# Patient Record
Sex: FEMALE | Race: WHITE | NOT HISPANIC OR LATINO | Employment: PART TIME
[De-identification: names, ages, dates, MRNs, and addresses within clinical notes are randomized per-mention and may not be internally consistent; named-entity substitution may affect disease eponyms.]

---

## 2017-03-21 ENCOUNTER — HOSPITAL (OUTPATIENT)
Dept: OTHER | Age: 42
End: 2017-03-21
Attending: EMERGENCY MEDICINE

## 2017-03-21 LAB
ANALYZER ANC (IANC): ABNORMAL
ANION GAP SERPL CALC-SCNC: 17 MMOL/L (ref 10–20)
BASOPHILS # BLD: 0 THOUSAND/MCL (ref 0–0.3)
BASOPHILS NFR BLD: 0 %
BUN SERPL-MCNC: 25 MG/DL (ref 6–20)
BUN/CREAT SERPL: 25 (ref 7–25)
CALCIUM SERPL-MCNC: 8.3 MG/DL (ref 8.4–10.2)
CHLORIDE: 107 MMOL/L (ref 98–107)
CO2 SERPL-SCNC: 21 MMOL/L (ref 21–32)
CREAT SERPL-MCNC: 0.99 MG/DL (ref 0.51–0.95)
DIFFERENTIAL METHOD BLD: ABNORMAL
EOSINOPHIL # BLD: 0.2 THOUSAND/MCL (ref 0.1–0.5)
EOSINOPHIL NFR BLD: 2 %
ERYTHROCYTE [DISTWIDTH] IN BLOOD: 13.7 % (ref 11–15)
GLUCOSE SERPL-MCNC: 130 MG/DL (ref 65–99)
HEMATOCRIT: 42.2 % (ref 36–46.5)
HGB BLD-MCNC: 13.4 GM/DL (ref 12–15.5)
LYMPHOCYTES # BLD: 1.5 THOUSAND/MCL (ref 1–4.8)
LYMPHOCYTES NFR BLD: 24 %
MCH RBC QN AUTO: 28.7 PG (ref 26–34)
MCHC RBC AUTO-ENTMCNC: 31.8 GM/DL (ref 32–36.5)
MCV RBC AUTO: 90.4 FL (ref 78–100)
MONOCYTES # BLD: 0.4 THOUSAND/MCL (ref 0.3–0.9)
MONOCYTES NFR BLD: 6 %
NEUTROPHILS # BLD: 4.1 THOUSAND/MCL (ref 1.8–7.7)
NEUTROPHILS NFR BLD: 68 %
NEUTS SEG NFR BLD: ABNORMAL %
PERCENT NRBC: ABNORMAL
PLATELET # BLD: 223 THOUSAND/MCL (ref 140–450)
POTASSIUM SERPL-SCNC: 3.7 MMOL/L (ref 3.4–5.1)
RBC # BLD: 4.67 MILLION/MCL (ref 4–5.2)
SODIUM SERPL-SCNC: 141 MMOL/L (ref 135–145)
WBC # BLD: 6.2 THOUSAND/MCL (ref 4.2–11)

## 2017-04-17 ENCOUNTER — APPOINTMENT (OUTPATIENT)
Dept: CT IMAGING | Facility: HOSPITAL | Age: 42
DRG: 101 | End: 2017-04-17
Attending: EMERGENCY MEDICINE
Payer: COMMERCIAL

## 2017-04-17 ENCOUNTER — HOSPITAL ENCOUNTER (INPATIENT)
Facility: HOSPITAL | Age: 42
LOS: 2 days | Discharge: HOME | DRG: 101 | End: 2017-04-19
Attending: EMERGENCY MEDICINE | Admitting: HOSPITALIST
Payer: COMMERCIAL

## 2017-04-17 DIAGNOSIS — R51.9 INTRACTABLE HEADACHE, UNSPECIFIED CHRONICITY PATTERN, UNSPECIFIED HEADACHE TYPE: ICD-10-CM

## 2017-04-17 DIAGNOSIS — G40.909 SEIZURE DISORDER (HCC): Primary | ICD-10-CM

## 2017-04-17 PROCEDURE — 70450 CT HEAD/BRAIN W/O DYE: CPT

## 2017-04-17 RX ORDER — HEPARIN SODIUM 5000 [USP'U]/ML
5000 INJECTION, SOLUTION INTRAVENOUS; SUBCUTANEOUS EVERY 12 HOURS
Status: DISCONTINUED | OUTPATIENT
Start: 2017-04-17 | End: 2017-04-19

## 2017-04-17 RX ORDER — LORAZEPAM 2 MG/ML
INJECTION INTRAMUSCULAR
Status: DISPENSED
Start: 2017-04-17 | End: 2017-04-18

## 2017-04-17 RX ORDER — KETOROLAC TROMETHAMINE 30 MG/ML
30 INJECTION, SOLUTION INTRAMUSCULAR; INTRAVENOUS ONCE
Status: COMPLETED | OUTPATIENT
Start: 2017-04-17 | End: 2017-04-17

## 2017-04-17 RX ORDER — LORAZEPAM 2 MG/ML
2 INJECTION INTRAMUSCULAR AS NEEDED
COMMUNITY
End: 2018-09-26 | Stop reason: ALTCHOICE

## 2017-04-17 RX ORDER — DIPHENHYDRAMINE HYDROCHLORIDE 50 MG/ML
50 INJECTION INTRAMUSCULAR; INTRAVENOUS AS NEEDED
COMMUNITY
End: 2021-09-30 | Stop reason: ALTCHOICE

## 2017-04-17 RX ORDER — MORPHINE SULFATE 15 MG/1
15 TABLET ORAL 2 TIMES DAILY PRN
COMMUNITY
End: 2018-09-26 | Stop reason: ALTCHOICE

## 2017-04-17 RX ORDER — LACTULOSE 10 G/15ML
10 SOLUTION ORAL 2 TIMES DAILY
COMMUNITY
End: 2019-11-20 | Stop reason: ALTCHOICE

## 2017-04-17 RX ORDER — SODIUM CHLORIDE 9 MG/ML
INJECTION, SOLUTION INTRAVENOUS CONTINUOUS
Status: DISCONTINUED | OUTPATIENT
Start: 2017-04-17 | End: 2017-04-19

## 2017-04-17 RX ORDER — ONDANSETRON 2 MG/ML
4 INJECTION INTRAMUSCULAR; INTRAVENOUS EVERY 4 HOURS PRN
Status: DISCONTINUED | OUTPATIENT
Start: 2017-04-17 | End: 2017-04-19

## 2017-04-17 RX ORDER — DIPHENHYDRAMINE HYDROCHLORIDE 50 MG/ML
12.5 INJECTION INTRAMUSCULAR; INTRAVENOUS ONCE
Status: COMPLETED | OUTPATIENT
Start: 2017-04-17 | End: 2017-04-17

## 2017-04-17 RX ORDER — ZOLPIDEM TARTRATE 10 MG/1
10 TABLET ORAL NIGHTLY
COMMUNITY
End: 2019-04-23

## 2017-04-17 RX ORDER — LORAZEPAM 2 MG/ML
1 INJECTION INTRAMUSCULAR ONCE
Status: DISCONTINUED | OUTPATIENT
Start: 2017-04-17 | End: 2017-04-19

## 2017-04-17 RX ORDER — METOCLOPRAMIDE HYDROCHLORIDE 5 MG/ML
10 INJECTION INTRAMUSCULAR; INTRAVENOUS ONCE
Status: COMPLETED | OUTPATIENT
Start: 2017-04-17 | End: 2017-04-17

## 2017-04-17 RX ORDER — METOCLOPRAMIDE HYDROCHLORIDE 5 MG/ML
10 INJECTION INTRAMUSCULAR; INTRAVENOUS AS NEEDED
COMMUNITY

## 2017-04-17 NOTE — ED INITIAL ASSESSMENT (HPI)
PT from PCP office for \"regular check-up\", had 2 witnessed seizures. Pt states has not slept in 2 days which bring on seizures, 1 episode seizure last night. Pt states \"I took a concoction of medication last night to help me sleep. \"

## 2017-04-18 PROCEDURE — 99222 1ST HOSP IP/OBS MODERATE 55: CPT | Performed by: HOSPITALIST

## 2017-04-18 PROCEDURE — 99254 IP/OBS CNSLTJ NEW/EST MOD 60: CPT | Performed by: OTHER

## 2017-04-18 RX ORDER — MAGNESIUM SULFATE HEPTAHYDRATE 40 MG/ML
2 INJECTION, SOLUTION INTRAVENOUS ONCE
Status: COMPLETED | OUTPATIENT
Start: 2017-04-18 | End: 2017-04-18

## 2017-04-18 RX ORDER — LAMOTRIGINE 100 MG/1
200 TABLET ORAL EVERY MORNING
Status: DISCONTINUED | OUTPATIENT
Start: 2017-04-18 | End: 2017-04-19

## 2017-04-18 RX ORDER — METHYLPREDNISOLONE SODIUM SUCCINATE 40 MG/ML
1000 INJECTION, POWDER, LYOPHILIZED, FOR SOLUTION INTRAMUSCULAR; INTRAVENOUS ONCE
Status: DISCONTINUED | OUTPATIENT
Start: 2017-04-18 | End: 2017-04-18

## 2017-04-18 RX ORDER — FLUOXETINE HYDROCHLORIDE 20 MG/1
20 CAPSULE ORAL 2 TIMES DAILY
Status: DISCONTINUED | OUTPATIENT
Start: 2017-04-18 | End: 2017-04-19

## 2017-04-18 RX ORDER — DIPHENHYDRAMINE HYDROCHLORIDE 50 MG/ML
25 INJECTION INTRAMUSCULAR; INTRAVENOUS ONCE
Status: COMPLETED | OUTPATIENT
Start: 2017-04-18 | End: 2017-04-18

## 2017-04-18 RX ORDER — ZOLPIDEM TARTRATE 10 MG/1
10 TABLET ORAL NIGHTLY PRN
Status: DISCONTINUED | OUTPATIENT
Start: 2017-04-18 | End: 2017-04-19

## 2017-04-18 RX ORDER — POTASSIUM CHLORIDE 20 MEQ/1
40 TABLET, EXTENDED RELEASE ORAL ONCE
Status: COMPLETED | OUTPATIENT
Start: 2017-04-18 | End: 2017-04-18

## 2017-04-18 RX ORDER — MORPHINE SULFATE 15 MG/1
15 TABLET ORAL 2 TIMES DAILY PRN
Status: DISCONTINUED | OUTPATIENT
Start: 2017-04-18 | End: 2017-04-19

## 2017-04-18 RX ORDER — LACTULOSE 10 G/15ML
10 SOLUTION ORAL 2 TIMES DAILY
Status: DISCONTINUED | OUTPATIENT
Start: 2017-04-18 | End: 2017-04-19

## 2017-04-18 RX ORDER — MORPHINE SULFATE 2 MG/ML
2 INJECTION, SOLUTION INTRAMUSCULAR; INTRAVENOUS ONCE
Status: COMPLETED | OUTPATIENT
Start: 2017-04-18 | End: 2017-04-18

## 2017-04-18 RX ORDER — LAMOTRIGINE 100 MG/1
300 TABLET ORAL NIGHTLY
Status: DISCONTINUED | OUTPATIENT
Start: 2017-04-18 | End: 2017-04-19

## 2017-04-18 RX ORDER — TOPIRAMATE 100 MG/1
100 TABLET, FILM COATED ORAL EVERY MORNING
Status: DISCONTINUED | OUTPATIENT
Start: 2017-04-18 | End: 2017-04-19

## 2017-04-18 NOTE — PROGRESS NOTES
ADMISSION NOTE    37year old female with h/o seizure disorder s/p partial frontal lobectomy for seizure control has been unable to sleep for the past 2-3 days presents with 3 seizures today . Available medical records partially reviewed.  Dictation to foll

## 2017-04-18 NOTE — ED NOTES
Pt safe to transport to floor per MD. Report given to Aniya Mclain [facility]

## 2017-04-18 NOTE — H&P
[facility]    PATIENT'S NAME: Henry Terrazasrow   ATTENDING PHYSICIAN: Paty Bautista MD   PATIENT ACCOUNT#:   [de-identified]    LOCATION:  [address] [place] [address] RECORD #:   [id number]       YOB: 1975  ADMISSION MILY and Topamax. She did not hit her head. She denies any tongue biting during the seizure and states she has never had any tongue biting. Review of the medical record indicates that she has also had pseudoseizures in the past as well.   She tells me that th admission, fluoxetine 20 mg twice a day, lactulose 10 g twice a day, Lamictal 200 mg every morning and 300 mg nightly, lorazepam 2 mg by mouth nightly, Topamax 100 mg daily and 200 mg nightly, Ambien 10 mg nightly, metoclopramide 5 mg IM as needed for naus Clear with easy respiratory excursions. No wheezes, rhonchi, or rales. HEART:  Regular rate and rhythm. Normal S1, S2.  ABDOMEN:  Obese, soft, nontender with normoactive bowel sounds. No guarding. No rebound.   EXTREMITIES:  No clubbing, cyanosis, merlin will place on Bactroban intranasally twice a day for 5 days. 8.   DVT prophylaxis. Subcutaneous heparin. 9.   Depression. Continue fluoxetine. The patient's current clinical status and proposed treatment plan were discussed with her.   All of her q

## 2017-04-18 NOTE — CONSULTS
Neurology Inpatient Consult Note    Ernesto Valdez : 1975   Referring Physician: Dr. Beka Nuñez  HPI:     Ernesto Valdez is a 37year old female who is being seen in neurologic evaluation.     Patient is being seen in ev daily, lamotrigine 100 mg twice daily, and gabapentin 300 mg daily.   She does not appear to have followed up with neurology since then as had been instructed, but has had a number of emergency room evaluations at various facilities for seizures and headach bilaterally  Neuro:  Mental Status: alert, speech fluent and appropriate       Cranial Nerves: pupils equal, round, and reactive to light; extraocular movements intact; facial sensation intact V1-3, face symmetric, hearing intact, no dysarthria or dysphoni

## 2017-04-18 NOTE — PLAN OF CARE
Problem: Patient/Family Goals  Goal: Patient/Family Long Term Goal  Patient’s Long Term Goal: to return home without seizure activity  Interventions:  - take all prescribed medications  -follow MD recommendations  - See additional Care Plan goals for speci Monitor swallowing and airway patency with patient fatigue and changes in neurological status  - Encourage and assist patient to increase activity and self care with guidance from PT/OT  - Encourage visually impaired, hearing impaired and aphasic patients patient/family to participate in care and decision-making at the level they choose  - Honor patient and family perspectives and choices  Outcome: Progressing  Patient updated on POC and oriented to unit.   Discussed time night medications and medications pa

## 2017-04-18 NOTE — PROGRESS NOTES
[facility]    Progress Note    Rodriguez Villa Patient Status:  Inpatient    1975 MRN [id number]   Location [facility] 5SW/SE Attending James Amor MD   Hosp Day # 1 PCP VALENTIN WOLF III       SUBJECTIVE:  Still lamoTRIgine (LAMICTAL) tab 200 mg 200 mg Oral QAM   lamoTRIgine (LAMICTAL) tab 300 mg 300 mg Oral Nightly   morphINE Sulfate IR (MSIR) tab 15 mg 15 mg Oral BID PRN   topiramate (Topamax) tab 100 mg 100 mg Oral QAM   topiramate (TOPAMAX) tab 200 mg Oral Q

## 2017-04-18 NOTE — PROGRESS NOTES
[facility] Pharmacy Note: Antimicrobial Weight Dose Adjustment for: Rocephin (ceftriaxone)    Liberty Gosselin is a 37year old female who has been prescribed Rocephin (ceftriaxone)1 gm ivpb q24h.   CrCl is estimated creatinine clearance is 75 mL/min (bas

## 2017-04-18 NOTE — PROGRESS NOTES
[facility]    Progress Note    Yong Dawn Patient Status:  Inpatient    1975 MRN [id number]   Location [facility] 5SW/SE Attending Pradip Snyder MD   Hosp Day # 1 PCP VALENTIN WOLF III     Subjective:     Const pending, await neuro consult    Results:     Lab Results  Component Value Date   WBC 5.4 04/18/2017   HGB 12.5 04/18/2017   HCT 37.7 04/18/2017    04/18/2017   CREATSERUM 1.09 04/18/2017   BUN 25* 04/18/2017    04/18/2017   K 3.8 04/18/2017

## 2017-04-18 NOTE — ED PROVIDER NOTES
Patient Seen in: [facility] 5sw/se    History   Patient presents with:  Seizure Disorder (neurologic)    Stated Complaint: seizure    HPI    42-year-old female with history of pseudoseizure and epilepsy presents for evaluation of seizure.   Patient Sulfate IR 15 MG Oral Tab,  Take 15 mg by mouth 2 (two) times daily as needed for Pain. LORazepam (ATIVAN) 2 MG Oral Tab,  Take 2 mg by mouth nightly. lamoTRIgine (LAMICTAL) 100 MG Oral Tab,  Take 200 mg by mouth every morning.    lamoTRIgine (LAMICTA Abdominal: Soft. Bowel sounds are normal. She exhibits no distension. There is no tenderness. There is no rebound and no guarding. Musculoskeletal: Normal range of motion. Neurological: She is alert and oriented to person, place, and time.  No cranial positive result does not necessarily indicate the presence of viable organisms. For confirmation, epidemiological typing and susceptibility testing, appropriate culture is needed. PLEASE REFER TO MRSA SCREENING PROTOCOL FOR TREATMENT.    PROLACTIN - Norm was no further recommendations for the emergency department, does advise holding off on loading with any antiepileptics given history of possible pseudoseizures. Patient updated regarding plan for admission.       Disposition and Plan     Clinical Impressi

## 2017-04-19 VITALS
OXYGEN SATURATION: 97 % | RESPIRATION RATE: 18 BRPM | HEART RATE: 90 BPM | BODY MASS INDEX: 34.69 KG/M2 | TEMPERATURE: 98 F | WEIGHT: 221 LBS | DIASTOLIC BLOOD PRESSURE: 66 MMHG | SYSTOLIC BLOOD PRESSURE: 115 MMHG | HEIGHT: 67 IN

## 2017-04-19 PROCEDURE — 99239 HOSP IP/OBS DSCHRG MGMT >30: CPT | Performed by: HOSPITALIST

## 2017-04-19 PROCEDURE — 95816 EEG AWAKE AND DROWSY: CPT | Performed by: OTHER

## 2017-04-19 PROCEDURE — 99232 SBSQ HOSP IP/OBS MODERATE 35: CPT | Performed by: OTHER

## 2017-04-19 RX ORDER — MORPHINE SULFATE 2 MG/ML
INJECTION, SOLUTION INTRAMUSCULAR; INTRAVENOUS
Status: COMPLETED
Start: 2017-04-19 | End: 2017-04-19

## 2017-04-19 RX ORDER — KETOROLAC TROMETHAMINE 30 MG/ML
INJECTION, SOLUTION INTRAMUSCULAR; INTRAVENOUS
Status: COMPLETED
Start: 2017-04-19 | End: 2017-04-19

## 2017-04-19 RX ORDER — KETOROLAC TROMETHAMINE 30 MG/ML
30 INJECTION, SOLUTION INTRAMUSCULAR; INTRAVENOUS 2 TIMES DAILY PRN
Status: DISCONTINUED | OUTPATIENT
Start: 2017-04-19 | End: 2017-04-19

## 2017-04-19 RX ORDER — MORPHINE SULFATE 2 MG/ML
2 INJECTION, SOLUTION INTRAMUSCULAR; INTRAVENOUS ONCE
Status: COMPLETED | OUTPATIENT
Start: 2017-04-19 | End: 2017-04-19

## 2017-04-19 NOTE — PLAN OF CARE
Problem: Patient/Family Goals  Goal: Patient/Family Long Term Goal  Patient’s Long Term Goal: to return home without seizure activity  Interventions:  - take all prescribed medications  -follow MD recommendations  - See additional Care Plan goals for speci RN of any seizure activity  - Instruct patient/family to call for assistance with activity based on assessment   Outcome: Adequate for Discharge  Rails padded. Free from seizure activity.   Goal: Achieves maximal functionality and self care  INTERVENTIONS \"migraine cocktail\" including toradol IM, benadryl IM, Reglan taken together, then morphine PO as well as prn ativan.  (Dr Lillian Porras verified with pt's neurologist.)  - Provide timely, complete, and accurate information to patient/family  - Incorporate amanda

## 2017-04-19 NOTE — DISCHARGE PLANNING
SW met w/ pt to discuss discharge planning. Pt reported that she lives at home and is independent w/ ADL's and drives. Pt reported that she is able to afford Rx and is compliant w/ Rx. Pt stated she follows up w/ her PCP when necessary.  Pt does not anticip

## 2017-04-19 NOTE — PLAN OF CARE
Problem: Patient/Family Goals  Goal: Patient/Family Long Term Goal  Patient’s Long Term Goal: to return home without seizure activity  Interventions:  - take all prescribed medications  -follow MD recommendations  - See additional Care Plan goals for speci maximal functionality and self care  INTERVENTIONS  - Monitor swallowing and airway patency with patient fatigue and changes in neurological status  - Encourage and assist patient to increase activity and self care with guidance from PT/OT  - Encourage vis decision-making at the level they choose  - Honor patient and family perspectives and choices   Outcome: Progressing  Patient updated on POC and night time medication.   Benadryl and morphine ordered one time for severe head pain

## 2017-04-19 NOTE — PROCEDURES
[facility], [address] [address]      PATIENT'S NAME: Khadar Zarate   ATTENDING PHYSICIAN: Samson Cloud MD   PATIENT ACCOUNT #: [de-identified] LOCATION: [address] [place] [address] RECORD #: [id number]

## 2017-04-19 NOTE — PROGRESS NOTES
Patient was seen and examined. C/o migraine still but better. Wants to go home today. Prescriptions were verified with Aline. Pt has recently filled all medications. Discussed discharge plans with patient at bedside.    Discussed discharge meds, fol

## 2017-04-19 NOTE — PROGRESS NOTES
Neurology Inpatient Follow-up Note      HPI:     Patient continues to complain of headache. I have spoken with her outpatient neurologist; see summary below.       Past Medical Hisotory:  Reviewed    Medications:  Reviewed    Allergies:    Avocado Benadryl, Toradol, and lorazepam when she gets acute migraine; she is currently undergoing evaluation for occipital nerve stimulator for her headaches, and is also in the process of establishing care with an epileptologist    –Patient should have close fol

## 2017-04-19 NOTE — DISCHARGE SUMMARY
[facility] - [facility]    Discharge Summary    Ernesto Valdez Patient Status:  Inpatient    1975 MRN [id number]   Location [facility] 5SW/SE Attending Larisa Condon MD   Hosp Day # 2 DIMA Mancera III     Date of Admission Valium, which she gives herself at home with IM injections.  She is also followed in pain clinic as well.  She additionally reports that insomnia is a major problem for her, but over the past 2 or 3 days, she has not slept at all.  She denies any recent UR the left temporal lobe.  No acute intracranial process, prior left temporal craniectomy, and prior left frontal and frontoparietal craniotomies with prior right frontal craniotomy as well.  The patient was transferred to remote telemetry following the [address] Take 200 mg by mouth every morning. Refills:  0       lamoTRIgine 100 MG Tabs   Last time this was given:  200 mg on 4/19/2017  8:57 AM   Commonly known as:  LAMICTAL        Take 300 mg by mouth nightly.     Refills:  0       LORazepam 2 MG/ML Soln

## 2017-04-20 NOTE — PAYOR COMM NOTE
Attending Physician: No att. providers found    Review Type: ADMISSION   Reviewer:  Lo Guzman       Date: April 20, 2017 - 3:09 PM  Payor: Kevin MORRISON POS/EMILIANO  Authorization Number: [id number]  Admit date: 4/17/2017  5:42 PM   Admitted from Emergency D complete jaw reconstruction     TONSILLECTOMY         Medications :   LORazepam 2 MG/ML Injection Solution,  Inject 2 mg into the muscle as needed. DiphenhydrAMINE HCl 50 MG/ML Injection Solution,  Inject 50 mg into the muscle as needed.    lactulose 10 G kg/m2  SpO2 97%  LMP 03/27/2017        Physical Exam   Constitutional: She is oriented to person, place, and time. She appears well-developed and well-nourished. No distress. HENT:   Head: Normocephalic and atraumatic.    Mouth/Throat: Oropharynx is clear Blood Urine Moderate (*)     Leukocyte Esterase Urine Moderate (*)     WBC Urine 22 (*)     RBC URINE 63 (*)     Bacteria Urine Few (*)     All other components within normal limits   DRUG ABUSE PANEL 10 SCREEN - Abnormal; Notable for the following:     Ur PCR-CC[[id number]]     Abnormal            Final result                 Please view results for these tests on the individual orders.    URINE CULTURE, ROUTINE   CBC W/ DIFFERENTIAL       MDM   Differential diagnosis includes seizure disorder, subtherapeutic Seizures. HISTORY OF PRESENT ILLNESS:  This is a very pleasant, 43-year-old woman with a past medical history of seizure disorder. She said that the seizures began following head trauma as a child.   She has had, she reports, a partial frontal lobectomy performed, which was positive for benzodiazepines, which the patient takes at home; opiates, she is on oral morphine at home. It was also positive for marijuana. Her urinalysis revealed 22 white cells, 63 red cells, few bacteria.   Her white count was 5.5 migraine headaches, morphine IR 15 mg twice a day p.r.n., diphenhydramine 50 mg intramuscularly as needed. ALLERGIES:  Avocado causes \"my face to blow up. \"    FAMILY HISTORY:  Patient reports that her mother  at 79 of sepsis following a total knee any significant rashes. NEUROLOGIC:  The patient was awake, alert, oriented x3. There was no focal neurologic deficit. PSYCHIATRIC:  Her mood and affect were pleasant and cooperative. BACK:  There was no costovertebral angle tenderness noted.     Ran Mckee above.      Dictated By Krystal De Leon MD  d: 04/18/2017 05:28:42  t: 04/18/2017 06:41:31  Job [id number]  ELKE/         REVIEWER COMMENTS:     OTHER:

## 2018-09-26 ENCOUNTER — HOSPITAL ENCOUNTER (OUTPATIENT)
Age: 43
Discharge: HOME | End: 2018-09-26
Attending: EMERGENCY MEDICINE
Payer: COMMERCIAL

## 2018-09-26 VITALS
TEMPERATURE: 98 F | OXYGEN SATURATION: 98 % | HEIGHT: 67 IN | DIASTOLIC BLOOD PRESSURE: 88 MMHG | HEART RATE: 89 BPM | WEIGHT: 202 LBS | SYSTOLIC BLOOD PRESSURE: 118 MMHG | BODY MASS INDEX: 31.71 KG/M2 | RESPIRATION RATE: 18 BRPM

## 2018-09-26 DIAGNOSIS — H60.501 ACUTE OTITIS EXTERNA OF RIGHT EAR, UNSPECIFIED TYPE: Primary | ICD-10-CM

## 2018-09-26 DIAGNOSIS — J06.9 VIRAL URI: ICD-10-CM

## 2018-09-26 PROCEDURE — 99214 OFFICE O/P EST MOD 30 MIN: CPT

## 2018-09-26 PROCEDURE — 87081 CULTURE SCREEN ONLY: CPT | Performed by: EMERGENCY MEDICINE

## 2018-09-26 PROCEDURE — 87430 STREP A AG IA: CPT | Performed by: EMERGENCY MEDICINE

## 2018-09-26 PROCEDURE — 99204 OFFICE O/P NEW MOD 45 MIN: CPT

## 2018-09-26 RX ORDER — CLONAZEPAM 0.5 MG/1
0.5 TABLET ORAL 2 TIMES DAILY PRN
COMMUNITY
End: 2019-04-23

## 2018-09-26 RX ORDER — CIPROFLOXACIN AND DEXAMETHASONE 3; 1 MG/ML; MG/ML
4 SUSPENSION/ DROPS AURICULAR (OTIC) 2 TIMES DAILY
Qty: 7.5 ML | Refills: 0 | Status: SHIPPED | OUTPATIENT
Start: 2018-09-26 | End: 2018-10-03

## 2018-09-26 RX ORDER — BACLOFEN 10 MG/1
10 TABLET ORAL 3 TIMES DAILY
COMMUNITY
End: 2019-04-23

## 2018-09-26 RX ORDER — OXYCODONE HYDROCHLORIDE 15 MG/1
15 TABLET ORAL EVERY 4 HOURS PRN
COMMUNITY
End: 2019-04-23

## 2018-09-26 RX ORDER — MAGNESIUM OXIDE 400 MG (241.3 MG MAGNESIUM) TABLET
400 TABLET DAILY
COMMUNITY
End: 2019-04-23

## 2018-09-26 NOTE — ED PROVIDER NOTES
Patient presents with:  Cough/URI    HPI:     Liberty Gosselin is a 44year old female who presents with chief complaint of congestion, cough, sore throat, R ear pain. Started 5 days ago with sore throat, congestion and cough.   Symptoms have per normal, atraumatic, no cyanosis or edema  Pulses: 2+ and symmetric  Skin:  No rashes, lesions or abrasions. Diagnostics:     Labs Reviewed   POCT RAPID STREP - Normal   GRP A STREP CULT, THROAT       MDM:     Otitis externa on exam.  Will rx ciprodex.

## 2018-09-26 NOTE — ED INITIAL ASSESSMENT (HPI)
Starting Saturday, c/o sore throat, productive cough, headache, right ear pain and sinus congestion. Monday had diarrhea and vomiting. Went to  yesterday and was told to use Zyrtec-D and Flonase. Denies fevers.

## 2018-10-29 ENCOUNTER — APPOINTMENT (OUTPATIENT)
Dept: OBGYN CLINIC | Facility: CLINIC | Age: 43
End: 2018-10-29
Payer: COMMERCIAL

## 2018-10-29 ENCOUNTER — OFFICE VISIT (OUTPATIENT)
Dept: OBGYN CLINIC | Facility: CLINIC | Age: 43
End: 2018-10-29
Payer: COMMERCIAL

## 2018-10-29 VITALS
DIASTOLIC BLOOD PRESSURE: 80 MMHG | TEMPERATURE: 98 F | BODY MASS INDEX: 32.02 KG/M2 | WEIGHT: 204 LBS | SYSTOLIC BLOOD PRESSURE: 122 MMHG | HEIGHT: 67 IN | RESPIRATION RATE: 12 BRPM | HEART RATE: 80 BPM

## 2018-10-29 DIAGNOSIS — Z11.51 SCREENING FOR HUMAN PAPILLOMAVIRUS: ICD-10-CM

## 2018-10-29 DIAGNOSIS — N92.1 MENORRHAGIA WITH IRREGULAR CYCLE: ICD-10-CM

## 2018-10-29 DIAGNOSIS — N94.6 DYSMENORRHEA: ICD-10-CM

## 2018-10-29 DIAGNOSIS — R10.2 PELVIC PAIN: Primary | ICD-10-CM

## 2018-10-29 DIAGNOSIS — Z30.013 ENCOUNTER FOR INITIAL PRESCRIPTION OF INJECTABLE CONTRACEPTIVE: ICD-10-CM

## 2018-10-29 PROCEDURE — 87591 N.GONORRHOEAE DNA AMP PROB: CPT | Performed by: OBSTETRICS & GYNECOLOGY

## 2018-10-29 PROCEDURE — 87491 CHLMYD TRACH DNA AMP PROBE: CPT | Performed by: OBSTETRICS & GYNECOLOGY

## 2018-10-29 PROCEDURE — 88175 CYTOPATH C/V AUTO FLUID REDO: CPT | Performed by: OBSTETRICS & GYNECOLOGY

## 2018-10-29 PROCEDURE — 87624 HPV HI-RISK TYP POOLED RSLT: CPT | Performed by: OBSTETRICS & GYNECOLOGY

## 2018-10-29 PROCEDURE — 96372 THER/PROPH/DIAG INJ SC/IM: CPT | Performed by: OBSTETRICS & GYNECOLOGY

## 2018-10-29 PROCEDURE — 99203 OFFICE O/P NEW LOW 30 MIN: CPT | Performed by: OBSTETRICS & GYNECOLOGY

## 2018-10-29 RX ORDER — METRONIDAZOLE 500 MG/1
500 TABLET ORAL 2 TIMES DAILY
Qty: 14 TABLET | Refills: 0 | Status: SHIPPED | OUTPATIENT
Start: 2018-10-29 | End: 2018-11-05

## 2018-10-29 RX ORDER — MEDROXYPROGESTERONE ACETATE 150 MG/ML
150 INJECTION, SUSPENSION INTRAMUSCULAR ONCE
Status: COMPLETED | OUTPATIENT
Start: 2018-10-29 | End: 2018-10-29

## 2018-10-29 RX ADMIN — MEDROXYPROGESTERONE ACETATE 150 MG: 150 INJECTION, SUSPENSION INTRAMUSCULAR at 16:46:00

## 2018-10-29 NOTE — PROGRESS NOTES
GYN H&P     10/29/2018  1:02 PM    CC: Patient is here to establish care and to discuss severe pelvic pain    HPI: Patient is a 44year old  here . Reports  2-3 mos  cycles  . She has been experiencing severe pelvic pain for the past 1-2 months.   I MG Oral Tab Take 400 mg by mouth daily. Disp:  Rfl:    ClonazePAM 0.5 MG Oral Tab Take 0.5 mg by mouth 2 (two) times daily as needed for Anxiety. Disp:  Rfl:    baclofen 10 MG Oral Tab Take 10 mg by mouth 3 (three) times daily.  Disp:  Rfl:    Manoj Norman Relation Age of Onset   • Other (Other) Father         HIV   • Other (Kidney) Father         Failing Kidneys   • Other (Other) Mother         MRSA     Social History    Socioeconomic History      Marital status: Legally       Spouse name: Not on f nontender, no palpable masses, no nipple discharge, no skin changes, no axillary adenopathy  ABDOMEN: Soft, non distended; non tender, no masses  GYNE/:   External Genitalia: normal, no lesions, good perineal support  Urethra: meatus normal   Bladder: we

## 2018-10-29 NOTE — PROGRESS NOTES
Pt c/o pelvic pain 2x weekly. 10/10 on pain scale which sets off her seizures. Had used depo which helped. Has a short term memory problem due to partial labectomy.

## 2018-11-05 ENCOUNTER — TELEPHONE (OUTPATIENT)
Dept: OBGYN CLINIC | Facility: CLINIC | Age: 43
End: 2018-11-05

## 2018-11-05 DIAGNOSIS — N83.209 CYST OF OVARY, UNSPECIFIED LATERALITY: Primary | ICD-10-CM

## 2018-11-05 RX ORDER — CLINDAMYCIN PHOSPHATE 20 MG/G
1 CREAM VAGINAL NIGHTLY
Qty: 40 G | Refills: 0 | Status: SHIPPED | OUTPATIENT
Start: 2018-11-05 | End: 2018-11-12

## 2018-11-05 NOTE — TELEPHONE ENCOUNTER
----- Message from Andres Hammer MD sent at 11/1/2018  9:59 PM CDT -----  Call pt. If pelvic pain is persisting f/u appt.  US  Left ovary simple cyst 2.8 cm should resolve but can repeat US in 8 weeks for reassurance

## 2018-11-05 NOTE — TELEPHONE ENCOUNTER
PC with patient. Aware of pap and ultrasound results. She states the antibiotic she was placed on-flagyl- caused her to have convulsions. She has a history of epilepsy. She and the pharmacist went through her medications.   It was determined a side effect

## 2018-11-05 NOTE — TELEPHONE ENCOUNTER
PC with patient. Aware can use Cleocin vaginal cream.States she would like to try it. Pended to doctor.

## 2018-11-13 ENCOUNTER — TELEPHONE (OUTPATIENT)
Dept: OBGYN CLINIC | Facility: CLINIC | Age: 43
End: 2018-11-13

## 2018-11-13 NOTE — TELEPHONE ENCOUNTER
Spoke to pt. She is changing her tampon every 2-3 hours since she woke up this morning. Denies pain in her abd/uterus or cramping. Did wake up with a migraine. Pt is taking her migraine medication which is helping. Pt just wanting to know what to do.   Told

## 2018-11-13 NOTE — TELEPHONE ENCOUNTER
Patient came in on 10/29 and received a shot (Depo) She called today and stated she started bleeding heavily yesterday. She was wondering if this could be from the Depo, also said it is triggering her migraines. Would like to know what she should do.

## 2018-11-25 ENCOUNTER — HOSPITAL ENCOUNTER (INPATIENT)
Facility: HOSPITAL | Age: 43
LOS: 7 days | Discharge: NURSING, REHAB OR LONG-TERM CARE | DRG: 917 | End: 2018-12-02
Attending: EMERGENCY MEDICINE | Admitting: HOSPITALIST
Payer: COMMERCIAL

## 2018-11-25 ENCOUNTER — APPOINTMENT (OUTPATIENT)
Dept: CT IMAGING | Facility: HOSPITAL | Age: 43
DRG: 917 | End: 2018-11-25
Attending: HOSPITALIST
Payer: COMMERCIAL

## 2018-11-25 ENCOUNTER — APPOINTMENT (OUTPATIENT)
Dept: CT IMAGING | Facility: HOSPITAL | Age: 43
DRG: 917 | End: 2018-11-25
Attending: Other
Payer: COMMERCIAL

## 2018-11-25 ENCOUNTER — APPOINTMENT (OUTPATIENT)
Dept: GENERAL RADIOLOGY | Facility: HOSPITAL | Age: 43
DRG: 917 | End: 2018-11-25
Attending: EMERGENCY MEDICINE
Payer: COMMERCIAL

## 2018-11-25 DIAGNOSIS — T50.914A MULTIPLE DRUG OVERDOSE, UNDETERMINED INTENT, INITIAL ENCOUNTER: Primary | ICD-10-CM

## 2018-11-25 PROBLEM — T50.911A MULTIPLE DRUG OVERDOSE: Status: ACTIVE | Noted: 2018-11-25

## 2018-11-25 PROCEDURE — 99223 1ST HOSP IP/OBS HIGH 75: CPT | Performed by: HOSPITALIST

## 2018-11-25 PROCEDURE — 71045 X-RAY EXAM CHEST 1 VIEW: CPT | Performed by: EMERGENCY MEDICINE

## 2018-11-25 PROCEDURE — 5A1945Z RESPIRATORY VENTILATION, 24-96 CONSECUTIVE HOURS: ICD-10-PCS | Performed by: HOSPITALIST

## 2018-11-25 PROCEDURE — 99291 CRITICAL CARE FIRST HOUR: CPT | Performed by: NURSE PRACTITIONER

## 2018-11-25 PROCEDURE — 70450 CT HEAD/BRAIN W/O DYE: CPT | Performed by: HOSPITALIST

## 2018-11-25 PROCEDURE — 70450 CT HEAD/BRAIN W/O DYE: CPT | Performed by: OTHER

## 2018-11-25 PROCEDURE — 0BH18EZ INSERTION OF ENDOTRACHEAL AIRWAY INTO TRACHEA, VIA NATURAL OR ARTIFICIAL OPENING ENDOSCOPIC: ICD-10-PCS | Performed by: ANESTHESIOLOGY

## 2018-11-25 RX ORDER — DEXMEDETOMIDINE HYDROCHLORIDE 4 UG/ML
INJECTION, SOLUTION INTRAVENOUS CONTINUOUS
Status: DISCONTINUED | OUTPATIENT
Start: 2018-11-25 | End: 2018-11-28

## 2018-11-25 RX ORDER — ACETAMINOPHEN 160 MG/5ML
650 SOLUTION ORAL EVERY 6 HOURS PRN
Status: DISCONTINUED | OUTPATIENT
Start: 2018-11-25 | End: 2018-11-28

## 2018-11-25 RX ORDER — PHENYLEPHRINE HCL IN 0.9% NACL 50MG/250ML
PLASTIC BAG, INJECTION (ML) INTRAVENOUS CONTINUOUS
Status: DISCONTINUED | OUTPATIENT
Start: 2018-11-25 | End: 2018-11-25

## 2018-11-25 RX ORDER — ENOXAPARIN SODIUM 100 MG/ML
40 INJECTION SUBCUTANEOUS DAILY
Status: DISCONTINUED | OUTPATIENT
Start: 2018-11-25 | End: 2018-12-02

## 2018-11-25 RX ORDER — DEXTROSE AND SODIUM CHLORIDE 5; .45 G/100ML; G/100ML
INJECTION, SOLUTION INTRAVENOUS CONTINUOUS
Status: DISCONTINUED | OUTPATIENT
Start: 2018-11-25 | End: 2018-11-25

## 2018-11-25 RX ORDER — POTASSIUM CHLORIDE 14.9 MG/ML
20 INJECTION INTRAVENOUS ONCE
Status: COMPLETED | OUTPATIENT
Start: 2018-11-25 | End: 2018-11-25

## 2018-11-25 RX ORDER — LORAZEPAM 2 MG/ML
2 INJECTION INTRAMUSCULAR ONCE
Status: COMPLETED | OUTPATIENT
Start: 2018-11-25 | End: 2018-11-25

## 2018-11-25 RX ORDER — LORAZEPAM 2 MG/ML
INJECTION INTRAMUSCULAR
Status: DISPENSED
Start: 2018-11-25 | End: 2018-11-26

## 2018-11-25 RX ORDER — POLYETHYLENE GLYCOL 3350 17 G/17G
17 POWDER, FOR SOLUTION ORAL DAILY PRN
Status: DISCONTINUED | OUTPATIENT
Start: 2018-11-25 | End: 2018-11-28

## 2018-11-25 RX ORDER — ACETAMINOPHEN 650 MG/1
650 SUPPOSITORY RECTAL EVERY 6 HOURS PRN
Status: DISCONTINUED | OUTPATIENT
Start: 2018-11-25 | End: 2018-11-28

## 2018-11-25 RX ORDER — ROCURONIUM BROMIDE 10 MG/ML
INJECTION, SOLUTION INTRAVENOUS
Status: COMPLETED | OUTPATIENT
Start: 2018-11-25 | End: 2018-11-25

## 2018-11-25 RX ORDER — ACETAMINOPHEN 325 MG/1
650 TABLET ORAL EVERY 6 HOURS PRN
Status: DISCONTINUED | OUTPATIENT
Start: 2018-11-25 | End: 2018-11-28

## 2018-11-25 RX ORDER — CHLORHEXIDINE GLUCONATE 0.12 MG/ML
15 RINSE ORAL
Status: DISCONTINUED | OUTPATIENT
Start: 2018-11-25 | End: 2018-11-28

## 2018-11-25 RX ORDER — SODIUM CHLORIDE 9 MG/ML
INJECTION, SOLUTION INTRAVENOUS CONTINUOUS
Status: DISCONTINUED | OUTPATIENT
Start: 2018-11-25 | End: 2018-11-25

## 2018-11-25 RX ORDER — SODIUM PHOSPHATE, DIBASIC AND SODIUM PHOSPHATE, MONOBASIC 7; 19 G/133ML; G/133ML
1 ENEMA RECTAL ONCE AS NEEDED
Status: DISCONTINUED | OUTPATIENT
Start: 2018-11-25 | End: 2018-11-28

## 2018-11-25 RX ORDER — SODIUM CHLORIDE 9 MG/ML
INJECTION, SOLUTION INTRAVENOUS ONCE
Status: DISCONTINUED | OUTPATIENT
Start: 2018-11-25 | End: 2018-11-25

## 2018-11-25 RX ORDER — HYDRALAZINE HYDROCHLORIDE 20 MG/ML
10 INJECTION INTRAMUSCULAR; INTRAVENOUS
Status: DISCONTINUED | OUTPATIENT
Start: 2018-11-25 | End: 2018-12-02

## 2018-11-25 RX ORDER — SODIUM CHLORIDE, SODIUM LACTATE, POTASSIUM CHLORIDE, CALCIUM CHLORIDE 600; 310; 30; 20 MG/100ML; MG/100ML; MG/100ML; MG/100ML
INJECTION, SOLUTION INTRAVENOUS CONTINUOUS
Status: DISCONTINUED | OUTPATIENT
Start: 2018-11-25 | End: 2018-11-30

## 2018-11-25 RX ORDER — BISACODYL 10 MG
10 SUPPOSITORY, RECTAL RECTAL
Status: DISCONTINUED | OUTPATIENT
Start: 2018-11-25 | End: 2018-11-28

## 2018-11-25 NOTE — CONSULTS
Pulmonary/Critical Care Consult       NAME: Nannette Guzman - ROOM: 334/650-I - MRN: [id number] - Age: 44year old - :  1975    Date of Admission: 2018  3:05 AM  Admission Diagnosis: Multiple drug overdose, undetermined intent, initial encou (PROTONIX) IV push  40 mg Intravenous QAM AC   • enoxaparin  40 mg Subcutaneous Daily   • sodium chloride   Intravenous Once     Continuous Infusing Medication:  • dexmedetomidine     • Dextrose-NaCl 100 mL/hr at 11/25/18 0539     PRN Medication:acetaminop Duration 90 ms    Q-T Interval 440 ms    QTC Calculation (Bezet) 471 ms    P Axis 61 degrees    R Axis 15 degrees    T Axis 42 degrees   RAINBOW DRAW BLUE    Collection Time: 11/25/18  3:11 AM   Result Value Ref Range    Hold Blue Auto Resulted    RAINBOW 3:11 AM   Result Value Ref Range    PTT 26.2 26.1 - 34.6 seconds   CBC W/ DIFFERENTIAL    Collection Time: 11/25/18  3:11 AM   Result Value Ref Range    WBC 8.5 4.0 - 13.0 x10(3) uL    RBC 5.02 3.80 - 5.10 x10(6)uL    HGB 15.6 12.0 - 16.0 g/dL    HCT 48.7 URINE 0-2 0 - 2 /HPF    Bacteria Urine Rare (A) None Seen    Squamous Epi.  Cells None Seen Small /LPF    Renal Tubular Epithelial Cells None Seen Small /LPF    Transitional Cells None Seen Small /LPF    Mucous Urine 1+ (A) None Seen    Yeast Urine None See Absolute 0.08 0.00 - 1.00 x10(3) uL    Neutrophil % 80.9 %    Lymphocyte % 10.5 %    Monocyte % 7.2 %    Eosinophil % 0.6 %    Basophil % 0.2 %    Immature Granulocyte % 0.6 %   ARTERIAL BLOOD GAS    Collection Time: 11/25/18  7:19 AM   Result Value Ref Ra

## 2018-11-25 NOTE — CODE DOCUMENTATION
Pt arrives via EMS. Was found unresponsive at home with an empty prescription bottle of lorazepam 2 mg tablets. Also was found with suicide note. Arrives unresponsive with I gel in place for airway and bagging per EMS.  Nasal trumpet to L nare with blood no

## 2018-11-25 NOTE — PROGRESS NOTES
[facility]  Progress Note     Trevor Rascon Patient Status:  Inpatient    1975 MRN [id number]   [facility] 4SW-A Attending Kaya Fuller MD   Hosp Day # 0 PCP Unknown Pcp     Chief Complaint: AMS    S: Patient Cyndi Wells Overdose  1. probable ingestion of multiple agents - has scripts for klonopin, ativan, oxycodone in october  2. Poison control notified   3. Vent per pulm  4. Monitor labs/telemetry  5. Getting IVF, lactic neg   6. tox screen +opiates and ETOH neg  2.  Suic

## 2018-11-25 NOTE — PLAN OF CARE
NEUROLOGICAL - ADULT    • Achieves stable or improved neurological status Not Progressing    • Achieves maximal functionality and self care Not Progressing        RESPIRATORY - ADULT    • Achieves optimal ventilation and oxygenation Not Progressing

## 2018-11-25 NOTE — PROGRESS NOTES
ICU  Critical Care APRN H & P    NAME: Brayan Fregoso - ROOM: [address] [place], [place] [address] - MRN: [id number] - Age: 44year old - :1975    History Of Present Illness:  Brayan Fregoso is a 44year old female with PMHx significant for no reported history on file. Component Value Date    WBC 8.5 11/25/2018    HGB 15.6 11/25/2018    HCT 48.7 11/25/2018    .0 11/25/2018    CREATSERUM 1.12 11/25/2018    BUN 17 11/25/2018     11/25/2018    K 5.1 11/25/2018     11/25/2018    CO2 16.0 11/25/2018    GLU

## 2018-11-25 NOTE — PLAN OF CARE
Received patient at 0700. Orally intubated and mechanically ventilated. No response to painful stimuili, no response to pain, no cough, gag. Patient does have corneal reflexes. Temp-sensing andrade in place draining cl/y adequate amounts of urine.  Rectal tyl

## 2018-11-25 NOTE — CONSULTS
Came to see pt but she is intubated. Will refer to consult service. Please consult Dr Rosendo Vallejo when pt is extubated. Thank you. Krystin Johnston M.D.   Psychiatry, [facility]  [phone]

## 2018-11-25 NOTE — H&P
[facility]  History and Physical     Gayla Ng Patient Status:  Emergency    1975 MRN [id number]   Location [address] [facility] Attending Alvina Gottlieb MD   Hosp Day # 0 PCP No primary care provider on file 24   BILT  0.1   TP  7.9       CrCl cannot be calculated (Unknown ideal weight. ). Recent Labs   Lab  11/25/18   0311   PTP  12.4   INR  0.89*       No results for input(s): TROP, CK in the last 168 hours. Imaging: Imaging data reviewed in Epic.

## 2018-11-25 NOTE — ED PROVIDER NOTES
Patient Seen in: [facility] 4sw-a    History   Patient presents with:  Poisoning/Overdose (metabolic, psychiatric)    Stated Complaint: Multiple drug overdose, undetermined intent, initial encounter    HPI    Patient is brought by ambulance after a hernia. Extremities: No peripheral edema. No evidence of trauma. Neuro: Patient unresponsive, no spontaneous movements. Making no response to verbal stimuli  Or painful stimuli.     ED Course     Labs Reviewed   COMP METABOLIC PANEL (14) - Abnormal; Not CBC W/ DIFFERENTIAL[[id number]]          Abnormal            Final result                 Please view results for these tests on the individual orders.    OPIATES CONFIRMATION, URINE   RAINBOW DRAW BLUE   RAINBOW DRAW Nitin Kong DR

## 2018-11-25 NOTE — CODE DOCUMENTATION
Spoke with Eleonora from poison control. Case #[id number]. Recommendation to recheck electrolytes after 2L bolus has finished infusing. If BUN and creatinine continue to rise, may need to consider anti-freeze toxicity.  Elevated chloride level concerning for topa

## 2018-11-25 NOTE — PROGRESS NOTES
The patient has developed hypotension. Low grade temps. Will treat as sepsis.   Pan culture, fluids and Zosyn

## 2018-11-25 NOTE — PROCEDURES
Intubation Note    Called to ED for emergent intubation for respiratory failure. 100mg of rocuronium had previously been given - pt ventilated via LMA. LMA was removed, oropharynx suctioned and direct laryngoscopy performed with Mac 3 blade.  Vocal Cord Gra

## 2018-11-26 PROCEDURE — 99291 CRITICAL CARE FIRST HOUR: CPT | Performed by: OTHER

## 2018-11-26 PROCEDURE — 99232 SBSQ HOSP IP/OBS MODERATE 35: CPT | Performed by: HOSPITALIST

## 2018-11-26 PROCEDURE — 95822 EEG COMA OR SLEEP ONLY: CPT | Performed by: OTHER

## 2018-11-26 RX ORDER — MORPHINE SULFATE 4 MG/ML
2 INJECTION, SOLUTION INTRAMUSCULAR; INTRAVENOUS ONCE
Status: COMPLETED | OUTPATIENT
Start: 2018-11-26 | End: 2018-11-26

## 2018-11-26 RX ORDER — LORAZEPAM 2 MG/ML
1 INJECTION INTRAMUSCULAR
Status: DISCONTINUED | OUTPATIENT
Start: 2018-11-26 | End: 2018-12-02

## 2018-11-26 RX ORDER — LAMOTRIGINE 100 MG/1
300 TABLET ORAL EVERY EVENING
Status: DISCONTINUED | OUTPATIENT
Start: 2018-11-26 | End: 2018-12-02

## 2018-11-26 RX ORDER — TOPIRAMATE 100 MG/1
200 TABLET, FILM COATED ORAL EVERY EVENING
Status: DISCONTINUED | OUTPATIENT
Start: 2018-11-26 | End: 2018-12-02

## 2018-11-26 RX ORDER — MAGNESIUM SULFATE HEPTAHYDRATE 40 MG/ML
2 INJECTION, SOLUTION INTRAVENOUS ONCE
Status: COMPLETED | OUTPATIENT
Start: 2018-11-26 | End: 2018-11-26

## 2018-11-26 RX ORDER — LAMOTRIGINE 100 MG/1
200 TABLET ORAL DAILY
Status: DISCONTINUED | OUTPATIENT
Start: 2018-11-26 | End: 2018-12-02

## 2018-11-26 NOTE — CONSULTS
[facility]                       Gastroenterology [address] [facility] Gastroenterology    [facility] Patient Status:  Inpatient    1975 MRN [id number]   [facility] 4SW-A Attending Richa Felder MD   [address] [facility] injection  Intravenous Code/Trauma Med   acetaminophen (TYLENOL) tab 650 mg 650 mg Oral Q6H PRN   Or      acetaminophen (TYLENOL) 160 MG/5ML oral liquid 650 mg 650 mg Oral Q6H PRN   Or      acetaminophen (TYLENOL) 650 MG rectal suppository 650 mg 650 mg Re Problem Relation Age of Onset   • Other (hiv) Father      ROS:  In addition to the pertinent positives described above:             Infectious Disease:  No chronic infections or recent fevers prior to the acute illness            Cardiovascular: No histor 11/26/2018    CO2 17.0 11/26/2018    GLU 88 11/26/2018    CA 7.2 11/26/2018    ALB 2.7 11/26/2018    ALKPHO 36 11/26/2018    BILT 0.4 11/26/2018    AST 10 11/26/2018    ALT 15 11/26/2018    MG 1.8 11/26/2018    PHOS 2.2 11/26/2018     Recent Labs   Lab  11 Gastroenterology  [phone]

## 2018-11-26 NOTE — CONSULTS
[address] [place] [address] with [facility]  11/26/2018    11:13 AM      Consult requested because of drug overdose. Patient has history of epilepsy on Lamictal and Topamax.   She actually underwent left partial temp attempt to follow but did not really do it voluntarily with me. Pupils are wide and dilated not responding. Partial doll's elicited.   Facial movements    Intermittently had rhythmic flexing of toes and earlier had twitching of thigh muscles    Lungs are

## 2018-11-26 NOTE — PROCEDURES
[address] [facility] with [facility]  2018    11:31 AM        Annie Rodríguez  1975    Date of testin2018    Ordering provider:   Richardson STARK      Reason for testing:   Mental status

## 2018-11-26 NOTE — PROGRESS NOTES
Critical Care Progress Note        NAME: Anita Gan - ROOM: 894/664- - MRN: [id number] - Age: 44year old - : 1975  Date of Admission: 2018  3:05 AM  Admission Diagnosis: Multiple drug overdose, undetermined intent, initial encounter [ hydroxide, bisacodyl, FLEET ENEMA, hydrALAzine HCl     Lungs: clear to auscultation bilaterally  Heart: S1, S2 normal, no murmur, click, rub or gallop, regular rate and rhythm  Abdomen: soft, non-tender; bowel sounds normal; no masses,  no organomegaly  Ex Drug screen positive for narcotics   -supportive care   -Sitter to bedside and AYUSH to see once extubated  4. Seizure history including past lobectomies for seizure control.   Has followed with neuro at [facility]  -[facility] neuro following here  -resume medica

## 2018-11-26 NOTE — PAYOR COMM NOTE
--------------  ADMISSION REVIEW     Payor: Jessie Marshall #:  [id number]  Authorization Number: [id number]    Admit date: 11/25/18  Admit time: 4667       Admitting Physician: Leila Saldana MD  Attending Physician:  Leila Saldana MD  Primary Care Drug Screen should be used only for medical purposes.    PROTHROMBIN TIME (PT) - Abnormal; Notable for the following components:    INR 0.89 (*)     All other components within normal limits   URINALYSIS WITH CULTURE REFLEX - Abnormal; Notable for the follo With Hypothermia. Acidosis. Hypernatremia. 1. Tox notified   2. ICU care   3. Cont IVF   2. AYDE   3. Possible UTI   1. F/u culture     4.  To obtain medical records            MEDICATIONS ADMINISTERED IN LAST 1 DAY:  Chlorhexidine Gluconate (PERIDEX) 0.12 Sara Price RN    11/25/2018 1532 New Bag 3.375 g Intravenous Yogesh Noble RN      potassium chloride 40 mEq in sodium chloride 0.9 % 250 mL IVPB     Date Action Dose Route User    11/26/2018 0855 New Bag 40 mEq Intravenous Dagmar Landry RN overdose   Seizure disorder on Topamax and Lamictal  EEG abnormal with burst suppression , could be epileptic in nature or just severe CNS depression/toxic encephalopathy     Interrogate VNS in AM depending on repeat EEG  Increase Propofol to suppress move

## 2018-11-26 NOTE — PLAN OF CARE
NEUROLOGICAL - ADULT    • Achieves stable or improved neurological status Not Progressing    • Achieves maximal functionality and self care Not Progressing        Patient/Family Goals    • Patient/Family Long Term Goal Not Progressing    • Patient/Family S shift their recommendations, which are to have a bmp q12 and follow up on abg.    NEUROLOGICAL - ADULT    • Achieves stable or improved neurological status Not Progressing    • Achieves maximal functionality and self care Not Progressing        Patient/Fami

## 2018-11-26 NOTE — CM/SW NOTE
Received call from RN to assist in identifying surrogate as no known HCPOA. Pt currently vented. Per RN, pt has local friend Richardson Greenebody ([phone]) who has been here.  RN also advises that she received call this am from pt's father Tricia Schmidt ([phone]

## 2018-11-26 NOTE — PROGRESS NOTES
[facility]  Progress Note     Aldieog Castañeda Patient Status:  Inpatient    1975 MRN [id number]   [facility] 4SW-A Attending Derik Arenas MD   Hosp Day # 1 PCP Unknown Pcp     Chief Complaint: AMS    S: Patient more respo 11/25/18   0311   PTP  12.4   INR  0.89*       Recent Labs   Lab  11/25/18   1402   CK  135            Imaging: Imaging data reviewed in Epic.     Medications:   • topiramate  150 mg Oral Daily   • topiramate  200 mg Oral QPM   • lamoTRIgine  200 mg Oral Da prior    Objective:  Constitutional: unconcsious  Cardiovascular: regular rate and rhythm, no murmurs, rubs, or gallops  Respiratory: clear to ausculation bilaterally; no labored breathing pattern  Abdomen: soft; nondistended  Neuro: has gag and corneal re

## 2018-11-27 ENCOUNTER — APPOINTMENT (OUTPATIENT)
Dept: GENERAL RADIOLOGY | Facility: HOSPITAL | Age: 43
DRG: 917 | End: 2018-11-27
Attending: INTERNAL MEDICINE
Payer: COMMERCIAL

## 2018-11-27 PROCEDURE — 99232 SBSQ HOSP IP/OBS MODERATE 35: CPT | Performed by: HOSPITALIST

## 2018-11-27 PROCEDURE — 71045 X-RAY EXAM CHEST 1 VIEW: CPT | Performed by: INTERNAL MEDICINE

## 2018-11-27 PROCEDURE — 99233 SBSQ HOSP IP/OBS HIGH 50: CPT | Performed by: OTHER

## 2018-11-27 NOTE — RESPIRATORY THERAPY NOTE
Received pt on full vent support VC+ 18/500/40%/+5, no changes made overnight, will continue to monitor

## 2018-11-27 NOTE — PROGRESS NOTES
Gastroenterology Progress Note  Patient Name: Don Councilman  Chief Complaint: bloody output from OGT  S: Pt without further bloody output from OGT. She has not had a BM and has not had melena.    O: /85   Pulse 102   Temp 99 °F (37.2 °C)   Resp 18 gastrointestinal standpoint  3. Monitor for further signs of bleeding  4. Avoid NGT to high or continuous suction    Total time spent with patient and coordinating care: 26 minutes. We will sign off at this time.  Please call back if she has further sig

## 2018-11-27 NOTE — DIETARY NOTE
[facility]    NUTRITION INITIAL ASSESSMENT    Pt does not meet malnutrition criteria.       NUTRITION DIAGNOSIS/PROBLEM:    Inadequate oral intake related to inability to consume oral diet as evidenced by intubated/sedated    NUTRITION INTERVENTION: intolerance (abdominal discomfort/distention)  6.  Alternative means of nutrition at goal to meet 100% patient nutrition prescription    MEDICATIONS:  Noted    LABS:  Noted    Pt is at moderate nutrition risk    FOLLOW-UP DATE: 11/28 shalini Cleaning

## 2018-11-27 NOTE — PROGRESS NOTES
[address] [facility] Patient Status:  Inpatient    1975 MRN [id number]   [facility] 4SW-A Attending Analy Jon MD   [facility] Day # 2 PCP Unknown Pcp     Critical Care Progress Note     Date of Admission: 2018  3: HCl (APRESOLINE) injection 10 mg, 10 mg, Intravenous, Q3H PRN  •  Enoxaparin Sodium (LOVENOX) 40 MG/0.4ML injection 40 mg, 40 mg, Subcutaneous, Daily  •  lactated ringers infusion, , Intravenous, Continuous  •  Piperacillin Sod-Tazobactam So (ZOSYN) 3.375 10*   --    --    --    ALT  17  15  15   --    --    --    BILT  0.4  0.4  0.4   --    --    --    TP  6.0*  5.8*  5.5*   --    --    --      Recent Labs   Lab  11/25/18   0311  11/25/18   0605  11/26/18   0359  11/26/18   1458  11/27/18   0441   RBC  5.0

## 2018-11-27 NOTE — PROCEDURES
[address] [facility] in [place]  in affiliation with [facility]  [address]  [address]  [phone]  Fax [phone]    Name: Bj Ruiz  7/22/1975  Date of Domo abnormalities in the aforementioned localization    Correlate clinically        Frantz Benavidez MD  Vascular & General Neurology  [facility]  Date prepared: 11/27/2018, 8:43 AM

## 2018-11-27 NOTE — PROGRESS NOTES
[address] [facility] Neu[facility] Progress Note    Mehdi Redd Patient Status:  Inpatient    1975 MRN [id number]   [facility] 4SW-A Attending Ted Burr MD   Hosp Day # 2 PCP Unknown Pcp     CC: Drug overdose/Hx of seizures 3.6 11/27/2018     11/27/2018    CO2 18.0 11/27/2018     11/27/2018    CA 8.1 11/27/2018    MG 2.4 11/27/2018    PHOS 2.1 11/27/2018    PGLU 95 11/27/2018       Diagnostics:  11/27/2018 Repeat EEG- completed and results pending    11/26/2018 E

## 2018-11-27 NOTE — PROGRESS NOTES
[facility]  Progress Note     Char Bolden Patient Status:  Inpatient    1975 MRN [id number]   [facility] 4SW-A Attending Sandro Dumont MD   Hosp Day # 2 PCP Unknown Pcp     Chief Complaint: AMS    S: Patient intubated, 0311   PTP  12.4   INR  0.89*       Recent Labs   Lab  11/25/18   1402   CK  135            Imaging: Imaging data reviewed in Epic.     Medications:   • topiramate  150 mg Oral Daily   • topiramate  200 mg Oral QPM   • lamoTRIgine  200 mg Oral Daily   • bagley bilaterally; no labored breathing pattern  Abdomen: soft; nondistended       Assessment/Plan:  I have had a face-to-face encounter with this patient and agree with solange de jesus's assessment and plan for this patient.     Roge Meyer MD  [facility]

## 2018-11-27 NOTE — PLAN OF CARE
Assumed care @1900; pt in bed; on ventilator. Very minimally responsive. Afebrile. SR on monitor; VSS. Hernandez with adequate output. OG to LIS; appears dark/bloody at times; otherwise bile. Seizure precautions. +Cough/gag reflex. Harvey soft wrist restraints.  Wi

## 2018-11-27 NOTE — PLAN OF CARE
NEUROLOGICAL - ADULT    • Achieves stable or improved neurological status Progressing    • Achieves maximal functionality and self care Progressing          RESPIRATORY - ADULT    • Achieves optimal ventilation and oxygenation Progressing          Safety R

## 2018-11-28 PROCEDURE — 99232 SBSQ HOSP IP/OBS MODERATE 35: CPT | Performed by: HOSPITALIST

## 2018-11-28 PROCEDURE — 05H633Z INSERTION OF INFUSION DEVICE INTO LEFT SUBCLAVIAN VEIN, PERCUTANEOUS APPROACH: ICD-10-PCS | Performed by: HOSPITALIST

## 2018-11-28 PROCEDURE — 99233 SBSQ HOSP IP/OBS HIGH 50: CPT | Performed by: OTHER

## 2018-11-28 RX ORDER — ACETAMINOPHEN 10 MG/ML
1000 INJECTION, SOLUTION INTRAVENOUS ONCE
Status: COMPLETED | OUTPATIENT
Start: 2018-11-28 | End: 2018-11-28

## 2018-11-28 RX ORDER — SODIUM CHLORIDE 0.9 % (FLUSH) 0.9 %
10 SYRINGE (ML) INJECTION EVERY 12 HOURS
Status: DISCONTINUED | OUTPATIENT
Start: 2018-11-28 | End: 2018-12-02

## 2018-11-28 NOTE — PLAN OF CARE
Delirium    • Minimize duration of delirium Not Progressing        NEUROLOGICAL - ADULT    • Achieves maximal functionality and self care Not Progressing          NEUROLOGICAL - ADULT    • Achieves stable or improved neurological status Progressing

## 2018-11-28 NOTE — PROGRESS NOTES
Annie Rodríguez Patient Status:  Inpatient    1975 MRN [id number]   [facility] 4SW-A Attending Khai Pizano MD   Hosp Day # 3 PCP Unknown Pcp     Critical Care Progress Note      Assessment/Plan:  1.  Acute respiratory failure du H20  MAP (cm H2O):  [8.9-11] 9    Intake/Output Summary (Last 24 hours) at 11/28/2018 0852  Last data filed at 11/28/2018 0626  Gross per 24 hour   Intake 4461 ml   Output 5820 ml   Net -1359 ml       /77   Pulse 72   Temp 98.8 °F (37.1 °C)   Resp 18 hours. Recent Labs   Lab  11/25/18   1402   CK  135       Imaging: I independently visualized all relevant chest imaging in PACS, agree with radiology interpretation except where noted.

## 2018-11-28 NOTE — PLAN OF CARE
Assumed care @1900; resting in bed. Intubated on propofol; held q4hr for neuro assessment; pt does not follow commands/does w/d to pain and open eyes to voice. Afebrile. SR on monitor; VSS. Hernandez with adequate output.  TF through Al Detal@[employer] rate; minimal re

## 2018-11-28 NOTE — CM/SW NOTE
SW received call from RN this am informing SW that pt's father will be coming in town tomorrow and had been asking to set up meeting with doctors.   Per ICU RN, pt has improved clinically and may be able to get off vent tonight- she is also following randa

## 2018-11-28 NOTE — PROGRESS NOTES
[address] [facility] with [facility]  11/28/2018    5:25 PM  This is a late entry  Patient seen and examined early in the day, met with partner at bedside and answered questions    The patient is more awake and >  8  7*  7*   CREATSERUM  1.02  0.96  0.75   < >  0.91  0.93  0.90   GFRAA  78  84  114   < >  90  88  91   GFRNAA  68  73  99   < >  78  76  79   CA  7.5*  6.8*  6.9*   < >  8.1*  8.1*  7.8*   ALB  2.9*  2.8*  2.7*   --    --    --    --    NA  148*  148

## 2018-11-28 NOTE — CM/SW NOTE
Call from [facility] with [place]. She is the covering  and is available to assist with any discharge planning needs. Her contact information is [phone].     Sebas Pepper RN, [facility]  Spectra [phone]

## 2018-11-28 NOTE — PROGRESS NOTES
[facility]  Progress Note     Gris Rodriguez Patient Status:  Inpatient    1975 MRN [id number]   [facility] 4SW-A Attending Emiliano Andrews MD   Hosp Day # 3 PCP Unknown Pcp     Chief Complaint: AMS    S: Patient intubated, m ALT  17  15  15   --    --    --    --    BILT  0.4  0.4  0.4   --    --    --    --    TP  6.0*  5.8*  5.5*   --    --    --    --     < > = values in this interval not displayed. CrCl cannot be calculated (Unknown ideal weight. ).     Recent Labs following commands.     Objective:  Constitutional: awake, occasionally tracking; not following commands  Cardiovascular: regular rate and rhythm, no murmurs, rubs, or gallops  Respiratory: clear to ausculation bilaterally; no labored breathing pattern  Ab

## 2018-11-28 NOTE — PROGRESS NOTES
[facility] onsite to evaluate patient for possible organ/tissue donation. Patient continues to be a candidate for both pathways of donation.      Please call Alex Carrillo if a conversation regarding withdraw of care is to take place, there are plans for b

## 2018-11-29 PROBLEM — T50.902A INTENTIONAL DRUG OVERDOSE (HCC): Status: ACTIVE | Noted: 2018-11-25

## 2018-11-29 PROCEDURE — 99232 SBSQ HOSP IP/OBS MODERATE 35: CPT | Performed by: HOSPITALIST

## 2018-11-29 PROCEDURE — 99233 SBSQ HOSP IP/OBS HIGH 50: CPT | Performed by: OTHER

## 2018-11-29 PROCEDURE — 90792 PSYCH DIAG EVAL W/MED SRVCS: CPT | Performed by: OTHER

## 2018-11-29 RX ORDER — ONDANSETRON 2 MG/ML
4 INJECTION INTRAMUSCULAR; INTRAVENOUS EVERY 6 HOURS PRN
Status: DISCONTINUED | OUTPATIENT
Start: 2018-11-29 | End: 2018-12-02

## 2018-11-29 RX ORDER — ACETAMINOPHEN 325 MG/1
650 TABLET ORAL EVERY 6 HOURS PRN
Status: DISCONTINUED | OUTPATIENT
Start: 2018-11-29 | End: 2018-12-01

## 2018-11-29 RX ORDER — POTASSIUM CHLORIDE 20 MEQ/1
40 TABLET, EXTENDED RELEASE ORAL EVERY 4 HOURS
Status: COMPLETED | OUTPATIENT
Start: 2018-11-29 | End: 2018-11-29

## 2018-11-29 RX ORDER — PANTOPRAZOLE SODIUM 40 MG/1
40 TABLET, DELAYED RELEASE ORAL
Status: DISCONTINUED | OUTPATIENT
Start: 2018-11-29 | End: 2018-12-02

## 2018-11-29 NOTE — PROGRESS NOTES
[facility] Neurology Progress Note    Vanessa Andrew Patient Status:  Inpatient    1975 MRN [id number]   [facility] 4SW-A Attending Cristela Young MD   Hosp Day # 4 PCP Unknown Pcp         Subjective:  Vanessa Andrew i Imaging/Diagnostic:    EEG : Impression:    Abnormal EEG due to the followin) intermittent   diffuse slowing intermixed in the background consistent with   encephalopathy    2) previously noted burst suppression is   replaced by a disorganized Byron  11/29/2018  1:11 PM  Spectra [phone]    Neurology Attending notes  11/29/2018, 3:25 PM     Above notes reviewed and patient independently seen and examined as well.     Subjective:  Melvenia Primrose is a(n) 44year old female who is being followed

## 2018-11-29 NOTE — PROGRESS NOTES
[facility]  Progress Note     Sha Owensmarge Patient Status:  Inpatient    1975 MRN [id number]   [facility] 4SW-A Attending Giana Stovall MD   Hosp Day # 4 PCP Unknown Pcp     Chief Complaint: AMS    S: Patient awake/alert, 3.4*   < >  4.1  4.0  4.0  3.5*   CL  120*  119*  119*   < >  119*  118*  114*   CO2  21.0*  21.0*  17.0*   < >  18.0*  17.0*  20.0*   ALKPHO  42  38  36*   --    --    --    --    AST  12*  13*  10*   --    --    --    --    ALT  17  15  15   --    -- lovenox  · CODE status: full    Estimated date of discharge: tbd    Case d/w RN, patient. Advance diet. Wean o2. PT eval.  AMS improved. Psych eval.       NICKY Luther  8:14 AM     Chief complaint: od    Subjective:  No acute events overnight.   P

## 2018-11-29 NOTE — PROGRESS NOTES
[facility] Note: Route Optimization for Pantoprazole (PROTONIX)    Patient is currently on Pantoprazole (PROTONIX) 40 mg IV every 12 hours.    The patient meets the criteria to convert to the oral equivalent as established by the IV to Oral conversion pro

## 2018-11-29 NOTE — DIETARY NOTE
[facility]    NUTRITION INITIAL ASSESSMENT    Pt does not meet malnutrition criteria.       NUTRITION DIAGNOSIS/PROBLEM:    Inadequate oral intake related to inability to consume oral diet as evidenced by intubated/sedated- resolved    NUTRITION INTERV grams protein/day (1.0-1.5 grams protein per kg)  Fluid: ~ 2400 mls or per MD discretion    MONITOR AND EVALUATE/NUTRITION GOALS:    1. PO intake to meet at least 75% patient nutrition prescription  2. At least 75% intake of oral supplements  3.  No signs o

## 2018-11-29 NOTE — PLAN OF CARE
Received patient at 0700. A&Ox4, though insisting father is here to visit. (He is coming later in day per RN report.) LIBRA WIN. BMx1 on bedpan, now voiding on bedpan. 1:1 direct observation maintained with sitter in room. Awaiting psych consult.  Stable fo

## 2018-11-29 NOTE — SLP NOTE
ADULT SWALLOWING EVALUATION    ASSESSMENT    ASSESSMENT/OVERALL IMPRESSION:  Orders received for a bedside swallowing evaluation as patient recently intubated on 11/25 and extubated on 11/28/18 pm. Patient contacted at the bedside.  She was awake and alert, eat and drink    SWALLOWING HISTORY  Current Diet Consistency: Regular; Thin liquids  Dysphagia History: No past history of dysphagia  Imaging Results:   CXR:  Minimal left basilar atelectasis/infiltrates   MRI on 11/25:  Encephalomalacia and gliosis in the

## 2018-11-29 NOTE — PROGRESS NOTES
Jacky Duke Patient Status:  Inpatient    1975 MRN [id number]   [facility] 4SW-A Attending Gabriella Villanueva MD   Hosp Day # 4 PCP Unknown Pcp     Critical Care Progress Note      Assessment/Plan:  1.  Acute respiratory failure du ml       /83   Pulse 80   Temp 98.8 °F (37.1 °C) (Temporal)   Resp (!) 29   Wt 207 lb 14.3 oz (94.3 kg)   SpO2 100%   Physical Exam:   General: alert, cooperative   HEENT: lips, mucosa, tongue normal. Mallampati III   Lungs: Clear to auscultation valentino relevant chest imaging in PACS, agree with radiology interpretation except where noted.

## 2018-11-29 NOTE — PLAN OF CARE
Assumed care @1900; pt resting in bed. A&Ox3; forgetful of place. Very soft spoken; very weak; follows commands. Laughs at inappropriate times. Confused at times. Afebrile. SR on monitor; VSS. Hernandez d/c'd @2200. Seizure precautions.  Suicide precautions; 1:

## 2018-11-29 NOTE — PLAN OF CARE
Delirium    • Minimize duration of delirium Progressing        NEUROLOGICAL - ADULT    • Achieves stable or improved neurological status Progressing    • Achieves maximal functionality and self care Progressing        Patient/Family Goals    • Patient/Fami

## 2018-11-30 PROCEDURE — 99232 SBSQ HOSP IP/OBS MODERATE 35: CPT | Performed by: HOSPITALIST

## 2018-11-30 PROCEDURE — 99232 SBSQ HOSP IP/OBS MODERATE 35: CPT | Performed by: OTHER

## 2018-11-30 RX ORDER — TOPIRAMATE 50 MG/1
150 TABLET, FILM COATED ORAL DAILY
Qty: 30 TABLET | Refills: 1 | Status: SHIPPED | OUTPATIENT
Start: 2018-12-01 | End: 2018-12-02

## 2018-11-30 RX ORDER — FLUOXETINE HYDROCHLORIDE 20 MG/1
60 CAPSULE ORAL DAILY
Status: DISCONTINUED | OUTPATIENT
Start: 2018-11-30 | End: 2018-12-02

## 2018-11-30 RX ORDER — AMOXICILLIN AND CLAVULANATE POTASSIUM 875; 125 MG/1; MG/1
1 TABLET, FILM COATED ORAL EVERY 12 HOURS SCHEDULED
Status: DISCONTINUED | OUTPATIENT
Start: 2018-11-30 | End: 2018-12-02

## 2018-11-30 RX ORDER — RIZATRIPTAN BENZOATE 5 MG/1
5 TABLET, ORALLY DISINTEGRATING ORAL ONCE
Status: COMPLETED | OUTPATIENT
Start: 2018-11-30 | End: 2018-11-30

## 2018-11-30 RX ORDER — LAMOTRIGINE 200 MG/1
200 TABLET ORAL DAILY
Qty: 30 TABLET | Refills: 1 | Status: SHIPPED | OUTPATIENT
Start: 2018-12-01 | End: 2018-12-02

## 2018-11-30 RX ORDER — LAMOTRIGINE 150 MG/1
300 TABLET ORAL EVERY EVENING
Qty: 30 TABLET | Refills: 1 | Status: SHIPPED | OUTPATIENT
Start: 2018-11-30 | End: 2018-12-02

## 2018-11-30 NOTE — PLAN OF CARE
NEUROLOGICAL - ADULT    • Achieves stable or improved neurological status Progressing    • Achieves maximal functionality and self care Progressing          Safety Risk - Non-Violent Restraints    • Patient will remain free from self-harm Progressing

## 2018-11-30 NOTE — PROGRESS NOTES
[facility]  Progress Note     Lupe Calles Patient Status:  Inpatient    1975 MRN [id number]   [facility] 4SW-A Attending Deleta Stager, MD   Hosp Day # 5 PCP Unknown Pcp     Chief Complaint: AMS    S: Patient has headache 38  36*   --    --    --    --    --    AST  12*  13*  10*   --    --    --    --    --    ALT  17  15  15   --    --    --    --    --    BILT  0.4  0.4  0.4   --    --    --    --    --    TP  6.0*  5.8*  5.5*   --    --    --    --    --     < > = value

## 2018-11-30 NOTE — PHYSICAL THERAPY NOTE
PHYSICAL THERAPY EVALUATION - INPATIENT     Room Number: 458/458-A  Evaluation Date: 11/30/2018  Type of Evaluation: Initial  Physician Order: PT Eval and Treat    Presenting Problem: intentional drug o/d, suicide attempt  Reason for Therapy: Shelby Quintero Restriction: None                PAIN ASSESSMENT  Ratin          COGNITION  · Overall Cognitive Status:  WFL - within functional limits    RANGE OF MOTION AND STRENGTH ASSESSMENT  Upper extremity ROM and strength are within functional limits     Lower staggering. Initially exhibiting steppage gait with bilat foot drop but as distance progressed Pt able to demo incr DF during swing phase. Required multiple cues for forward gaze and to stand within base of RW while ambulating.  Pt educated on proper hand p demonstrate supine - sit EOB @ level: modified independent     Goal #2 Patient is able to demonstrate transfers Sit to/from Stand at assistance level: supervision     Goal #3 Patient is able to ambulate 250 feet with assist device: walker - rolling at [employer]

## 2018-11-30 NOTE — PROGRESS NOTES
PSYCH CONSULT    Date of Admission: 11/25/18  Date of Consult: 11/29/18  Reason for Consultation: Suicide precuations    Impression:  Primary Psychiatric Diagnosis:  Suicide attempt by OD of Ativan/Klonipin/Oxycodone/Trazodone requiring intubation.  Trigger

## 2018-11-30 NOTE — PROGRESS NOTES
[facility] Neurology Progress Note    Gemma Mejia Patient Status:  Inpatient    1975 MRN [id number]   [facility] 4SW-A Attending Chandan Sterling MD   Hosp Day # 5 PCP Unknown Pcp     Subjective:  Gemma Mejia is a( 11/30/2018     11/30/2018    K 4.0 11/30/2018     11/30/2018    CO2 21.0 11/30/2018     11/30/2018    CA 8.6 11/30/2018       Component      Latest Ref Rng & Units 11/26/2018   TOPIRAMATE      5.0 - 20.0 ug/mL 5.6   LAMOTRIGINE      2.5

## 2018-11-30 NOTE — CONSULTS
[facility]  Report of Psychiatric Consultation    Keegan Mina Patient Status:  Inpatient    1975 MRN [id number]   [facility] 4SW-A Attending Karma Madden MD   [address] Day # 4 PCP Unknown Pcp     Date of Admission: 19 suffocating, not allowing her to go out with other friends. Alice who is a clinical therapist thinks that Ne Morejon felt abandoned and this contributing to the suicide attempt. Currently, Ne Morejon feels anxious and tired.  She has low energy/mood/motiva shoulder surgery     Family History   Problem Relation Age of Onset   • Other (hiv) Father       reports that  has never smoked. She does not have any smokeless tobacco history on file.     Allergies:    Avocado                 OTHER (SEE COMMENTS)    Comme situation  Attention and Concentration:  fair  Memory:  intact remote  Language: Intact naming and repetition  Fund of Knowledge: Able to recite name of [place] president    Insight: poor  Judgment: poor    Patient Strengths/Assets:     Suicide Risk Assessments

## 2018-11-30 NOTE — PLAN OF CARE
Resumed care of patient at 0700. A&Ox4. AVSS. Ambulating in room with contact guard assist. BRP. PT eval. 1:1 sitter maintained, direct supervision at all times. Stable to transfer to CTU3 vs. AYUSH when bed available. Patient partner, Pee Young at bedside.  Karissa

## 2018-11-30 NOTE — PROGRESS NOTES
Pulmonary Progress Note        NAME: Sha Warren - ROOM: 740/543-E - MRN: [id number] - Age: 44year old - : 1975        Last 24hrs:  No events overnight, up in bedside chair    OBJECTIVE:   18  0500 18  0600 18  0700 18 119*  118*  114*   --   113*   CO2  18.0*  17.0*  20.0*   --   21.0*   BUN  7*  7*  7*   --   12   CA  8.1*  7.8*  8.2*   --   8.6   MG   --    --   2.1   --    --    PHOS   --    --   3.7   --    --        No results for input(s): ALT, AST, ALB, AMYLASE,

## 2018-12-01 PROCEDURE — 99232 SBSQ HOSP IP/OBS MODERATE 35: CPT | Performed by: HOSPITALIST

## 2018-12-01 RX ORDER — POTASSIUM CHLORIDE 20 MEQ/1
40 TABLET, EXTENDED RELEASE ORAL EVERY 4 HOURS
Status: COMPLETED | OUTPATIENT
Start: 2018-12-01 | End: 2018-12-01

## 2018-12-01 RX ORDER — BUTALBITAL, ACETAMINOPHEN AND CAFFEINE 50; 325; 40 MG/1; MG/1; MG/1
1 TABLET ORAL EVERY 4 HOURS PRN
Status: DISCONTINUED | OUTPATIENT
Start: 2018-12-01 | End: 2018-12-02

## 2018-12-01 RX ORDER — FLUOXETINE HYDROCHLORIDE 20 MG/1
60 CAPSULE ORAL DAILY
Qty: 30 CAPSULE | Refills: 2 | Status: SHIPPED | OUTPATIENT
Start: 2018-12-02 | End: 2018-12-10

## 2018-12-01 RX ORDER — METOCLOPRAMIDE 10 MG/1
10 TABLET ORAL
Status: DISCONTINUED | OUTPATIENT
Start: 2018-12-01 | End: 2018-12-01

## 2018-12-01 RX ORDER — ACETAMINOPHEN 325 MG/1
650 TABLET ORAL EVERY 6 HOURS PRN
Status: DISCONTINUED | OUTPATIENT
Start: 2018-12-01 | End: 2018-12-02

## 2018-12-01 RX ORDER — PANTOPRAZOLE SODIUM 40 MG/1
40 TABLET, DELAYED RELEASE ORAL
Qty: 60 TABLET | Refills: 2 | Status: SHIPPED | OUTPATIENT
Start: 2018-12-01 | End: 2019-04-23

## 2018-12-01 RX ORDER — METOCLOPRAMIDE 10 MG/1
10 TABLET ORAL EVERY 6 HOURS PRN
Status: DISCONTINUED | OUTPATIENT
Start: 2018-12-01 | End: 2018-12-02

## 2018-12-01 RX ORDER — AMOXICILLIN AND CLAVULANATE POTASSIUM 875; 125 MG/1; MG/1
1 TABLET, FILM COATED ORAL EVERY 12 HOURS SCHEDULED
Qty: 10 TABLET | Refills: 0 | Status: ON HOLD | OUTPATIENT
Start: 2018-12-01 | End: 2018-12-09

## 2018-12-01 RX ORDER — RIZATRIPTAN BENZOATE 5 MG/1
5 TABLET, ORALLY DISINTEGRATING ORAL
Status: DISCONTINUED | OUTPATIENT
Start: 2018-12-01 | End: 2018-12-02

## 2018-12-01 RX ORDER — BUTALBITAL, ACETAMINOPHEN AND CAFFEINE 50; 325; 40 MG/1; MG/1; MG/1
1 TABLET ORAL EVERY 4 HOURS PRN
Qty: 20 TABLET | Refills: 0 | Status: SHIPPED | OUTPATIENT
Start: 2018-12-01 | End: 2021-09-30

## 2018-12-01 NOTE — PLAN OF CARE
Delirium    • Minimize duration of delirium Progressing        Impaired Functional Mobility    • Achieve highest/safest level of mobility/gait Progressing        NEUROLOGICAL - ADULT    • Achieves stable or improved neurological status Progressing    • Ach

## 2018-12-01 NOTE — PROGRESS NOTES
[facility]  Progress Note     Wanda Dhillon Patient Status:  Inpatient    1975 MRN [id number]   [facility] 4SW-A Attending Brian Bolden MD   Hosp Day # 6 PCP Unknown Pcp     Chief Complaint: AMS    S: Patient feeling ok. CL  120*  119*  119*   < >  114*   --   113*  112*   CO2  21.0*  21.0*  17.0*   < >  20.0*   --   21.0*  18.0*   ALKPHO  42  38  36*   --    --    --    --    --    AST  12*  13*  10*   --    --    --    --    --    ALT  17  15  15   --    --    --    --

## 2018-12-02 VITALS
SYSTOLIC BLOOD PRESSURE: 114 MMHG | DIASTOLIC BLOOD PRESSURE: 86 MMHG | RESPIRATION RATE: 14 BRPM | TEMPERATURE: 98 F | HEART RATE: 106 BPM | WEIGHT: 207.88 LBS | OXYGEN SATURATION: 97 %

## 2018-12-02 PROBLEM — F32.A DEPRESSION: Status: ACTIVE | Noted: 2018-12-02

## 2018-12-02 PROCEDURE — 99238 HOSP IP/OBS DSCHRG MGMT 30/<: CPT | Performed by: HOSPITALIST

## 2018-12-02 RX ORDER — LAMOTRIGINE 200 MG/1
200 TABLET ORAL DAILY
Qty: 30 TABLET | Refills: 1 | Status: ON HOLD | OUTPATIENT
Start: 2018-12-02 | End: 2018-12-09

## 2018-12-02 RX ORDER — KETOROLAC TROMETHAMINE 30 MG/ML
30 INJECTION, SOLUTION INTRAMUSCULAR; INTRAVENOUS EVERY 6 HOURS PRN
Status: DISCONTINUED | OUTPATIENT
Start: 2018-12-02 | End: 2018-12-02

## 2018-12-02 RX ORDER — TOPIRAMATE 50 MG/1
150 TABLET, FILM COATED ORAL DAILY
Qty: 30 TABLET | Refills: 1 | Status: SHIPPED | OUTPATIENT
Start: 2018-12-02 | End: 2019-04-23

## 2018-12-02 RX ORDER — POTASSIUM CHLORIDE 20 MEQ/1
40 TABLET, EXTENDED RELEASE ORAL EVERY 4 HOURS
Status: COMPLETED | OUTPATIENT
Start: 2018-12-02 | End: 2018-12-02

## 2018-12-02 RX ORDER — LAMOTRIGINE 150 MG/1
300 TABLET ORAL EVERY EVENING
Qty: 30 TABLET | Refills: 1 | Status: ON HOLD | OUTPATIENT
Start: 2018-12-02 | End: 2018-12-09

## 2018-12-02 NOTE — PROGRESS NOTES
NURSING DISCHARGE NOTE    Discharged Other, (see nursing note) via Ambulance. Accompanied by Support staff  Belongings Taken by patient/family.     Discharge paperwork given to EMS  IV removed  No belongings at hospital to send with patient   Tx to [facility]

## 2018-12-02 NOTE — PLAN OF CARE
PAIN - ADULT    • Verbalizes/displays adequate comfort level or patient's stated pain goal Not Progressing          Delirium    • Minimize duration of delirium Progressing        DISCHARGE PLANNING    • Discharge to home or other facility with appropriate

## 2018-12-02 NOTE — PROGRESS NOTES
[facility]  Progress Note     Britt Domingo Patient Status:  Inpatient    1975 MRN [id number]   [facility] 4SW-A Attending Jacquelyn Garcia MD   Hosp Day # 7 PCP Unknown Pcp     Chief Complaint: AMS    S: Patient with ongoing 15  15   --    --    --    --    --    --    BILT  0.4  0.4  0.4   --    --    --    --    --    --    TP  6.0*  5.8*  5.5*   --    --    --    --    --    --     < > = values in this interval not displayed.        CrCl cannot be calculated (Unknown ideal w

## 2018-12-03 NOTE — PAYOR COMM NOTE
--------------  DISCHARGE REVIEW    Payor: Hailee Space #:  [id number]  Authorization Number: [id number]    Admit date: 11/25/18  Admit time:  0448  Discharge Date: 12/2/2018  4:24 PM     Admitting Physician: Abbey Begum MD  Attending Physician:

## 2018-12-03 NOTE — DISCHARGE SUMMARY
[facility]  DISCHARGE SUMMARY     Crow Armendariz Patient Status:  Inpatient    1975 MRN [id number]   [facility] 3NE-A Attending No att. providers found   Hosp Day # 7 PCP Unknown Pcp     Date of Admission: 2018  Date o Risk  29-58 Medium Risk  0-28   Low Risk. TCM Follow-Up Recommendation:  LACE 29-58:  Moderate Risk of readmission after discharge from the hospital.    Procedures during hospitalization:   • none    Incidental or significant findings and recommendations prescriptions at the location directed by your doctor or nurse    Bring a paper prescription for each of these medications  · Amoxicillin-Pot Clavulanate 875-125 MG Tabs  · Butalbital-APAP-Caffeine -40 MG Tabs  · FLUoxetine HCl 20 MG Caps  · lamoTRIg

## 2018-12-27 ENCOUNTER — HOSPITAL ENCOUNTER (OUTPATIENT)
Dept: GENERAL RADIOLOGY | Facility: HOSPITAL | Age: 43
Discharge: HOME | End: 2018-12-27
Attending: ANESTHESIOLOGY
Payer: COMMERCIAL

## 2018-12-27 DIAGNOSIS — M54.50 LOW BACK PAIN: ICD-10-CM

## 2018-12-27 PROCEDURE — 72110 X-RAY EXAM L-2 SPINE 4/>VWS: CPT | Performed by: ANESTHESIOLOGY

## 2019-04-23 ENCOUNTER — OFFICE VISIT (OUTPATIENT)
Dept: OBGYN CLINIC | Facility: CLINIC | Age: 44
End: 2019-04-23
Payer: COMMERCIAL

## 2019-04-23 VITALS
HEIGHT: 67 IN | BODY MASS INDEX: 34.06 KG/M2 | WEIGHT: 217 LBS | HEART RATE: 99 BPM | DIASTOLIC BLOOD PRESSURE: 64 MMHG | SYSTOLIC BLOOD PRESSURE: 118 MMHG

## 2019-04-23 DIAGNOSIS — Z30.013 ENCOUNTER FOR INITIAL PRESCRIPTION OF INJECTABLE CONTRACEPTIVE: ICD-10-CM

## 2019-04-23 DIAGNOSIS — Z12.39 SCREENING FOR MALIGNANT NEOPLASM OF BREAST: ICD-10-CM

## 2019-04-23 DIAGNOSIS — Z12.4 PAPANICOLAOU SMEAR FOR CERVICAL CANCER SCREENING: ICD-10-CM

## 2019-04-23 DIAGNOSIS — Z01.419 WELL WOMAN EXAM WITH ROUTINE GYNECOLOGICAL EXAM: Primary | ICD-10-CM

## 2019-04-23 PROCEDURE — 87624 HPV HI-RISK TYP POOLED RSLT: CPT | Performed by: OBSTETRICS & GYNECOLOGY

## 2019-04-23 PROCEDURE — 88175 CYTOPATH C/V AUTO FLUID REDO: CPT | Performed by: OBSTETRICS & GYNECOLOGY

## 2019-04-23 PROCEDURE — 96372 THER/PROPH/DIAG INJ SC/IM: CPT | Performed by: OBSTETRICS & GYNECOLOGY

## 2019-04-23 PROCEDURE — 99396 PREV VISIT EST AGE 40-64: CPT | Performed by: OBSTETRICS & GYNECOLOGY

## 2019-04-23 RX ORDER — MEDROXYPROGESTERONE ACETATE 150 MG/ML
150 INJECTION, SUSPENSION INTRAMUSCULAR ONCE
Status: COMPLETED | OUTPATIENT
Start: 2019-04-23 | End: 2019-04-23

## 2019-04-23 RX ADMIN — MEDROXYPROGESTERONE ACETATE 150 MG: 150 INJECTION, SUSPENSION INTRAMUSCULAR at 15:45:00

## 2019-04-23 NOTE — PROGRESS NOTES
Santos Rollins is a 44year old female [facility] Patient's last menstrual period was 04/18/2019 (exact date). Patient presents with:  Wellness Visit  . Had the same partner for 8 years.   She cannot orgasm anymore is becoming a problem with her signifi Transportation needs:        Medical: Not on file        Non-medical: Not on file    Tobacco Use      Smoking status: Never Smoker      Smokeless tobacco: Never Used    Substance and Sexual Activity      Alcohol use: Not Currently      Drug use:  Yes Problems Maternal Grandfather    • No Known Problems Paternal Grandmother    • No Known Problems Paternal Grandfather        MEDICATIONS:    Current Outpatient Medications:   •  FLUoxetine HCl 20 MG Oral Cap, Take 3 capsules (60 mg total) by mouth daily.  ( diarrhea or constipation  Genitourinary:  denies dysuria, incontinence, abnormal vaginal discharge, vaginal itching  Skin/Breast:  Denies any breast pain, lumps, or discharge. Neurological:  denies headaches, extremity weakness or numbness.       PHYSICAL

## 2019-05-09 ENCOUNTER — HOSPITAL ENCOUNTER (EMERGENCY)
Facility: HOSPITAL | Age: 44
Discharge: HOME | End: 2019-05-09
Attending: EMERGENCY MEDICINE
Payer: COMMERCIAL

## 2019-05-09 ENCOUNTER — APPOINTMENT (OUTPATIENT)
Dept: CT IMAGING | Facility: HOSPITAL | Age: 44
End: 2019-05-09
Attending: EMERGENCY MEDICINE
Payer: COMMERCIAL

## 2019-05-09 ENCOUNTER — APPOINTMENT (OUTPATIENT)
Dept: GENERAL RADIOLOGY | Facility: HOSPITAL | Age: 44
End: 2019-05-09
Attending: EMERGENCY MEDICINE
Payer: COMMERCIAL

## 2019-05-09 VITALS
OXYGEN SATURATION: 100 % | HEART RATE: 73 BPM | DIASTOLIC BLOOD PRESSURE: 61 MMHG | HEIGHT: 67 IN | BODY MASS INDEX: 34.53 KG/M2 | TEMPERATURE: 98 F | RESPIRATION RATE: 14 BRPM | WEIGHT: 220 LBS | SYSTOLIC BLOOD PRESSURE: 103 MMHG

## 2019-05-09 DIAGNOSIS — S40.012A CONTUSION OF LEFT SHOULDER, INITIAL ENCOUNTER: ICD-10-CM

## 2019-05-09 DIAGNOSIS — G40.909 SEIZURE DISORDER (HCC): Primary | ICD-10-CM

## 2019-05-09 PROCEDURE — 81001 URINALYSIS AUTO W/SCOPE: CPT | Performed by: EMERGENCY MEDICINE

## 2019-05-09 PROCEDURE — 96375 TX/PRO/DX INJ NEW DRUG ADDON: CPT | Performed by: EMERGENCY MEDICINE

## 2019-05-09 PROCEDURE — 93010 ELECTROCARDIOGRAM REPORT: CPT | Performed by: EMERGENCY MEDICINE

## 2019-05-09 PROCEDURE — 87086 URINE CULTURE/COLONY COUNT: CPT | Performed by: EMERGENCY MEDICINE

## 2019-05-09 PROCEDURE — 70450 CT HEAD/BRAIN W/O DYE: CPT | Performed by: EMERGENCY MEDICINE

## 2019-05-09 PROCEDURE — 96374 THER/PROPH/DIAG INJ IV PUSH: CPT | Performed by: EMERGENCY MEDICINE

## 2019-05-09 PROCEDURE — 85025 COMPLETE CBC W/AUTO DIFF WBC: CPT | Performed by: EMERGENCY MEDICINE

## 2019-05-09 PROCEDURE — 71045 X-RAY EXAM CHEST 1 VIEW: CPT | Performed by: EMERGENCY MEDICINE

## 2019-05-09 PROCEDURE — 99285 EMERGENCY DEPT VISIT HI MDM: CPT | Performed by: EMERGENCY MEDICINE

## 2019-05-09 PROCEDURE — 93005 ELECTROCARDIOGRAM TRACING: CPT

## 2019-05-09 PROCEDURE — 80053 COMPREHEN METABOLIC PANEL: CPT | Performed by: EMERGENCY MEDICINE

## 2019-05-09 PROCEDURE — 73020 X-RAY EXAM OF SHOULDER: CPT | Performed by: EMERGENCY MEDICINE

## 2019-05-09 PROCEDURE — 72125 CT NECK SPINE W/O DYE: CPT | Performed by: EMERGENCY MEDICINE

## 2019-05-09 RX ORDER — LORAZEPAM 2 MG/ML
1 INJECTION INTRAMUSCULAR ONCE
Status: COMPLETED | OUTPATIENT
Start: 2019-05-09 | End: 2019-05-09

## 2019-05-09 RX ORDER — HYDROMORPHONE HYDROCHLORIDE 1 MG/ML
1 INJECTION, SOLUTION INTRAMUSCULAR; INTRAVENOUS; SUBCUTANEOUS ONCE
Status: COMPLETED | OUTPATIENT
Start: 2019-05-09 | End: 2019-05-09

## 2019-05-09 NOTE — ED NOTES
Earring were removed in CT I gave them back to the patient when we returned to the room
Necklace given back to the patient
Patient was given turkey sandwich and gatorade.
Report to [employer]
Returned from CT
X rays in process shoulder and chest
DISCHARGE

## 2019-05-09 NOTE — ED INITIAL ASSESSMENT (HPI)
Patient arrived via ambulance with a hard collar talking and answering questions.  Patient went down 2 stairs  [place] an aura she tried turning around togo to  the top of the  Stair so she would not fall and fell 20 down  carpeted stairs  She was having a sei

## 2019-05-09 NOTE — ED PROVIDER NOTES
Patient Seen in: [facility] Emergency Department    History   Patient presents with:  Trauma 1 & 2 (cardiovascular, musculoskeletal)    Stated Complaint: trauma    HPI    This is a 43-year-old female complaining of seizure.   This patient has a long Social History    Tobacco Use      Smoking status: Never Smoker      Smokeless tobacco: Never Used    Alcohol use: Not Currently    Drug use: Yes      Types:  Other-see comments, Cannabis      Comment: medical Emely Blas for pain      Review of Sy Leukocyte Esterase Urine Small (*)     All other components within normal limits   CBC WITH DIFFERENTIAL WITH PLATELET    Narrative: The following orders were created for panel order CBC WITH DIFFERENTIAL WITH PLATELET.   Procedure Prescribed:  Discharge Medication List as of 5/9/2019  4:08 PM

## 2020-01-02 ENCOUNTER — APPOINTMENT (OUTPATIENT)
Dept: CT IMAGING | Facility: HOSPITAL | Age: 45
End: 2020-01-02
Attending: EMERGENCY MEDICINE
Payer: MEDICAID

## 2020-01-02 ENCOUNTER — HOSPITAL ENCOUNTER (EMERGENCY)
Facility: HOSPITAL | Age: 45
Discharge: HOME | End: 2020-01-02
Attending: EMERGENCY MEDICINE
Payer: MEDICAID

## 2020-01-02 VITALS
WEIGHT: 230 LBS | OXYGEN SATURATION: 99 % | SYSTOLIC BLOOD PRESSURE: 116 MMHG | HEART RATE: 74 BPM | DIASTOLIC BLOOD PRESSURE: 70 MMHG | TEMPERATURE: 98 F | BODY MASS INDEX: 36.1 KG/M2 | RESPIRATION RATE: 18 BRPM | HEIGHT: 67 IN

## 2020-01-02 DIAGNOSIS — G40.909 SEIZURE DISORDER (HCC): Primary | ICD-10-CM

## 2020-01-02 LAB
ALBUMIN SERPL-MCNC: 3.6 G/DL (ref 3.4–5)
ALBUMIN/GLOB SERPL: 1 {RATIO} (ref 1–2)
ALP LIVER SERPL-CCNC: 49 U/L (ref 37–98)
ALT SERPL-CCNC: 17 U/L (ref 13–56)
AMPHET UR QL SCN: NEGATIVE
ANION GAP SERPL CALC-SCNC: 7 MMOL/L (ref 0–18)
AST SERPL-CCNC: 10 U/L (ref 15–37)
BARBITURATES UR QL SCN: NEGATIVE
BASOPHILS # BLD AUTO: 0.03 X10(3) UL (ref 0–0.2)
BASOPHILS NFR BLD AUTO: 0.4 %
BILIRUB SERPL-MCNC: 0.2 MG/DL (ref 0.1–2)
BUN BLD-MCNC: 26 MG/DL (ref 7–18)
BUN/CREAT SERPL: 21.1 (ref 10–20)
CALCIUM BLD-MCNC: 8.4 MG/DL (ref 8.5–10.1)
CHLORIDE SERPL-SCNC: 113 MMOL/L (ref 98–112)
CO2 SERPL-SCNC: 21 MMOL/L (ref 21–32)
COCAINE UR QL: NEGATIVE
CREAT BLD-MCNC: 1.23 MG/DL (ref 0.55–1.02)
CREAT UR-SCNC: 60.9 MG/DL
DEPRECATED RDW RBC AUTO: 44.9 FL (ref 35.1–46.3)
EOSINOPHIL # BLD AUTO: 0.08 X10(3) UL (ref 0–0.7)
EOSINOPHIL NFR BLD AUTO: 1.1 %
ERYTHROCYTE [DISTWIDTH] IN BLOOD BY AUTOMATED COUNT: 14.1 % (ref 11–15)
ETHANOL UR-MCNC: NEGATIVE MG/DL
GLOBULIN PLAS-MCNC: 3.6 G/DL (ref 2.8–4.4)
GLUCOSE BLD-MCNC: 91 MG/DL (ref 70–99)
HCT VFR BLD AUTO: 40.3 % (ref 35–48)
HGB BLD-MCNC: 13 G/DL (ref 12–16)
IMM GRANULOCYTES # BLD AUTO: 0.04 X10(3) UL (ref 0–1)
IMM GRANULOCYTES NFR BLD: 0.6 %
LYMPHOCYTES # BLD AUTO: 2.29 X10(3) UL (ref 1–4)
LYMPHOCYTES NFR BLD AUTO: 32.2 %
M PROTEIN MFR SERPL ELPH: 7.2 G/DL (ref 6.4–8.2)
MCH RBC QN AUTO: 28 PG (ref 26–34)
MCHC RBC AUTO-ENTMCNC: 32.3 G/DL (ref 31–37)
MCV RBC AUTO: 86.9 FL (ref 80–100)
MONOCYTES # BLD AUTO: 0.6 X10(3) UL (ref 0.1–1)
MONOCYTES NFR BLD AUTO: 8.4 %
NEUTROPHILS # BLD AUTO: 4.07 X10 (3) UL (ref 1.5–7.7)
NEUTROPHILS # BLD AUTO: 4.07 X10(3) UL (ref 1.5–7.7)
NEUTROPHILS NFR BLD AUTO: 57.3 %
OPIATES UR QL SCN: NEGATIVE
OSMOLALITY SERPL CALC.SUM OF ELEC: 296 MOSM/KG (ref 275–295)
PCP UR QL SCN: NEGATIVE
PLATELET # BLD AUTO: 241 10(3)UL (ref 150–450)
POCT LOT NUMBER: NORMAL
POCT URINE PREGNANCY: NEGATIVE
POTASSIUM SERPL-SCNC: 3.5 MMOL/L (ref 3.5–5.1)
RBC # BLD AUTO: 4.64 X10(6)UL (ref 3.8–5.3)
SODIUM SERPL-SCNC: 141 MMOL/L (ref 136–145)
WBC # BLD AUTO: 7.1 X10(3) UL (ref 4–11)

## 2020-01-02 PROCEDURE — 85025 COMPLETE CBC W/AUTO DIFF WBC: CPT | Performed by: EMERGENCY MEDICINE

## 2020-01-02 PROCEDURE — 70450 CT HEAD/BRAIN W/O DYE: CPT | Performed by: EMERGENCY MEDICINE

## 2020-01-02 PROCEDURE — 80201 ASSAY OF TOPIRAMATE: CPT | Performed by: EMERGENCY MEDICINE

## 2020-01-02 PROCEDURE — 99285 EMERGENCY DEPT VISIT HI MDM: CPT

## 2020-01-02 PROCEDURE — 80053 COMPREHEN METABOLIC PANEL: CPT | Performed by: EMERGENCY MEDICINE

## 2020-01-02 PROCEDURE — 96374 THER/PROPH/DIAG INJ IV PUSH: CPT

## 2020-01-02 PROCEDURE — 96361 HYDRATE IV INFUSION ADD-ON: CPT

## 2020-01-02 PROCEDURE — 81025 URINE PREGNANCY TEST: CPT

## 2020-01-02 PROCEDURE — 80175 DRUG SCREEN QUAN LAMOTRIGINE: CPT | Performed by: EMERGENCY MEDICINE

## 2020-01-02 PROCEDURE — 80307 DRUG TEST PRSMV CHEM ANLYZR: CPT | Performed by: EMERGENCY MEDICINE

## 2020-01-02 RX ORDER — SODIUM CHLORIDE 9 MG/ML
125 INJECTION, SOLUTION INTRAVENOUS CONTINUOUS
Status: DISCONTINUED | OUTPATIENT
Start: 2020-01-02 | End: 2020-01-02

## 2020-01-02 RX ORDER — ACETAMINOPHEN 500 MG
1000 TABLET ORAL ONCE
Status: DISCONTINUED | OUTPATIENT
Start: 2020-01-02 | End: 2020-01-02

## 2020-01-02 RX ORDER — ACETAMINOPHEN 500 MG
1000 TABLET ORAL ONCE
Status: COMPLETED | OUTPATIENT
Start: 2020-01-02 | End: 2020-01-02

## 2020-01-02 RX ORDER — KETOROLAC TROMETHAMINE 30 MG/ML
30 INJECTION, SOLUTION INTRAMUSCULAR; INTRAVENOUS ONCE
Status: COMPLETED | OUTPATIENT
Start: 2020-01-02 | End: 2020-01-02

## 2020-01-02 NOTE — ED PROVIDER NOTES
Patient Seen in: [facility] Emergency Department      History   Patient presents with:  Seizure Disorder  Fall    Stated Complaint: seizure    HPI    This is a 46-year-old female with past medical history of seizures, pseudoseizures, migraines, depr OTHER SURGICAL HISTORY      complete jaw reconstruction    • TONSILLECTOMY                      Social History    Tobacco Use      Smoking status: Never Smoker      Smokeless tobacco: Never Used    Alcohol use: Not Currently    Drug use: Yes      Types:  Ot 10 (*)     All other components within normal limits   DRUG SCREEN 7 W/OUT CONFIRMATION, URINE - Abnormal; Notable for the following components:    Benzodiazepines Urine Presumed Positive (*)     Cannabinoid Urine Presumed Positive (*)     All other compon she has been driving.       Disposition and Plan     Clinical Impression:  Seizure disorder [facility])  (primary encounter diagnosis)    Disposition:  Discharge  1/2/2020  8:04 pm    Follow-up:  dr Salomon Brooks            Medications Prescrib

## 2020-01-02 NOTE — ED INITIAL ASSESSMENT (HPI)
Arrives via [facility] with c/o seizures. Had a seizure at home, fell down two stairs. Two seizures en route, lasting approximately 30 seconds. EMS administered 5 mg of Versed en route.

## 2020-01-02 NOTE — CM/SW NOTE
Paged patient's neurologist, Dr. Tory Zimmerman ( [address] [place] [address]) at [phone]. She called back and spoke with Dr. Abilio Marinelli.

## 2020-01-04 LAB
LAMOTRIGINE: 6.2 UG/ML
TOPIRAMATE: 10.5 UG/ML

## 2020-05-01 ENCOUNTER — HOSPITAL ENCOUNTER (EMERGENCY)
Facility: HOSPITAL | Age: 45
Discharge: HOME | End: 2020-05-01
Attending: EMERGENCY MEDICINE
Payer: MEDICAID

## 2020-05-01 ENCOUNTER — APPOINTMENT (OUTPATIENT)
Dept: ULTRASOUND IMAGING | Facility: HOSPITAL | Age: 45
End: 2020-05-01
Attending: EMERGENCY MEDICINE
Payer: MEDICAID

## 2020-05-01 VITALS
HEART RATE: 71 BPM | OXYGEN SATURATION: 98 % | WEIGHT: 230 LBS | DIASTOLIC BLOOD PRESSURE: 74 MMHG | BODY MASS INDEX: 36.1 KG/M2 | SYSTOLIC BLOOD PRESSURE: 113 MMHG | HEIGHT: 67 IN | RESPIRATION RATE: 16 BRPM | TEMPERATURE: 98 F

## 2020-05-01 DIAGNOSIS — R10.2 PELVIC PAIN: Primary | ICD-10-CM

## 2020-05-01 PROCEDURE — 99285 EMERGENCY DEPT VISIT HI MDM: CPT

## 2020-05-01 PROCEDURE — 93975 VASCULAR STUDY: CPT | Performed by: EMERGENCY MEDICINE

## 2020-05-01 PROCEDURE — 81025 URINE PREGNANCY TEST: CPT

## 2020-05-01 PROCEDURE — 85025 COMPLETE CBC W/AUTO DIFF WBC: CPT | Performed by: EMERGENCY MEDICINE

## 2020-05-01 PROCEDURE — 76856 US EXAM PELVIC COMPLETE: CPT | Performed by: EMERGENCY MEDICINE

## 2020-05-01 PROCEDURE — 80048 BASIC METABOLIC PNL TOTAL CA: CPT | Performed by: EMERGENCY MEDICINE

## 2020-05-01 PROCEDURE — 87086 URINE CULTURE/COLONY COUNT: CPT | Performed by: EMERGENCY MEDICINE

## 2020-05-01 PROCEDURE — 81001 URINALYSIS AUTO W/SCOPE: CPT | Performed by: EMERGENCY MEDICINE

## 2020-05-01 PROCEDURE — 76830 TRANSVAGINAL US NON-OB: CPT | Performed by: EMERGENCY MEDICINE

## 2020-05-01 PROCEDURE — 96374 THER/PROPH/DIAG INJ IV PUSH: CPT

## 2020-05-01 RX ORDER — KETOROLAC TROMETHAMINE 15 MG/ML
15 INJECTION, SOLUTION INTRAMUSCULAR; INTRAVENOUS ONCE
Status: COMPLETED | OUTPATIENT
Start: 2020-05-01 | End: 2020-05-01

## 2020-05-01 NOTE — ED NOTES
Assumed care of patient form triage. Patient to ED form home for pelvic pain. Patient reports that she has been having pelvic pain since \"December\" but that she had an \"something put in my arm to help. \" Patient states that the pain has been better unti

## 2020-05-01 NOTE — ED NOTES
Patient still states that she does not feel ready for US. Patient reports pain is not improved, but states \"it's okay. \" Dr. Connie Calderon aware. Will continue to monitor.

## 2020-05-01 NOTE — ED NOTES
Per jacinta Narvaez to have patient drink water for pelvic ultrasound. Patient given 1 cup of water, instructed to inform this RN when she feels that she has a full bladder. Patient verbalized understanding. Will continue to monitor.

## 2020-05-01 NOTE — ED PROVIDER NOTES
Patient Seen in: [facility] AND [facility] Emergency Department      History   Patient presents with:  Abdomen/Flank Pain    Stated Complaint: pelvic pain    HPI    27-year-old female with history of seizures on Topamax and Lamictal, history of pseudoseizures, c Comment: medical Karen Martin for pain             Review of Systems    Positive for stated complaint: pelvic pain  Other systems are as noted in HPI. Constitutional and vital signs reviewed. All other systems reviewed and negative except as noted above. Urine Moderate (*)     Leukocyte Esterase Urine Small (*)     WBC Urine 7 (*)     RBC URINE 3 (*)     Bacteria Urine Few (*)     All other components within normal limits   BASIC METABOLIC PANEL (8) - Abnormal; Notable for the following components:    Gluc for intra-abdominal pathology given the chronicity and recurrent nature of her pain at this point. Advise close follow-up with her OB/GYN and she is agreeable to the plan. Return precautions given for worsening symptoms.           Disposition and Plan

## 2020-05-03 NOTE — PROGRESS NOTES
Please follow-up with pt if her pain is better? Sent to ER from office and eval was unremarkable.   If she is not improved, then she needs to see her PCP since TVUS was normal.    Thanks

## 2020-05-05 NOTE — PROGRESS NOTES
Author: Emely Nick MD Service: no documentation Author Type: Physician   Filed: 5/3/2020 11:54 AM Date of Service: 5/3/2020 11:53 AM Status: Signed   : Emely Nick MD (Physician)     Please follow-up with pt if her pain is better?   Noelle Domingo

## 2020-07-16 ENCOUNTER — HOSPITAL ENCOUNTER (EMERGENCY)
Facility: HOSPITAL | Age: 45
Discharge: HOME | End: 2020-07-16
Attending: EMERGENCY MEDICINE
Payer: MEDICAID

## 2020-07-16 VITALS
RESPIRATION RATE: 18 BRPM | SYSTOLIC BLOOD PRESSURE: 128 MMHG | DIASTOLIC BLOOD PRESSURE: 67 MMHG | BODY MASS INDEX: 36.1 KG/M2 | WEIGHT: 230 LBS | HEART RATE: 94 BPM | HEIGHT: 67 IN | OXYGEN SATURATION: 99 %

## 2020-07-16 DIAGNOSIS — R56.9 SEIZURE (HCC): ICD-10-CM

## 2020-07-16 DIAGNOSIS — G43.809 OTHER MIGRAINE WITHOUT STATUS MIGRAINOSUS, NOT INTRACTABLE: Primary | ICD-10-CM

## 2020-07-16 LAB
ALBUMIN SERPL-MCNC: 3.3 G/DL (ref 3.4–5)
ALBUMIN/GLOB SERPL: 0.9 {RATIO} (ref 1–2)
ALP LIVER SERPL-CCNC: 41 U/L (ref 37–98)
ALT SERPL-CCNC: 20 U/L (ref 13–56)
ANION GAP SERPL CALC-SCNC: 3 MMOL/L (ref 0–18)
AST SERPL-CCNC: 11 U/L (ref 15–37)
BASOPHILS # BLD AUTO: 0.02 X10(3) UL (ref 0–0.2)
BASOPHILS NFR BLD AUTO: 0.3 %
BILIRUB SERPL-MCNC: 0.2 MG/DL (ref 0.1–2)
BUN BLD-MCNC: 15 MG/DL (ref 7–18)
BUN/CREAT SERPL: 11.8 (ref 10–20)
CALCIUM BLD-MCNC: 8.1 MG/DL (ref 8.5–10.1)
CHLORIDE SERPL-SCNC: 114 MMOL/L (ref 98–112)
CO2 SERPL-SCNC: 22 MMOL/L (ref 21–32)
CREAT BLD-MCNC: 1.27 MG/DL (ref 0.55–1.02)
DEPRECATED RDW RBC AUTO: 45.5 FL (ref 35.1–46.3)
EOSINOPHIL # BLD AUTO: 0.13 X10(3) UL (ref 0–0.7)
EOSINOPHIL NFR BLD AUTO: 2 %
ERYTHROCYTE [DISTWIDTH] IN BLOOD BY AUTOMATED COUNT: 13.3 % (ref 11–15)
GLOBULIN PLAS-MCNC: 3.5 G/DL (ref 2.8–4.4)
GLUCOSE BLD-MCNC: 153 MG/DL (ref 70–99)
HCT VFR BLD AUTO: 40 % (ref 35–48)
HGB BLD-MCNC: 12.9 G/DL (ref 12–16)
IMM GRANULOCYTES # BLD AUTO: 0.04 X10(3) UL (ref 0–1)
IMM GRANULOCYTES NFR BLD: 0.6 %
LYMPHOCYTES # BLD AUTO: 1.43 X10(3) UL (ref 1–4)
LYMPHOCYTES NFR BLD AUTO: 22.2 %
M PROTEIN MFR SERPL ELPH: 6.8 G/DL (ref 6.4–8.2)
MCH RBC QN AUTO: 30.1 PG (ref 26–34)
MCHC RBC AUTO-ENTMCNC: 32.3 G/DL (ref 31–37)
MCV RBC AUTO: 93.2 FL (ref 80–100)
MONOCYTES # BLD AUTO: 0.3 X10(3) UL (ref 0.1–1)
MONOCYTES NFR BLD AUTO: 4.7 %
NEUTROPHILS # BLD AUTO: 4.51 X10 (3) UL (ref 1.5–7.7)
NEUTROPHILS # BLD AUTO: 4.51 X10(3) UL (ref 1.5–7.7)
NEUTROPHILS NFR BLD AUTO: 70.2 %
OSMOLALITY SERPL CALC.SUM OF ELEC: 292 MOSM/KG (ref 275–295)
PLATELET # BLD AUTO: 184 10(3)UL (ref 150–450)
POTASSIUM SERPL-SCNC: 3.6 MMOL/L (ref 3.5–5.1)
RBC # BLD AUTO: 4.29 X10(6)UL (ref 3.8–5.3)
SODIUM SERPL-SCNC: 139 MMOL/L (ref 136–145)
WBC # BLD AUTO: 6.4 X10(3) UL (ref 4–11)

## 2020-07-16 PROCEDURE — 96361 HYDRATE IV INFUSION ADD-ON: CPT

## 2020-07-16 PROCEDURE — 96374 THER/PROPH/DIAG INJ IV PUSH: CPT

## 2020-07-16 PROCEDURE — 85025 COMPLETE CBC W/AUTO DIFF WBC: CPT | Performed by: EMERGENCY MEDICINE

## 2020-07-16 PROCEDURE — 99284 EMERGENCY DEPT VISIT MOD MDM: CPT

## 2020-07-16 PROCEDURE — 81025 URINE PREGNANCY TEST: CPT

## 2020-07-16 PROCEDURE — 96375 TX/PRO/DX INJ NEW DRUG ADDON: CPT

## 2020-07-16 PROCEDURE — 80053 COMPREHEN METABOLIC PANEL: CPT | Performed by: EMERGENCY MEDICINE

## 2020-07-16 RX ORDER — DIPHENHYDRAMINE HYDROCHLORIDE 50 MG/ML
25 INJECTION INTRAMUSCULAR; INTRAVENOUS ONCE
Status: COMPLETED | OUTPATIENT
Start: 2020-07-16 | End: 2020-07-16

## 2020-07-16 RX ORDER — METOCLOPRAMIDE 10 MG/1
10 TABLET ORAL 3 TIMES DAILY PRN
Qty: 20 TABLET | Refills: 0 | Status: SHIPPED | OUTPATIENT
Start: 2020-07-16 | End: 2020-08-15

## 2020-07-16 RX ORDER — METOCLOPRAMIDE HYDROCHLORIDE 5 MG/ML
10 INJECTION INTRAMUSCULAR; INTRAVENOUS ONCE
Status: COMPLETED | OUTPATIENT
Start: 2020-07-16 | End: 2020-07-16

## 2020-07-16 NOTE — ED PROVIDER NOTES
Patient Seen in: [facility] Emergency Department      History   Patient presents with:  Seizure Disorder    Stated Complaint: seizures    HPI    This is a 81-year-old woman history of migraine headaches, seizure disorder here for evaluation after se reviewed and negative except as noted above.     Physical Exam     ED Triage Vitals [07/16/20 1400]   /68   Pulse 85   Resp 18   Temp    Temp src    SpO2 98 %   O2 Device None (Room air)       Current:/68   Pulse 85   Resp 18   Ht 170.2 cm (5' 7 MDM     This 52-year-old woman here for evaluation after seizure activity that she attributes to exacerbation of her migraine headaches. She did receive Reglan Benadryl IV fluids her headache is resolved, she feels much better.   She does

## 2020-07-16 NOTE — ED NOTES
Patient has occasional slurred speech, reports it is the mask and medications. Patient reports feeling well, headache is gone, does not feel like she will have another seizure. MD updated, patient remains stable to go home.   Ride is on the way

## 2020-07-16 NOTE — ED INITIAL ASSESSMENT (HPI)
Patient arrives after having a total of 8 seizures, two of which were witnessed by medics. Patient with a history of TBI, last seizure was one week ago and previously had not had one for three months. A&Ox2 on arrival, versed administered by EMS.

## 2020-08-28 ENCOUNTER — HOSPITAL ENCOUNTER (EMERGENCY)
Facility: HOSPITAL | Age: 45
Discharge: HOME | End: 2020-08-28
Attending: EMERGENCY MEDICINE
Payer: MEDICAID

## 2020-08-28 ENCOUNTER — APPOINTMENT (OUTPATIENT)
Dept: CT IMAGING | Facility: HOSPITAL | Age: 45
End: 2020-08-28
Attending: EMERGENCY MEDICINE
Payer: MEDICAID

## 2020-08-28 VITALS
RESPIRATION RATE: 19 BRPM | DIASTOLIC BLOOD PRESSURE: 57 MMHG | HEART RATE: 78 BPM | SYSTOLIC BLOOD PRESSURE: 112 MMHG | BODY MASS INDEX: 35.47 KG/M2 | WEIGHT: 226 LBS | OXYGEN SATURATION: 97 % | HEIGHT: 67 IN | TEMPERATURE: 100 F

## 2020-08-28 DIAGNOSIS — H53.2 DIPLOPIA: Primary | ICD-10-CM

## 2020-08-28 DIAGNOSIS — G43.909 MIGRAINE WITHOUT STATUS MIGRAINOSUS, NOT INTRACTABLE, UNSPECIFIED MIGRAINE TYPE: ICD-10-CM

## 2020-08-28 LAB
ALBUMIN SERPL-MCNC: 3.4 G/DL (ref 3.4–5)
ALBUMIN/GLOB SERPL: 0.9 {RATIO} (ref 1–2)
ALP LIVER SERPL-CCNC: 48 U/L (ref 37–98)
ALT SERPL-CCNC: 25 U/L (ref 13–56)
ANION GAP SERPL CALC-SCNC: 6 MMOL/L (ref 0–18)
AST SERPL-CCNC: 27 U/L (ref 15–37)
BASOPHILS # BLD AUTO: 0 X10(3) UL (ref 0–0.2)
BASOPHILS NFR BLD AUTO: 0 %
BILIRUB SERPL-MCNC: 0.4 MG/DL (ref 0.1–2)
BUN BLD-MCNC: 15 MG/DL (ref 7–18)
BUN/CREAT SERPL: 12.2 (ref 10–20)
CALCIUM BLD-MCNC: 8.2 MG/DL (ref 8.5–10.1)
CHLORIDE SERPL-SCNC: 109 MMOL/L (ref 98–112)
CO2 SERPL-SCNC: 23 MMOL/L (ref 21–32)
CREAT BLD-MCNC: 1.23 MG/DL (ref 0.55–1.02)
DEPRECATED RDW RBC AUTO: 45.1 FL (ref 35.1–46.3)
EOSINOPHIL # BLD AUTO: 0 X10(3) UL (ref 0–0.7)
EOSINOPHIL NFR BLD AUTO: 0 %
ERYTHROCYTE [DISTWIDTH] IN BLOOD BY AUTOMATED COUNT: 13.5 % (ref 11–15)
GLOBULIN PLAS-MCNC: 3.9 G/DL (ref 2.8–4.4)
GLUCOSE BLD-MCNC: 95 MG/DL (ref 70–99)
HCT VFR BLD AUTO: 40 % (ref 35–48)
HGB BLD-MCNC: 12.9 G/DL (ref 12–16)
IMM GRANULOCYTES # BLD AUTO: 0.01 X10(3) UL (ref 0–1)
IMM GRANULOCYTES NFR BLD: 0.2 %
LYMPHOCYTES # BLD AUTO: 0.56 X10(3) UL (ref 1–4)
LYMPHOCYTES NFR BLD AUTO: 12.2 %
M PROTEIN MFR SERPL ELPH: 7.3 G/DL (ref 6.4–8.2)
MCH RBC QN AUTO: 29.1 PG (ref 26–34)
MCHC RBC AUTO-ENTMCNC: 32.3 G/DL (ref 31–37)
MCV RBC AUTO: 90.1 FL (ref 80–100)
MONOCYTES # BLD AUTO: 0.19 X10(3) UL (ref 0.1–1)
MONOCYTES NFR BLD AUTO: 4.1 %
NEUTROPHILS # BLD AUTO: 3.83 X10 (3) UL (ref 1.5–7.7)
NEUTROPHILS # BLD AUTO: 3.83 X10(3) UL (ref 1.5–7.7)
NEUTROPHILS NFR BLD AUTO: 83.5 %
OSMOLALITY SERPL CALC.SUM OF ELEC: 287 MOSM/KG (ref 275–295)
PLATELET # BLD AUTO: 175 10(3)UL (ref 150–450)
POCT URINE PREGNANCY: NEGATIVE
POTASSIUM SERPL-SCNC: 3.3 MMOL/L (ref 3.5–5.1)
PROCEDURE CONTROL: YES
RBC # BLD AUTO: 4.44 X10(6)UL (ref 3.8–5.3)
SODIUM SERPL-SCNC: 138 MMOL/L (ref 136–145)
WBC # BLD AUTO: 4.6 X10(3) UL (ref 4–11)

## 2020-08-28 PROCEDURE — 80053 COMPREHEN METABOLIC PANEL: CPT | Performed by: EMERGENCY MEDICINE

## 2020-08-28 PROCEDURE — 80175 DRUG SCREEN QUAN LAMOTRIGINE: CPT | Performed by: EMERGENCY MEDICINE

## 2020-08-28 PROCEDURE — 96375 TX/PRO/DX INJ NEW DRUG ADDON: CPT

## 2020-08-28 PROCEDURE — 99285 EMERGENCY DEPT VISIT HI MDM: CPT

## 2020-08-28 PROCEDURE — 81025 URINE PREGNANCY TEST: CPT

## 2020-08-28 PROCEDURE — 93010 ELECTROCARDIOGRAM REPORT: CPT

## 2020-08-28 PROCEDURE — 70496 CT ANGIOGRAPHY HEAD: CPT | Performed by: EMERGENCY MEDICINE

## 2020-08-28 PROCEDURE — 70498 CT ANGIOGRAPHY NECK: CPT | Performed by: EMERGENCY MEDICINE

## 2020-08-28 PROCEDURE — 93005 ELECTROCARDIOGRAM TRACING: CPT

## 2020-08-28 PROCEDURE — 85025 COMPLETE CBC W/AUTO DIFF WBC: CPT | Performed by: EMERGENCY MEDICINE

## 2020-08-28 PROCEDURE — 80201 ASSAY OF TOPIRAMATE: CPT | Performed by: EMERGENCY MEDICINE

## 2020-08-28 PROCEDURE — 96361 HYDRATE IV INFUSION ADD-ON: CPT

## 2020-08-28 PROCEDURE — 96374 THER/PROPH/DIAG INJ IV PUSH: CPT

## 2020-08-28 RX ORDER — DIPHENHYDRAMINE HYDROCHLORIDE 50 MG/ML
25 INJECTION INTRAMUSCULAR; INTRAVENOUS ONCE
Status: COMPLETED | OUTPATIENT
Start: 2020-08-28 | End: 2020-08-28

## 2020-08-28 RX ORDER — KETOROLAC TROMETHAMINE 30 MG/ML
15 INJECTION, SOLUTION INTRAMUSCULAR; INTRAVENOUS ONCE
Status: COMPLETED | OUTPATIENT
Start: 2020-08-28 | End: 2020-08-28

## 2020-08-28 RX ORDER — ASPIRIN 81 MG/1
81 TABLET, CHEWABLE ORAL ONCE
Status: COMPLETED | OUTPATIENT
Start: 2020-08-28 | End: 2020-08-28

## 2020-08-28 RX ORDER — METOCLOPRAMIDE 10 MG/1
10 TABLET ORAL 3 TIMES DAILY PRN
Qty: 20 TABLET | Refills: 0 | Status: SHIPPED | OUTPATIENT
Start: 2020-08-28 | End: 2020-09-27

## 2020-08-28 RX ORDER — POTASSIUM CHLORIDE 20 MEQ/1
20 TABLET, EXTENDED RELEASE ORAL ONCE
Status: COMPLETED | OUTPATIENT
Start: 2020-08-28 | End: 2020-08-28

## 2020-08-28 RX ORDER — METOCLOPRAMIDE HYDROCHLORIDE 5 MG/ML
10 INJECTION INTRAMUSCULAR; INTRAVENOUS ONCE
Status: COMPLETED | OUTPATIENT
Start: 2020-08-28 | End: 2020-08-28

## 2020-08-28 NOTE — ED INITIAL ASSESSMENT (HPI)
Patient has double vision/headache. States stomach is upset. Had seizure on Wed, Hx epilepsy. Did not fall.  Being treated for sinus infection

## 2020-08-28 NOTE — ED PROVIDER NOTES
Patient Seen in: [facility] Emergency Department      History   Patient presents with:  Headache  Eye Visual Problem    Stated Complaint: dbl vision for five days    HPI    This is a 46-year-old male who had double vision, headache for the last betty complaint: dbl vision for five days  Other systems are as noted in HPI. Constitutional and vital signs reviewed. All other systems reviewed and negative except as noted above.     Physical Exam     ED Triage Vitals [08/28/20 1708]   /72   Pulse Value    Potassium 3.3 (*)     Creatinine 1.23 (*)     Calcium, Total 8.2 (*)     GFR, Non- 53 (*)     A/G Ratio 0.9 (*)     All other components within normal limits   CBC W/ DIFFERENTIAL - Abnormal; Notable for the following components: double vision is resolved and her extraocular muscles are grossly intact at this present time possibles include that she has some intermittent cranial nerve defect versus the other possibilities of this she could have a complex migraine I discussed this ca

## 2020-08-29 LAB
ATRIAL RATE: 96 BPM
P AXIS: 41 DEGREES
P-R INTERVAL: 142 MS
Q-T INTERVAL: 334 MS
QRS DURATION: 82 MS
QTC CALCULATION (BEZET): 421 MS
R AXIS: 7 DEGREES
T AXIS: 26 DEGREES
VENTRICULAR RATE: 96 BPM

## 2020-08-30 LAB
LAMOTRIGINE: 11.6 UG/ML
TOPIRAMATE: 9.9 UG/ML

## 2020-09-09 PROBLEM — Z82.0 FAMILY HISTORY OF MIGRAINE HEADACHES: Status: ACTIVE | Noted: 2020-09-09

## 2020-10-22 ENCOUNTER — HOSPITAL ENCOUNTER (EMERGENCY)
Facility: HOSPITAL | Age: 45
Discharge: HOME | End: 2020-10-22
Attending: EMERGENCY MEDICINE
Payer: MEDICAID

## 2020-10-22 VITALS
SYSTOLIC BLOOD PRESSURE: 102 MMHG | DIASTOLIC BLOOD PRESSURE: 66 MMHG | TEMPERATURE: 98 F | BODY MASS INDEX: 34 KG/M2 | HEART RATE: 82 BPM | OXYGEN SATURATION: 99 % | WEIGHT: 217 LBS | RESPIRATION RATE: 16 BRPM

## 2020-10-22 DIAGNOSIS — G43.109 MIGRAINE WITH AURA AND WITHOUT STATUS MIGRAINOSUS, NOT INTRACTABLE: Primary | ICD-10-CM

## 2020-10-22 DIAGNOSIS — G40.909 SEIZURE DISORDER (HCC): ICD-10-CM

## 2020-10-22 PROCEDURE — 93005 ELECTROCARDIOGRAM TRACING: CPT

## 2020-10-22 PROCEDURE — 80053 COMPREHEN METABOLIC PANEL: CPT | Performed by: EMERGENCY MEDICINE

## 2020-10-22 PROCEDURE — 96374 THER/PROPH/DIAG INJ IV PUSH: CPT

## 2020-10-22 PROCEDURE — 93010 ELECTROCARDIOGRAM REPORT: CPT

## 2020-10-22 PROCEDURE — 96361 HYDRATE IV INFUSION ADD-ON: CPT

## 2020-10-22 PROCEDURE — 99285 EMERGENCY DEPT VISIT HI MDM: CPT

## 2020-10-22 PROCEDURE — 99284 EMERGENCY DEPT VISIT MOD MDM: CPT

## 2020-10-22 PROCEDURE — 85025 COMPLETE CBC W/AUTO DIFF WBC: CPT | Performed by: EMERGENCY MEDICINE

## 2020-10-22 PROCEDURE — 96375 TX/PRO/DX INJ NEW DRUG ADDON: CPT

## 2020-10-22 RX ORDER — METOCLOPRAMIDE HYDROCHLORIDE 5 MG/ML
10 INJECTION INTRAMUSCULAR; INTRAVENOUS ONCE
Status: COMPLETED | OUTPATIENT
Start: 2020-10-22 | End: 2020-10-22

## 2020-10-22 RX ORDER — DIPHENHYDRAMINE HYDROCHLORIDE 50 MG/ML
25 INJECTION INTRAMUSCULAR; INTRAVENOUS ONCE
Status: COMPLETED | OUTPATIENT
Start: 2020-10-22 | End: 2020-10-22

## 2020-10-22 RX ORDER — KETOROLAC TROMETHAMINE 30 MG/ML
15 INJECTION, SOLUTION INTRAMUSCULAR; INTRAVENOUS ONCE
Status: COMPLETED | OUTPATIENT
Start: 2020-10-22 | End: 2020-10-22

## 2020-10-22 RX ORDER — LORAZEPAM 2 MG/ML
1 INJECTION INTRAMUSCULAR ONCE
Status: COMPLETED | OUTPATIENT
Start: 2020-10-22 | End: 2020-10-22

## 2020-10-22 NOTE — ED PROVIDER NOTES
Patient Seen in: [facility] Emergency Department      History   Patient presents with:  Seizure Disorder    Stated Complaint: seizure    HPI    43-year-old female presents to the emergency department after having a seizure.   Patient states that she Unable to get brain MRI   • OTHER SURGICAL HISTORY      cervical fusion   • OTHER SURGICAL HISTORY      shoulder replacement   • OTHER SURGICAL HISTORY      complete jaw reconstruction    • TONSILLECTOMY                      No pertinent social history. General: No focal deficit present. Mental Status: She is alert and oriented to person, place, and time. Cranial Nerves: No cranial nerve deficit.       Coordination: Coordination normal.      Deep Tendon Reflexes: Reflexes are normal and symmetric Impression:  Migraine with aura and without status migrainosus, not intractable  (primary encounter diagnosis)  Seizure disorder [facility])    Disposition:  Discharge  10/22/2020  5:27 pm    Follow-up:  Sharon Romero MD  [id number]

## 2021-01-09 ENCOUNTER — APPOINTMENT (OUTPATIENT)
Dept: CT IMAGING | Facility: HOSPITAL | Age: 46
End: 2021-01-09
Attending: EMERGENCY MEDICINE
Payer: MEDICARE

## 2021-01-09 ENCOUNTER — HOSPITAL ENCOUNTER (EMERGENCY)
Facility: HOSPITAL | Age: 46
Discharge: HOME | End: 2021-01-09
Attending: EMERGENCY MEDICINE
Payer: MEDICARE

## 2021-01-09 VITALS
WEIGHT: 212 LBS | HEART RATE: 86 BPM | SYSTOLIC BLOOD PRESSURE: 113 MMHG | RESPIRATION RATE: 24 BRPM | TEMPERATURE: 98 F | DIASTOLIC BLOOD PRESSURE: 69 MMHG | BODY MASS INDEX: 33 KG/M2 | OXYGEN SATURATION: 98 %

## 2021-01-09 DIAGNOSIS — G40.909 SEIZURE DISORDER (HCC): ICD-10-CM

## 2021-01-09 DIAGNOSIS — G43.809 OTHER MIGRAINE WITHOUT STATUS MIGRAINOSUS, NOT INTRACTABLE: Primary | ICD-10-CM

## 2021-01-09 LAB
ALBUMIN SERPL-MCNC: 3.7 G/DL (ref 3.4–5)
ALBUMIN/GLOB SERPL: 1.2 {RATIO} (ref 1–2)
ALP LIVER SERPL-CCNC: 57 U/L
ALT SERPL-CCNC: 20 U/L
ANION GAP SERPL CALC-SCNC: 8 MMOL/L (ref 0–18)
AST SERPL-CCNC: 8 U/L (ref 15–37)
BASOPHILS # BLD AUTO: 0.02 X10(3) UL (ref 0–0.2)
BASOPHILS NFR BLD AUTO: 0.4 %
BILIRUB SERPL-MCNC: 0.2 MG/DL (ref 0.1–2)
BUN BLD-MCNC: 21 MG/DL (ref 7–18)
BUN/CREAT SERPL: 16.5 (ref 10–20)
CALCIUM BLD-MCNC: 8.8 MG/DL (ref 8.5–10.1)
CHLORIDE SERPL-SCNC: 113 MMOL/L (ref 98–112)
CO2 SERPL-SCNC: 20 MMOL/L (ref 21–32)
CREAT BLD-MCNC: 1.27 MG/DL
DEPRECATED RDW RBC AUTO: 43.8 FL (ref 35.1–46.3)
EOSINOPHIL # BLD AUTO: 0.04 X10(3) UL (ref 0–0.7)
EOSINOPHIL NFR BLD AUTO: 0.7 %
ERYTHROCYTE [DISTWIDTH] IN BLOOD BY AUTOMATED COUNT: 13.3 % (ref 11–15)
GLOBULIN PLAS-MCNC: 3.1 G/DL (ref 2.8–4.4)
GLUCOSE BLD-MCNC: 102 MG/DL (ref 70–99)
HCT VFR BLD AUTO: 41.6 %
HGB BLD-MCNC: 13.6 G/DL
IMM GRANULOCYTES # BLD AUTO: 0.02 X10(3) UL (ref 0–1)
IMM GRANULOCYTES NFR BLD: 0.4 %
LYMPHOCYTES # BLD AUTO: 1.11 X10(3) UL (ref 1–4)
LYMPHOCYTES NFR BLD AUTO: 20 %
M PROTEIN MFR SERPL ELPH: 6.8 G/DL (ref 6.4–8.2)
MCH RBC QN AUTO: 29.5 PG (ref 26–34)
MCHC RBC AUTO-ENTMCNC: 32.7 G/DL (ref 31–37)
MCV RBC AUTO: 90.2 FL
MONOCYTES # BLD AUTO: 0.34 X10(3) UL (ref 0.1–1)
MONOCYTES NFR BLD AUTO: 6.1 %
NEUTROPHILS # BLD AUTO: 4.01 X10 (3) UL (ref 1.5–7.7)
NEUTROPHILS # BLD AUTO: 4.01 X10(3) UL (ref 1.5–7.7)
NEUTROPHILS NFR BLD AUTO: 72.4 %
OSMOLALITY SERPL CALC.SUM OF ELEC: 295 MOSM/KG (ref 275–295)
PLATELET # BLD AUTO: 214 10(3)UL (ref 150–450)
POTASSIUM SERPL-SCNC: 3.5 MMOL/L (ref 3.5–5.1)
RBC # BLD AUTO: 4.61 X10(6)UL
SODIUM SERPL-SCNC: 141 MMOL/L (ref 136–145)
WBC # BLD AUTO: 5.5 X10(3) UL (ref 4–11)

## 2021-01-09 PROCEDURE — 80053 COMPREHEN METABOLIC PANEL: CPT | Performed by: EMERGENCY MEDICINE

## 2021-01-09 PROCEDURE — 99284 EMERGENCY DEPT VISIT MOD MDM: CPT

## 2021-01-09 PROCEDURE — 99285 EMERGENCY DEPT VISIT HI MDM: CPT

## 2021-01-09 PROCEDURE — 96374 THER/PROPH/DIAG INJ IV PUSH: CPT

## 2021-01-09 PROCEDURE — 70450 CT HEAD/BRAIN W/O DYE: CPT | Performed by: EMERGENCY MEDICINE

## 2021-01-09 PROCEDURE — 85025 COMPLETE CBC W/AUTO DIFF WBC: CPT | Performed by: EMERGENCY MEDICINE

## 2021-01-09 PROCEDURE — 96375 TX/PRO/DX INJ NEW DRUG ADDON: CPT

## 2021-01-09 RX ORDER — DEXAMETHASONE SODIUM PHOSPHATE 10 MG/ML
10 INJECTION, SOLUTION INTRAMUSCULAR; INTRAVENOUS ONCE
Status: COMPLETED | OUTPATIENT
Start: 2021-01-09 | End: 2021-01-09

## 2021-01-09 RX ORDER — KETOROLAC TROMETHAMINE 30 MG/ML
30 INJECTION, SOLUTION INTRAMUSCULAR; INTRAVENOUS ONCE
Status: COMPLETED | OUTPATIENT
Start: 2021-01-09 | End: 2021-01-09

## 2021-01-09 RX ORDER — DIPHENHYDRAMINE HYDROCHLORIDE 50 MG/ML
25 INJECTION INTRAMUSCULAR; INTRAVENOUS ONCE
Status: COMPLETED | OUTPATIENT
Start: 2021-01-09 | End: 2021-01-09

## 2021-01-09 RX ORDER — METOCLOPRAMIDE HYDROCHLORIDE 5 MG/ML
10 INJECTION INTRAMUSCULAR; INTRAVENOUS ONCE
Status: COMPLETED | OUTPATIENT
Start: 2021-01-09 | End: 2021-01-09

## 2021-01-09 NOTE — ED INITIAL ASSESSMENT (HPI)
Patient arrives per EMS for evaluation of seizure activity. Per EMS x 2 seizures in last 30 min. Hx.  Of seizure disorder since 16 y/o starting after a head injury

## 2021-01-09 NOTE — ED PROVIDER NOTES
Patient Seen in: [facility] Emergency Department      History   Patient presents with:  Seizure Disorder    Stated Complaint: seizure    HPI/Subjective:   HPI    43-year-old female has a history of having migraines as well as frequent seizures comes above.    Physical Exam     ED Triage Vitals [01/09/21 1525]   /81   Pulse 100   Resp 20   Temp 97.9 °F (36.6 °C)   Temp src Oral   SpO2 97 %   O2 Device None (Room air)       Current:/81   Pulse 100   Temp 97.9 °F (36.6 °C) (Oral)   Resp 20 INDICATIONS:  seizure  TECHNIQUE:  Noncontrast CT scanning is performed through the brain. Dose reduction techniques were used.  Dose information is transmitted to the [facility] ([address] [place] [address] of Radiology) Marisabel Nguyen [address] ([address] [place] [address]) which include patient be discharged with outpatient management for her seizure as well as acute migraine and follow-up         MDM      As above                         Disposition and Plan     Clinical Impression:  Other migraine without status migrainosus, not intract

## 2021-02-14 ENCOUNTER — APPOINTMENT (OUTPATIENT)
Dept: CT IMAGING | Facility: HOSPITAL | Age: 46
End: 2021-02-14
Attending: EMERGENCY MEDICINE
Payer: MEDICARE

## 2021-02-14 ENCOUNTER — HOSPITAL ENCOUNTER (EMERGENCY)
Facility: HOSPITAL | Age: 46
Discharge: HOME | End: 2021-02-14
Attending: EMERGENCY MEDICINE
Payer: MEDICARE

## 2021-02-14 VITALS
SYSTOLIC BLOOD PRESSURE: 118 MMHG | RESPIRATION RATE: 14 BRPM | OXYGEN SATURATION: 96 % | HEART RATE: 79 BPM | TEMPERATURE: 99 F | BODY MASS INDEX: 33 KG/M2 | DIASTOLIC BLOOD PRESSURE: 84 MMHG | WEIGHT: 213 LBS

## 2021-02-14 DIAGNOSIS — G40.909 SEIZURE DISORDER (HCC): Primary | ICD-10-CM

## 2021-02-14 LAB
ALBUMIN SERPL-MCNC: 3.6 G/DL (ref 3.4–5)
ALBUMIN/GLOB SERPL: 1 {RATIO} (ref 1–2)
ALP LIVER SERPL-CCNC: 62 U/L
ALT SERPL-CCNC: 24 U/L
ANION GAP SERPL CALC-SCNC: 9 MMOL/L (ref 0–18)
AST SERPL-CCNC: 13 U/L (ref 15–37)
BASOPHILS # BLD AUTO: 0.02 X10(3) UL (ref 0–0.2)
BASOPHILS NFR BLD AUTO: 0.4 %
BILIRUB SERPL-MCNC: 0.2 MG/DL (ref 0.1–2)
BUN BLD-MCNC: 14 MG/DL (ref 7–18)
BUN/CREAT SERPL: 13.6 (ref 10–20)
CALCIUM BLD-MCNC: 8 MG/DL (ref 8.5–10.1)
CHLORIDE SERPL-SCNC: 115 MMOL/L (ref 98–112)
CO2 SERPL-SCNC: 18 MMOL/L (ref 21–32)
CREAT BLD-MCNC: 1.03 MG/DL
DEPRECATED RDW RBC AUTO: 48.4 FL (ref 35.1–46.3)
EOSINOPHIL # BLD AUTO: 0.16 X10(3) UL (ref 0–0.7)
EOSINOPHIL NFR BLD AUTO: 3.2 %
ERYTHROCYTE [DISTWIDTH] IN BLOOD BY AUTOMATED COUNT: 14.3 % (ref 11–15)
GLOBULIN PLAS-MCNC: 3.5 G/DL (ref 2.8–4.4)
GLUCOSE BLD-MCNC: 112 MG/DL (ref 70–99)
HCT VFR BLD AUTO: 41.8 %
HGB BLD-MCNC: 13.6 G/DL
IMM GRANULOCYTES # BLD AUTO: 0.02 X10(3) UL (ref 0–1)
IMM GRANULOCYTES NFR BLD: 0.4 %
LYMPHOCYTES # BLD AUTO: 1.16 X10(3) UL (ref 1–4)
LYMPHOCYTES NFR BLD AUTO: 22.9 %
M PROTEIN MFR SERPL ELPH: 7.1 G/DL (ref 6.4–8.2)
MCH RBC QN AUTO: 30 PG (ref 26–34)
MCHC RBC AUTO-ENTMCNC: 32.5 G/DL (ref 31–37)
MCV RBC AUTO: 92.1 FL
MONOCYTES # BLD AUTO: 0.28 X10(3) UL (ref 0.1–1)
MONOCYTES NFR BLD AUTO: 5.5 %
NEUTROPHILS # BLD AUTO: 3.43 X10 (3) UL (ref 1.5–7.7)
NEUTROPHILS # BLD AUTO: 3.43 X10(3) UL (ref 1.5–7.7)
NEUTROPHILS NFR BLD AUTO: 67.6 %
OSMOLALITY SERPL CALC.SUM OF ELEC: 295 MOSM/KG (ref 275–295)
PLATELET # BLD AUTO: 211 10(3)UL (ref 150–450)
POCT LOT NUMBER: NORMAL
POCT URINE PREGNANCY: NEGATIVE
POTASSIUM SERPL-SCNC: 3.9 MMOL/L (ref 3.5–5.1)
RBC # BLD AUTO: 4.54 X10(6)UL
SODIUM SERPL-SCNC: 142 MMOL/L (ref 136–145)
WBC # BLD AUTO: 5.1 X10(3) UL (ref 4–11)

## 2021-02-14 PROCEDURE — 85025 COMPLETE CBC W/AUTO DIFF WBC: CPT | Performed by: EMERGENCY MEDICINE

## 2021-02-14 PROCEDURE — 80053 COMPREHEN METABOLIC PANEL: CPT | Performed by: EMERGENCY MEDICINE

## 2021-02-14 PROCEDURE — 81025 URINE PREGNANCY TEST: CPT

## 2021-02-14 PROCEDURE — 93010 ELECTROCARDIOGRAM REPORT: CPT

## 2021-02-14 PROCEDURE — 70450 CT HEAD/BRAIN W/O DYE: CPT | Performed by: EMERGENCY MEDICINE

## 2021-02-14 PROCEDURE — 96374 THER/PROPH/DIAG INJ IV PUSH: CPT

## 2021-02-14 PROCEDURE — 96375 TX/PRO/DX INJ NEW DRUG ADDON: CPT

## 2021-02-14 PROCEDURE — 99285 EMERGENCY DEPT VISIT HI MDM: CPT

## 2021-02-14 PROCEDURE — 93005 ELECTROCARDIOGRAM TRACING: CPT

## 2021-02-14 PROCEDURE — 96361 HYDRATE IV INFUSION ADD-ON: CPT

## 2021-02-14 RX ORDER — DIPHENHYDRAMINE HYDROCHLORIDE 50 MG/ML
25 INJECTION INTRAMUSCULAR; INTRAVENOUS ONCE
Status: COMPLETED | OUTPATIENT
Start: 2021-02-14 | End: 2021-02-14

## 2021-02-14 RX ORDER — METOCLOPRAMIDE HYDROCHLORIDE 5 MG/ML
10 INJECTION INTRAMUSCULAR; INTRAVENOUS ONCE
Status: COMPLETED | OUTPATIENT
Start: 2021-02-14 | End: 2021-02-14

## 2021-02-14 NOTE — ED INITIAL ASSESSMENT (HPI)
Reports while at work pt had seizure, full body tonic clonic lasting approx 30 seconds per medics. Given Versed and pt stopped. Pt alert at present, reports feeling drowsy.

## 2021-02-15 LAB
ATRIAL RATE: 92 BPM
P AXIS: 34 DEGREES
P-R INTERVAL: 158 MS
Q-T INTERVAL: 366 MS
QRS DURATION: 100 MS
QTC CALCULATION (BEZET): 452 MS
R AXIS: -21 DEGREES
T AXIS: 30 DEGREES
VENTRICULAR RATE: 92 BPM

## 2021-03-14 ENCOUNTER — HOSPITAL ENCOUNTER (EMERGENCY)
Facility: HOSPITAL | Age: 46
Discharge: HOME | End: 2021-03-14
Attending: EMERGENCY MEDICINE
Payer: MEDICARE

## 2021-03-14 VITALS
DIASTOLIC BLOOD PRESSURE: 76 MMHG | TEMPERATURE: 99 F | SYSTOLIC BLOOD PRESSURE: 116 MMHG | WEIGHT: 210 LBS | OXYGEN SATURATION: 97 % | RESPIRATION RATE: 19 BRPM | BODY MASS INDEX: 32.96 KG/M2 | HEART RATE: 81 BPM | HEIGHT: 67 IN

## 2021-03-14 DIAGNOSIS — R51.9 NONINTRACTABLE HEADACHE, UNSPECIFIED CHRONICITY PATTERN, UNSPECIFIED HEADACHE TYPE: ICD-10-CM

## 2021-03-14 DIAGNOSIS — G40.909 SEIZURE DISORDER (HCC): Primary | ICD-10-CM

## 2021-03-14 LAB
ALBUMIN SERPL-MCNC: 3.5 G/DL (ref 3.4–5)
ALBUMIN/GLOB SERPL: 1 {RATIO} (ref 1–2)
ALP LIVER SERPL-CCNC: 57 U/L
ALT SERPL-CCNC: 31 U/L
ANION GAP SERPL CALC-SCNC: 6 MMOL/L (ref 0–18)
AST SERPL-CCNC: 11 U/L (ref 15–37)
BASOPHILS # BLD AUTO: 0.04 X10(3) UL (ref 0–0.2)
BASOPHILS NFR BLD AUTO: 0.5 %
BILIRUB SERPL-MCNC: 0.2 MG/DL (ref 0.1–2)
BUN BLD-MCNC: 18 MG/DL (ref 7–18)
BUN/CREAT SERPL: 18.4 (ref 10–20)
CALCIUM BLD-MCNC: 8.6 MG/DL (ref 8.5–10.1)
CHLORIDE SERPL-SCNC: 112 MMOL/L (ref 98–112)
CO2 SERPL-SCNC: 21 MMOL/L (ref 21–32)
CREAT BLD-MCNC: 0.98 MG/DL
DEPRECATED RDW RBC AUTO: 47.6 FL (ref 35.1–46.3)
EOSINOPHIL # BLD AUTO: 0.14 X10(3) UL (ref 0–0.7)
EOSINOPHIL NFR BLD AUTO: 1.8 %
ERYTHROCYTE [DISTWIDTH] IN BLOOD BY AUTOMATED COUNT: 14 % (ref 11–15)
GLOBULIN PLAS-MCNC: 3.4 G/DL (ref 2.8–4.4)
GLUCOSE BLD-MCNC: 93 MG/DL (ref 70–99)
HCT VFR BLD AUTO: 42.6 %
HGB BLD-MCNC: 13.9 G/DL
IMM GRANULOCYTES # BLD AUTO: 0.05 X10(3) UL (ref 0–1)
IMM GRANULOCYTES NFR BLD: 0.6 %
LYMPHOCYTES # BLD AUTO: 1.79 X10(3) UL (ref 1–4)
LYMPHOCYTES NFR BLD AUTO: 22.8 %
M PROTEIN MFR SERPL ELPH: 6.9 G/DL (ref 6.4–8.2)
MCH RBC QN AUTO: 30.2 PG (ref 26–34)
MCHC RBC AUTO-ENTMCNC: 32.6 G/DL (ref 31–37)
MCV RBC AUTO: 92.4 FL
MONOCYTES # BLD AUTO: 0.55 X10(3) UL (ref 0.1–1)
MONOCYTES NFR BLD AUTO: 7 %
NEUTROPHILS # BLD AUTO: 5.27 X10 (3) UL (ref 1.5–7.7)
NEUTROPHILS # BLD AUTO: 5.27 X10(3) UL (ref 1.5–7.7)
NEUTROPHILS NFR BLD AUTO: 67.3 %
OSMOLALITY SERPL CALC.SUM OF ELEC: 290 MOSM/KG (ref 275–295)
PLATELET # BLD AUTO: 214 10(3)UL (ref 150–450)
POTASSIUM SERPL-SCNC: 3.8 MMOL/L (ref 3.5–5.1)
RBC # BLD AUTO: 4.61 X10(6)UL
SODIUM SERPL-SCNC: 139 MMOL/L (ref 136–145)
WBC # BLD AUTO: 7.8 X10(3) UL (ref 4–11)

## 2021-03-14 PROCEDURE — 96361 HYDRATE IV INFUSION ADD-ON: CPT

## 2021-03-14 PROCEDURE — 96374 THER/PROPH/DIAG INJ IV PUSH: CPT

## 2021-03-14 PROCEDURE — 80053 COMPREHEN METABOLIC PANEL: CPT | Performed by: EMERGENCY MEDICINE

## 2021-03-14 PROCEDURE — 96375 TX/PRO/DX INJ NEW DRUG ADDON: CPT

## 2021-03-14 PROCEDURE — 99284 EMERGENCY DEPT VISIT MOD MDM: CPT

## 2021-03-14 PROCEDURE — 85025 COMPLETE CBC W/AUTO DIFF WBC: CPT | Performed by: EMERGENCY MEDICINE

## 2021-03-14 RX ORDER — TOPIRAMATE 100 MG/1
200 TABLET, FILM COATED ORAL ONCE
Status: COMPLETED | OUTPATIENT
Start: 2021-03-14 | End: 2021-03-14

## 2021-03-14 RX ORDER — LAMOTRIGINE 200 MG/1
200 TABLET ORAL ONCE
Status: COMPLETED | OUTPATIENT
Start: 2021-03-14 | End: 2021-03-14

## 2021-03-14 RX ORDER — DIPHENHYDRAMINE HYDROCHLORIDE 50 MG/ML
25 INJECTION INTRAMUSCULAR; INTRAVENOUS ONCE
Status: COMPLETED | OUTPATIENT
Start: 2021-03-14 | End: 2021-03-14

## 2021-03-14 RX ORDER — KETOROLAC TROMETHAMINE 30 MG/ML
15 INJECTION, SOLUTION INTRAMUSCULAR; INTRAVENOUS ONCE
Status: COMPLETED | OUTPATIENT
Start: 2021-03-14 | End: 2021-03-14

## 2021-03-14 RX ORDER — METOCLOPRAMIDE HYDROCHLORIDE 5 MG/ML
10 INJECTION INTRAMUSCULAR; INTRAVENOUS ONCE
Status: COMPLETED | OUTPATIENT
Start: 2021-03-14 | End: 2021-03-14

## 2021-03-14 NOTE — ED PROVIDER NOTES
Patient Seen in: [facility] Emergency Department      History   Patient presents with:  Seizure Disorder    Stated Complaint: seizure    HPI/Subjective:   HPI    This a 81-year-old woman, history of seizure disorder migraine headaches, here for eval HISTORY      shoulder replacement   • OTHER SURGICAL HISTORY      complete jaw reconstruction    • TONSILLECTOMY                  Social History    Tobacco Use      Smoking status: Never Smoker      Smokeless tobacco: Never Used    Vaping Use      Vaping U DIFFERENTIAL WITH PLATELET    Narrative: The following orders were created for panel order CBC WITH DIFFERENTIAL WITH PLATELET.   Procedure                               Abnormality         Status                     ---------

## 2021-03-14 NOTE — ED INITIAL ASSESSMENT (HPI)
Patient presents via EMS from work after a seizure. She has a history of seizures, most recently about a month ago. Patient was given intranasal versed by EMS. She reports she started her menses today which usually is associated with her seizure activity.

## 2021-04-09 ENCOUNTER — IMMUNIZATION (OUTPATIENT)
Dept: LAB | Age: 46
End: 2021-04-09
Attending: HOSPITALIST
Payer: MEDICARE

## 2021-04-09 DIAGNOSIS — Z23 NEED FOR VACCINATION: Primary | ICD-10-CM

## 2021-04-09 PROCEDURE — 0001A SARSCOV2 VAC 30MCG/0.3ML IM: CPT

## 2021-04-29 ENCOUNTER — HOSPITAL ENCOUNTER (EMERGENCY)
Facility: HOSPITAL | Age: 46
Discharge: HOME | End: 2021-04-29
Attending: EMERGENCY MEDICINE
Payer: MEDICARE

## 2021-04-29 VITALS
HEART RATE: 87 BPM | TEMPERATURE: 98 F | RESPIRATION RATE: 19 BRPM | BODY MASS INDEX: 33 KG/M2 | OXYGEN SATURATION: 98 % | SYSTOLIC BLOOD PRESSURE: 105 MMHG | DIASTOLIC BLOOD PRESSURE: 58 MMHG | WEIGHT: 211.63 LBS

## 2021-04-29 DIAGNOSIS — G40.909 SEIZURE DISORDER (HCC): Primary | ICD-10-CM

## 2021-04-29 PROCEDURE — 36415 COLL VENOUS BLD VENIPUNCTURE: CPT

## 2021-04-29 PROCEDURE — 80048 BASIC METABOLIC PNL TOTAL CA: CPT | Performed by: EMERGENCY MEDICINE

## 2021-04-29 PROCEDURE — 99283 EMERGENCY DEPT VISIT LOW MDM: CPT

## 2021-04-29 NOTE — ED INITIAL ASSESSMENT (HPI)
Per EMS, patient had seizure that lasted about 5 minutes according to bystanders and before their arrival. Patient then had two more seizures that last about 30-60 each and stopped with IN versed.  Patient is oriented to person and some situation but lethar

## 2021-04-29 NOTE — ED PROVIDER NOTES
Patient Seen in: [facility] Emergency Department      History   Patient presents with:  Seizure Disorder    Stated Complaint: Seizure     HPI/Subjective:   HPI    43-year-old white female presents emergency room today after having a seizure.   Ravinder Review of Systems    Positive for stated complaint: Seizure   Other systems are as noted in HPI. Constitutional and vital signs reviewed. All other systems reviewed and negative except as noted above.     Physical Exam     ED Triage Vitals [04/2 hours and as the Versed wore off she became more awake and conversant and gave me the history about her seizures. Patient had no seizures while she was here.   Patient will be discharged home to follow-up with her primary care physician and her neurologist

## 2021-05-01 ENCOUNTER — IMMUNIZATION (OUTPATIENT)
Dept: LAB | Age: 46
End: 2021-05-01
Attending: HOSPITALIST
Payer: MEDICARE

## 2021-05-01 DIAGNOSIS — Z23 NEED FOR VACCINATION: Primary | ICD-10-CM

## 2021-05-01 PROCEDURE — 0002A SARSCOV2 VAC 30MCG/0.3ML IM: CPT

## 2021-05-11 ENCOUNTER — APPOINTMENT (OUTPATIENT)
Dept: CT IMAGING | Facility: HOSPITAL | Age: 46
End: 2021-05-11
Attending: EMERGENCY MEDICINE
Payer: MEDICARE

## 2021-05-11 ENCOUNTER — HOSPITAL ENCOUNTER (EMERGENCY)
Facility: HOSPITAL | Age: 46
Discharge: HOME | End: 2021-05-11
Attending: EMERGENCY MEDICINE
Payer: MEDICARE

## 2021-05-11 VITALS
WEIGHT: 218 LBS | SYSTOLIC BLOOD PRESSURE: 125 MMHG | RESPIRATION RATE: 18 BRPM | DIASTOLIC BLOOD PRESSURE: 84 MMHG | BODY MASS INDEX: 34 KG/M2 | HEART RATE: 92 BPM | OXYGEN SATURATION: 100 % | TEMPERATURE: 100 F

## 2021-05-11 DIAGNOSIS — G40.909 SEIZURE DISORDER (HCC): Primary | ICD-10-CM

## 2021-05-11 PROCEDURE — 96361 HYDRATE IV INFUSION ADD-ON: CPT | Performed by: EMERGENCY MEDICINE

## 2021-05-11 PROCEDURE — 99284 EMERGENCY DEPT VISIT MOD MDM: CPT | Performed by: EMERGENCY MEDICINE

## 2021-05-11 PROCEDURE — 93005 ELECTROCARDIOGRAM TRACING: CPT

## 2021-05-11 PROCEDURE — 70450 CT HEAD/BRAIN W/O DYE: CPT | Performed by: EMERGENCY MEDICINE

## 2021-05-11 PROCEDURE — 99285 EMERGENCY DEPT VISIT HI MDM: CPT | Performed by: EMERGENCY MEDICINE

## 2021-05-11 PROCEDURE — 96375 TX/PRO/DX INJ NEW DRUG ADDON: CPT | Performed by: EMERGENCY MEDICINE

## 2021-05-11 PROCEDURE — 85025 COMPLETE CBC W/AUTO DIFF WBC: CPT | Performed by: EMERGENCY MEDICINE

## 2021-05-11 PROCEDURE — 93010 ELECTROCARDIOGRAM REPORT: CPT | Performed by: EMERGENCY MEDICINE

## 2021-05-11 PROCEDURE — 80053 COMPREHEN METABOLIC PANEL: CPT | Performed by: EMERGENCY MEDICINE

## 2021-05-11 PROCEDURE — 96374 THER/PROPH/DIAG INJ IV PUSH: CPT | Performed by: EMERGENCY MEDICINE

## 2021-05-11 RX ORDER — DIPHENHYDRAMINE HYDROCHLORIDE 50 MG/ML
25 INJECTION INTRAMUSCULAR; INTRAVENOUS ONCE
Status: COMPLETED | OUTPATIENT
Start: 2021-05-11 | End: 2021-05-11

## 2021-05-11 RX ORDER — METOCLOPRAMIDE HYDROCHLORIDE 5 MG/ML
10 INJECTION INTRAMUSCULAR; INTRAVENOUS ONCE
Status: COMPLETED | OUTPATIENT
Start: 2021-05-11 | End: 2021-05-11

## 2021-05-11 RX ORDER — MORPHINE SULFATE 4 MG/ML
4 INJECTION, SOLUTION INTRAMUSCULAR; INTRAVENOUS EVERY 30 MIN PRN
Status: DISCONTINUED | OUTPATIENT
Start: 2021-05-11 | End: 2021-05-11

## 2021-05-11 RX ORDER — LORAZEPAM 2 MG/ML
1 INJECTION INTRAMUSCULAR ONCE
Status: COMPLETED | OUTPATIENT
Start: 2021-05-11 | End: 2021-05-11

## 2021-05-11 NOTE — ED PROVIDER NOTES
Patient Seen in: [facility] Emergency Department      History   Patient presents with:  Seizure Disorder    Stated Complaint: seizure    HPI/Subjective:   HPI    The patient is a 28-year-old female with a history of recurrent seizures, migraine head rigidity  Cardiovascular:    Regular rhythm without murmurs rubs or gallops, no peripheral edema or JVD. Radial and DP pulses 2+ bilaterally. Lungs: Speaking full sentences without any distress or retractions.  Clear to auscultation bilaterally no wheezes similar to her previous EKG obtained on February 14,021. On arrival of the patient an EKG was obtained which showed no acute process. The patient was placed on continuous pulse oximetry and cardiac telemetry.   Blood was obtained and periphera Finalized by (CST): Jaida Ruvalcaba MD on 5/11/2021 at 5:02 PM              Patient continued to be well-appearing in the ED. She felt improved on my reexamination. She does have a mild metabolic acidosis on labs. Likely due to seizure activity.

## 2021-05-11 NOTE — ED INITIAL ASSESSMENT (HPI)
Pt had 2 witnessed seizures today lasting 30-60 seconds.  Pt states she was feeling fine this morning but now c/o migraine

## 2021-05-18 ENCOUNTER — HOSPITAL ENCOUNTER (OUTPATIENT)
Age: 46
Discharge: HOME | End: 2021-05-18
Payer: MEDICARE

## 2021-05-18 ENCOUNTER — APPOINTMENT (OUTPATIENT)
Dept: GENERAL RADIOLOGY | Age: 46
End: 2021-05-18
Attending: NURSE PRACTITIONER
Payer: MEDICARE

## 2021-05-18 VITALS
DIASTOLIC BLOOD PRESSURE: 79 MMHG | TEMPERATURE: 99 F | HEART RATE: 94 BPM | RESPIRATION RATE: 16 BRPM | SYSTOLIC BLOOD PRESSURE: 117 MMHG | OXYGEN SATURATION: 100 %

## 2021-05-18 DIAGNOSIS — G40.909 RECURRENT SEIZURES (HCC): ICD-10-CM

## 2021-05-18 DIAGNOSIS — M25.561 ACUTE PAIN OF RIGHT KNEE: ICD-10-CM

## 2021-05-18 DIAGNOSIS — S62.664A CLOSED NONDISPLACED FRACTURE OF DISTAL PHALANX OF RIGHT RING FINGER, INITIAL ENCOUNTER: Primary | ICD-10-CM
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
## 2021-05-18 PROCEDURE — 99214 OFFICE O/P EST MOD 30 MIN: CPT | Performed by: NURSE PRACTITIONER

## 2021-05-18 PROCEDURE — A6449 LT COMPRES BAND >=3" <5"/YD: HCPCS | Performed by: NURSE PRACTITIONER

## 2021-05-18 PROCEDURE — 73560 X-RAY EXAM OF KNEE 1 OR 2: CPT | Performed by: NURSE PRACTITIONER

## 2021-05-18 PROCEDURE — 73140 X-RAY EXAM OF FINGER(S): CPT | Performed by: NURSE PRACTITIONER

## 2021-05-18 PROCEDURE — A4570 SPLINT: HCPCS | Performed by: NURSE PRACTITIONER

## 2021-05-18 NOTE — ED INITIAL ASSESSMENT (HPI)
Hx of epilepsy. Had 2 seizures this past week and has notified her neurologist. Here today because on Sunday, pt had a 2 minute seizure and fell off her bed onto a wood floor.   Pt injured right 4th finger, right knee, left hip and hit her forehead on a jeff

## 2021-05-18 NOTE — ED PROVIDER NOTES
Patient Seen in: Immediate [address]      History   Patient presents with:  Finger Pain  Knee Pain    Stated Complaint: SEIZURE / FINGER & KNEE PAIN    HPI/Subjective:    This is a 46-year-old female with a history of recurrent pseudo and epil • OTHER      Neurostimulator history. This was removed but wires are still present.   Unable to get brain MRI   • OTHER SURGICAL HISTORY      cervical fusion   • OTHER SURGICAL HISTORY      shoulder replacement   • OTHER SURGICAL HISTORY      complete jaw membrane, ear canal and external ear normal. There is no impacted cerumen. Left Ear: Tympanic membrane, ear canal and external ear normal. There is no impacted cerumen.       Nose: Nose normal.      Mouth/Throat:      Mouth: Mucous membranes are moist. Mental Status: She is alert and oriented to person, place, and time.    Psychiatric:         Mood and Affect: Mood normal.         Behavior: Behavior normal.               ED Course   Labs Reviewed - No data to display       X-ray of the right knee and the Closed nondisplaced fracture of distal phalanx of right ring finger, initial encounter  (primary encounter diagnosis)  Acute pain of right knee  Fall, initial encounter  Recurrent seizures [facility])     Disposition:  Discharge  5/18/2021  3:06 pm    Follow-up:

## 2021-05-23 ENCOUNTER — HOSPITAL ENCOUNTER (OUTPATIENT)
Age: 46
Discharge: HOME | End: 2021-05-23
Payer: MEDICARE

## 2021-05-23 VITALS
DIASTOLIC BLOOD PRESSURE: 85 MMHG | OXYGEN SATURATION: 100 % | HEIGHT: 67 IN | SYSTOLIC BLOOD PRESSURE: 126 MMHG | RESPIRATION RATE: 20 BRPM | HEART RATE: 97 BPM | BODY MASS INDEX: 33.12 KG/M2 | WEIGHT: 211 LBS | TEMPERATURE: 97 F

## 2021-05-23 DIAGNOSIS — J02.0 STREP PHARYNGITIS: Primary | ICD-10-CM

## 2021-05-23 PROCEDURE — 99213 OFFICE O/P EST LOW 20 MIN: CPT | Performed by: NURSE PRACTITIONER

## 2021-05-23 PROCEDURE — 87880 STREP A ASSAY W/OPTIC: CPT | Performed by: NURSE PRACTITIONER

## 2021-05-23 RX ORDER — AMOXICILLIN 500 MG/1
500 TABLET, FILM COATED ORAL 3 TIMES DAILY
Qty: 30 TABLET | Refills: 0 | Status: SHIPPED | OUTPATIENT
Start: 2021-05-23 | End: 2021-06-02

## 2021-05-23 RX ORDER — PREDNISONE 20 MG/1
40 TABLET ORAL DAILY
Qty: 10 TABLET | Refills: 0 | Status: SHIPPED | OUTPATIENT
Start: 2021-05-23 | End: 2021-05-28

## 2021-05-23 NOTE — ED PROVIDER NOTES
Patient Seen in: Immediate [address] [place] [address] Highway      History   Patient presents with:  Stuffy Nose  Cough/URI  Sore Throat    Stated Complaint: cough, sinus, and headache x 2 days    HPI/Subjective:   18-year-old female presents to immediate care with n Negative. Psychiatric/Behavioral: Negative. Positive for stated complaint: cough, sinus, and headache x 2 days  Other systems are as noted in HPI. Constitutional and vital signs reviewed.       All other systems reviewed and negative except as no instructions regarding their diagnosis, expectations, follow up, and return to the ER precautions. I explained to the patient that emergent conditions may arise to return to the immediate care or ER for new, worsening or any persistent conditions.   I've e

## 2021-05-23 NOTE — ED INITIAL ASSESSMENT (HPI)
Pt c/o 2 days history of sore throat, cough and nasal congestion. Pt states she has completed her covid vaccine.

## 2021-06-02 ENCOUNTER — APPOINTMENT (OUTPATIENT)
Dept: CT IMAGING | Facility: HOSPITAL | Age: 46
End: 2021-06-02
Attending: EMERGENCY MEDICINE
Payer: MEDICARE

## 2021-06-02 ENCOUNTER — HOSPITAL ENCOUNTER (EMERGENCY)
Facility: HOSPITAL | Age: 46
Discharge: HOME | End: 2021-06-02
Attending: EMERGENCY MEDICINE
Payer: MEDICARE

## 2021-06-02 VITALS
SYSTOLIC BLOOD PRESSURE: 107 MMHG | DIASTOLIC BLOOD PRESSURE: 70 MMHG | OXYGEN SATURATION: 98 % | BODY MASS INDEX: 33.12 KG/M2 | TEMPERATURE: 98 F | HEART RATE: 93 BPM | HEIGHT: 67.01 IN | RESPIRATION RATE: 20 BRPM | WEIGHT: 211 LBS

## 2021-06-02 DIAGNOSIS — G40.909 SEIZURE DISORDER (HCC): Primary | ICD-10-CM

## 2021-06-02 PROCEDURE — 99285 EMERGENCY DEPT VISIT HI MDM: CPT

## 2021-06-02 PROCEDURE — 80053 COMPREHEN METABOLIC PANEL: CPT | Performed by: EMERGENCY MEDICINE

## 2021-06-02 PROCEDURE — 93010 ELECTROCARDIOGRAM REPORT: CPT

## 2021-06-02 PROCEDURE — 85025 COMPLETE CBC W/AUTO DIFF WBC: CPT | Performed by: EMERGENCY MEDICINE

## 2021-06-02 PROCEDURE — 99284 EMERGENCY DEPT VISIT MOD MDM: CPT

## 2021-06-02 PROCEDURE — 70450 CT HEAD/BRAIN W/O DYE: CPT | Performed by: EMERGENCY MEDICINE

## 2021-06-02 PROCEDURE — 93005 ELECTROCARDIOGRAM TRACING: CPT

## 2021-06-02 PROCEDURE — 96375 TX/PRO/DX INJ NEW DRUG ADDON: CPT

## 2021-06-02 PROCEDURE — 96374 THER/PROPH/DIAG INJ IV PUSH: CPT

## 2021-06-02 PROCEDURE — 96361 HYDRATE IV INFUSION ADD-ON: CPT

## 2021-06-02 PROCEDURE — 82077 ASSAY SPEC XCP UR&BREATH IA: CPT | Performed by: EMERGENCY MEDICINE

## 2021-06-02 RX ORDER — DIPHENHYDRAMINE HYDROCHLORIDE 50 MG/ML
25 INJECTION INTRAMUSCULAR; INTRAVENOUS ONCE
Status: COMPLETED | OUTPATIENT
Start: 2021-06-02 | End: 2021-06-02

## 2021-06-02 RX ORDER — METOCLOPRAMIDE HYDROCHLORIDE 5 MG/ML
10 INJECTION INTRAMUSCULAR; INTRAVENOUS ONCE
Status: COMPLETED | OUTPATIENT
Start: 2021-06-02 | End: 2021-06-02

## 2021-06-02 RX ORDER — SODIUM CHLORIDE 9 MG/ML
125 INJECTION, SOLUTION INTRAVENOUS CONTINUOUS
Status: DISCONTINUED | OUTPATIENT
Start: 2021-06-02 | End: 2021-06-02

## 2021-06-02 RX ORDER — KETOROLAC TROMETHAMINE 30 MG/ML
15 INJECTION, SOLUTION INTRAMUSCULAR; INTRAVENOUS ONCE
Status: COMPLETED | OUTPATIENT
Start: 2021-06-02 | End: 2021-06-02

## 2021-06-02 NOTE — ED PROVIDER NOTES
Patient Seen in: [facility] Emergency Department      History   Patient presents with:  Seizure Disorder    Stated Complaint: seizure    HPI/Subjective:   HPI    This is a pleasant 43-year-old female with a history of seizure disorder and migraines Review of Systems    Positive for stated complaint: seizure  Other systems are as noted in HPI. Constitutional and vital signs reviewed. All other systems reviewed and negative except as noted above.     Physical Exam     ED Triage Vitals [06/02 Abnormality         Status                     ---------                               -----------         ------                     CBC W/ DIFFERENTIAL[[id number]]                              Final result                 Please view results for these

## 2021-06-02 NOTE — ED INITIAL ASSESSMENT (HPI)
Seizure at home today with fall. Did not hit head. Hx seizures, takes medications for this. 4 versed in ambulance.

## 2021-06-03 ENCOUNTER — HOSPITAL ENCOUNTER (OUTPATIENT)
Age: 46
Discharge: HOME | End: 2021-06-03
Payer: MEDICARE

## 2021-06-03 ENCOUNTER — APPOINTMENT (OUTPATIENT)
Dept: GENERAL RADIOLOGY | Age: 46
End: 2021-06-03
Attending: PHYSICIAN ASSISTANT
Payer: MEDICARE

## 2021-06-03 VITALS
WEIGHT: 215 LBS | OXYGEN SATURATION: 98 % | RESPIRATION RATE: 18 BRPM | DIASTOLIC BLOOD PRESSURE: 76 MMHG | SYSTOLIC BLOOD PRESSURE: 126 MMHG | HEART RATE: 97 BPM | HEIGHT: 67 IN | BODY MASS INDEX: 33.74 KG/M2 | TEMPERATURE: 98 F

## 2021-06-03 DIAGNOSIS — M25.512 ACUTE PAIN OF LEFT SHOULDER: Primary | ICD-10-CM

## 2021-06-03 PROCEDURE — A4565 SLINGS: HCPCS | Performed by: PHYSICIAN ASSISTANT

## 2021-06-03 PROCEDURE — 99213 OFFICE O/P EST LOW 20 MIN: CPT | Performed by: PHYSICIAN ASSISTANT

## 2021-06-03 PROCEDURE — 73030 X-RAY EXAM OF SHOULDER: CPT | Performed by: PHYSICIAN ASSISTANT

## 2021-06-03 NOTE — ED QUICK NOTES
Pt ambulated with steady gait to rm 2. Pt states she had x 11 [place] seizures yesterday, PA notified. Pt side rails up on cart and padded with call light at bedside.

## 2021-06-03 NOTE — ED INITIAL ASSESSMENT (HPI)
Pt c/o left shoulder injury S/P having 11 [place] seizures yesterday evening that were witnessed by family. Pt states she noticed the pain when she went to her job today.  Pt did fall onto the pavement when she started seizing and thinks she may have inj

## 2021-06-03 NOTE — ED PROVIDER NOTES
Patient Seen in: Immediate [address] [place] [address]      History   Patient presents with:  Shoulder Injury    Stated Complaint: shoulder pain x 2 days    HPI/Subjective:   HPI    50-year-old female presents to the immediate care with left shoulder pain sinc HPI.  Constitutional and vital signs reviewed. All other systems reviewed and negative except as noted above.     Physical Exam     ED Triage Vitals   BP 06/03/21 1709 126/76   Pulse 06/03/21 1709 97   Resp 06/03/21 1709 18   Temp 06/03/21 1709 98.1 °F FINDINGS:  Negative for fracture, dislocation, deformity or other acute bony abnormality. No soft tissue abnormalities. CONCLUSION:  No acute fracture or other acute bony process.    Dictated by (CST): Oanh Vogt MD on 6/03/2021 at 5:49 PM

## 2021-06-08 PROBLEM — S83.411A MCL SPRAIN OF RIGHT KNEE: Status: ACTIVE | Noted: 2021-06-08

## 2021-06-08 PROBLEM — M25.512 ACUTE PAIN OF LEFT SHOULDER: Status: ACTIVE | Noted: 2021-06-08

## 2021-06-18 ENCOUNTER — HOSPITAL ENCOUNTER (EMERGENCY)
Facility: HOSPITAL | Age: 46
Discharge: HOME | End: 2021-06-18
Attending: EMERGENCY MEDICINE
Payer: MEDICARE

## 2021-06-18 VITALS
TEMPERATURE: 98 F | SYSTOLIC BLOOD PRESSURE: 123 MMHG | HEART RATE: 95 BPM | DIASTOLIC BLOOD PRESSURE: 92 MMHG | BODY MASS INDEX: 34.53 KG/M2 | HEIGHT: 67 IN | OXYGEN SATURATION: 96 % | RESPIRATION RATE: 18 BRPM | WEIGHT: 220 LBS

## 2021-06-18 DIAGNOSIS — M54.32 SCIATICA OF LEFT SIDE: Primary | ICD-10-CM

## 2021-06-18 PROCEDURE — 96374 THER/PROPH/DIAG INJ IV PUSH: CPT

## 2021-06-18 PROCEDURE — 96375 TX/PRO/DX INJ NEW DRUG ADDON: CPT

## 2021-06-18 PROCEDURE — 99284 EMERGENCY DEPT VISIT MOD MDM: CPT

## 2021-06-18 RX ORDER — KETOROLAC TROMETHAMINE 30 MG/ML
15 INJECTION, SOLUTION INTRAMUSCULAR; INTRAVENOUS ONCE
Status: COMPLETED | OUTPATIENT
Start: 2021-06-18 | End: 2021-06-18

## 2021-06-18 RX ORDER — HYDROMORPHONE HYDROCHLORIDE 1 MG/ML
1 INJECTION, SOLUTION INTRAMUSCULAR; INTRAVENOUS; SUBCUTANEOUS EVERY 30 MIN PRN
Status: DISCONTINUED | OUTPATIENT
Start: 2021-06-18 | End: 2021-06-18

## 2021-06-18 RX ORDER — ONDANSETRON 2 MG/ML
4 INJECTION INTRAMUSCULAR; INTRAVENOUS ONCE
Status: COMPLETED | OUTPATIENT
Start: 2021-06-18 | End: 2021-06-18

## 2021-06-18 RX ORDER — METHYLPREDNISOLONE 4 MG/1
TABLET ORAL
Qty: 1 EACH | Refills: 0 | Status: SHIPPED | OUTPATIENT
Start: 2021-06-18 | End: 2021-07-30

## 2021-06-18 RX ORDER — METHYLPREDNISOLONE SODIUM SUCCINATE 125 MG/2ML
125 INJECTION, POWDER, LYOPHILIZED, FOR SOLUTION INTRAMUSCULAR; INTRAVENOUS ONCE
Status: COMPLETED | OUTPATIENT
Start: 2021-06-18 | End: 2021-06-18

## 2021-06-18 RX ORDER — HYDROCODONE BITARTRATE AND ACETAMINOPHEN 5; 325 MG/1; MG/1
1-2 TABLET ORAL EVERY 6 HOURS PRN
Qty: 20 TABLET | Refills: 0 | Status: SHIPPED | OUTPATIENT
Start: 2021-06-18 | End: 2021-06-25

## 2021-06-18 NOTE — ED PROVIDER NOTES
Patient Seen in: [facility] Emergency Department      History   Patient presents with:  Back Pain    Stated Complaint: SCIATICA PAIN    HPI/Subjective:   HPI    Is a pleasant 43-year-old female coming for complaints of sciatica.   She says she has ha Frequency: 5.0 times per week      Types: Cannabis      Comment: Medical Marijuana             Review of Systems    Positive for stated complaint: SCIATICA PAIN  Other systems are as noted in HPI. Constitutional and vital signs reviewed.       All other sy days            Medications Prescribed:  Current Discharge Medication List    START taking these medications    HYDROcodone-acetaminophen 5-325 MG Oral Tab  Take 1-2 tablets by mouth every 6 (six) hours as needed.   Qty: 20 tablet Refills: 0    methylPREDNI

## 2021-06-18 NOTE — ED INITIAL ASSESSMENT (HPI)
Pt reports low back pain with radiation down left buttock starting yesterday. Ambulated to room with steady gait.

## 2021-07-30 ENCOUNTER — HOSPITAL ENCOUNTER (OUTPATIENT)
Age: 46
Discharge: HOME | End: 2021-07-30
Payer: MEDICARE

## 2021-07-30 VITALS
WEIGHT: 224 LBS | TEMPERATURE: 98 F | SYSTOLIC BLOOD PRESSURE: 103 MMHG | RESPIRATION RATE: 18 BRPM | OXYGEN SATURATION: 99 % | HEIGHT: 67 IN | BODY MASS INDEX: 35.16 KG/M2 | DIASTOLIC BLOOD PRESSURE: 74 MMHG | HEART RATE: 89 BPM

## 2021-07-30 DIAGNOSIS — H66.001 NON-RECURRENT ACUTE SUPPURATIVE OTITIS MEDIA OF RIGHT EAR WITHOUT SPONTANEOUS RUPTURE OF TYMPANIC MEMBRANE: Primary | ICD-10-CM

## 2021-07-30 DIAGNOSIS — J06.9 UPPER RESPIRATORY TRACT INFECTION, UNSPECIFIED TYPE: ICD-10-CM

## 2021-07-30 DIAGNOSIS — R05.9 COUGH: ICD-10-CM

## 2021-07-30 PROCEDURE — 99213 OFFICE O/P EST LOW 20 MIN: CPT | Performed by: PHYSICIAN ASSISTANT

## 2021-07-30 RX ORDER — ONABOTULINUMTOXINA 200 [USP'U]/1
INJECTION, POWDER, LYOPHILIZED, FOR SOLUTION INTRADERMAL; INTRAMUSCULAR
COMMUNITY

## 2021-07-30 RX ORDER — BRIVARACETAM 75 MG/1
1 TABLET, FILM COATED ORAL 2 TIMES DAILY
COMMUNITY
Start: 2021-07-06 | End: 2021-08-24

## 2021-07-30 RX ORDER — FLUTICASONE PROPIONATE 50 MCG
2 SPRAY, SUSPENSION (ML) NASAL DAILY
Qty: 16 G | Refills: 0 | Status: SHIPPED | OUTPATIENT
Start: 2021-07-30 | End: 2021-08-29

## 2021-07-30 RX ORDER — CARIPRAZINE 1.5 MG/1
CAPSULE, GELATIN COATED ORAL AS DIRECTED
COMMUNITY

## 2021-07-30 RX ORDER — BENZONATATE 100 MG/1
100 CAPSULE ORAL 3 TIMES DAILY PRN
Qty: 30 CAPSULE | Refills: 0 | Status: SHIPPED | OUTPATIENT
Start: 2021-07-30 | End: 2021-08-24

## 2021-07-30 NOTE — ED PROVIDER NOTES
Patient Seen in: [address]      History   Patient presents with:  Cough/URI  Sinus Problem    Stated Complaint: congestion, cough x 3 days    HPI/Subjective:   HPI    18-year-old pleasant female who was recently in [place] use: Yes      Frequency: 5.0 times per week      Types: Cannabis      Comment: Medical Marijuana             Review of Systems    Positive for stated complaint: congestion, cough x 3 days  Other systems are as noted in HPI.   Constitutional and vital signs acute suppurative otitis media of right ear without spontaneous rupture of tympanic membrane  (primary encounter diagnosis)  Cough  Upper respiratory tract infection, unspecified type     Disposition:  Discharge  7/30/2021  6:49 pm    Follow-up:  Kenn Raymundo

## 2021-07-31 LAB — SARS-COV-2 RNA RESP QL NAA+PROBE: NOT DETECTED

## 2021-08-02 ENCOUNTER — HOSPITAL ENCOUNTER (OUTPATIENT)
Age: 46
Discharge: HOME | End: 2021-08-02
Payer: MEDICARE

## 2021-08-02 ENCOUNTER — APPOINTMENT (OUTPATIENT)
Dept: GENERAL RADIOLOGY | Age: 46
End: 2021-08-02
Attending: NURSE PRACTITIONER
Payer: MEDICARE

## 2021-08-02 VITALS
OXYGEN SATURATION: 100 % | HEIGHT: 67 IN | DIASTOLIC BLOOD PRESSURE: 71 MMHG | RESPIRATION RATE: 18 BRPM | SYSTOLIC BLOOD PRESSURE: 103 MMHG | WEIGHT: 224 LBS | BODY MASS INDEX: 35.16 KG/M2 | HEART RATE: 97 BPM

## 2021-08-02 DIAGNOSIS — H65.01 NON-RECURRENT ACUTE SEROUS OTITIS MEDIA OF RIGHT EAR: ICD-10-CM

## 2021-08-02 DIAGNOSIS — R05.9 COUGH: Primary | ICD-10-CM

## 2021-08-02 PROCEDURE — 71046 X-RAY EXAM CHEST 2 VIEWS: CPT | Performed by: NURSE PRACTITIONER

## 2021-08-02 PROCEDURE — 99213 OFFICE O/P EST LOW 20 MIN: CPT | Performed by: NURSE PRACTITIONER

## 2021-08-02 RX ORDER — PREDNISONE 20 MG/1
20 TABLET ORAL 2 TIMES DAILY
Qty: 10 TABLET | Refills: 0 | Status: SHIPPED | OUTPATIENT
Start: 2021-08-02 | End: 2021-08-07

## 2021-08-02 RX ORDER — ALBUTEROL SULFATE 90 UG/1
2 AEROSOL, METERED RESPIRATORY (INHALATION) EVERY 4 HOURS PRN
Qty: 1 EACH | Refills: 0 | Status: SHIPPED | OUTPATIENT
Start: 2021-08-02 | End: 2021-09-01

## 2021-08-02 RX ORDER — AMOXICILLIN AND CLAVULANATE POTASSIUM 875; 125 MG/1; MG/1
1 TABLET, FILM COATED ORAL 2 TIMES DAILY
Qty: 20 TABLET | Refills: 0 | Status: SHIPPED | OUTPATIENT
Start: 2021-08-02 | End: 2021-08-12

## 2021-08-02 NOTE — ED PROVIDER NOTES
Patient Seen in: Immediate [address] [place] [address] Highway      History   Patient presents with:  Cough    Stated Complaint: Upper Resp.  Infection Worsening    HPI/Subjective:   56-year-old female who presents to the IC with complaints of worsening cough and right Use      Smoking status: Never Smoker      Smokeless tobacco: Never Used    Vaping Use      Vaping Use: Never used    Alcohol use: Not Currently      Comment: rarely    Drug use: Yes      Frequency: 5.0 times per week      Types: Cannabis      Comment: Med cardiac silhouette. MEDIASTINUM:  Normal. PLEURA:  Normal.  No pleural effusions. BONES:  Stimulator device projects over the thoracic spine. There has been previous fusion in the cervical spine. There is a suture anchor at the distal right clavicle. hours as needed for Wheezing., Normal, Disp-1 each, R-0

## 2021-08-02 NOTE — ED INITIAL ASSESSMENT (HPI)
Patient here for non productive cough  Sin kline pressure and congestion with post nasal drip  Right ear pain  worsening from her visit at [address] [place] [address] from 7/30

## 2021-08-06 ENCOUNTER — APPOINTMENT (OUTPATIENT)
Dept: CT IMAGING | Facility: HOSPITAL | Age: 46
End: 2021-08-06
Attending: EMERGENCY MEDICINE
Payer: MEDICARE

## 2021-08-06 ENCOUNTER — HOSPITAL ENCOUNTER (EMERGENCY)
Facility: HOSPITAL | Age: 46
Discharge: HOME | End: 2021-08-06
Attending: EMERGENCY MEDICINE
Payer: MEDICARE

## 2021-08-06 VITALS
TEMPERATURE: 98 F | WEIGHT: 219 LBS | OXYGEN SATURATION: 97 % | SYSTOLIC BLOOD PRESSURE: 113 MMHG | HEIGHT: 67 IN | BODY MASS INDEX: 34.37 KG/M2 | DIASTOLIC BLOOD PRESSURE: 77 MMHG | HEART RATE: 74 BPM | RESPIRATION RATE: 16 BRPM

## 2021-08-06 DIAGNOSIS — R51.9 NONINTRACTABLE HEADACHE, UNSPECIFIED CHRONICITY PATTERN, UNSPECIFIED HEADACHE TYPE: ICD-10-CM

## 2021-08-06 DIAGNOSIS — R56.9 SEIZURE-LIKE ACTIVITY (HCC): Primary | ICD-10-CM

## 2021-08-06 LAB
ALBUMIN SERPL-MCNC: 3.6 G/DL (ref 3.4–5)
ALBUMIN/GLOB SERPL: 1 {RATIO} (ref 1–2)
ALP LIVER SERPL-CCNC: 51 U/L
ALT SERPL-CCNC: 25 U/L
ANION GAP SERPL CALC-SCNC: 8 MMOL/L (ref 0–18)
AST SERPL-CCNC: 17 U/L (ref 15–37)
BASOPHILS # BLD AUTO: 0.04 X10(3) UL (ref 0–0.2)
BASOPHILS NFR BLD AUTO: 0.3 %
BILIRUB SERPL-MCNC: 0.2 MG/DL (ref 0.1–2)
BUN BLD-MCNC: 18 MG/DL (ref 7–18)
CALCIUM BLD-MCNC: 8.1 MG/DL (ref 8.5–10.1)
CHLORIDE SERPL-SCNC: 114 MMOL/L (ref 98–112)
CO2 SERPL-SCNC: 20 MMOL/L (ref 21–32)
CREAT BLD-MCNC: 1.05 MG/DL
EOSINOPHIL # BLD AUTO: 0.01 X10(3) UL (ref 0–0.7)
EOSINOPHIL NFR BLD AUTO: 0.1 %
ERYTHROCYTE [DISTWIDTH] IN BLOOD BY AUTOMATED COUNT: 13.7 %
GLOBULIN PLAS-MCNC: 3.7 G/DL (ref 2.8–4.4)
GLUCOSE BLD-MCNC: 95 MG/DL (ref 70–99)
GLUCOSE BLD-MCNC: 98 MG/DL (ref 70–99)
HCT VFR BLD AUTO: 41.1 %
HGB BLD-MCNC: 13.5 G/DL
IMM GRANULOCYTES # BLD AUTO: 0.24 X10(3) UL (ref 0–1)
IMM GRANULOCYTES NFR BLD: 1.9 %
LYMPHOCYTES # BLD AUTO: 2.61 X10(3) UL (ref 1–4)
LYMPHOCYTES NFR BLD AUTO: 20.8 %
M PROTEIN MFR SERPL ELPH: 7.3 G/DL (ref 6.4–8.2)
MCH RBC QN AUTO: 30 PG (ref 26–34)
MCHC RBC AUTO-ENTMCNC: 32.8 G/DL (ref 31–37)
MCV RBC AUTO: 91.3 FL
MONOCYTES # BLD AUTO: 0.99 X10(3) UL (ref 0.1–1)
MONOCYTES NFR BLD AUTO: 7.9 %
NEUTROPHILS # BLD AUTO: 8.64 X10 (3) UL (ref 1.5–7.7)
NEUTROPHILS # BLD AUTO: 8.64 X10(3) UL (ref 1.5–7.7)
NEUTROPHILS NFR BLD AUTO: 69 %
OSMOLALITY SERPL CALC.SUM OF ELEC: 296 MOSM/KG (ref 275–295)
PLATELET # BLD AUTO: 275 10(3)UL (ref 150–450)
POTASSIUM SERPL-SCNC: 3.7 MMOL/L (ref 3.5–5.1)
RBC # BLD AUTO: 4.5 X10(6)UL
SODIUM SERPL-SCNC: 142 MMOL/L (ref 136–145)
WBC # BLD AUTO: 12.5 X10(3) UL (ref 4–11)

## 2021-08-06 PROCEDURE — 96374 THER/PROPH/DIAG INJ IV PUSH: CPT

## 2021-08-06 PROCEDURE — 85025 COMPLETE CBC W/AUTO DIFF WBC: CPT | Performed by: EMERGENCY MEDICINE

## 2021-08-06 PROCEDURE — 99285 EMERGENCY DEPT VISIT HI MDM: CPT

## 2021-08-06 PROCEDURE — 93005 ELECTROCARDIOGRAM TRACING: CPT

## 2021-08-06 PROCEDURE — 80053 COMPREHEN METABOLIC PANEL: CPT | Performed by: EMERGENCY MEDICINE

## 2021-08-06 PROCEDURE — 96361 HYDRATE IV INFUSION ADD-ON: CPT

## 2021-08-06 PROCEDURE — 82962 GLUCOSE BLOOD TEST: CPT

## 2021-08-06 PROCEDURE — 70450 CT HEAD/BRAIN W/O DYE: CPT | Performed by: EMERGENCY MEDICINE

## 2021-08-06 PROCEDURE — 96375 TX/PRO/DX INJ NEW DRUG ADDON: CPT

## 2021-08-06 PROCEDURE — 93010 ELECTROCARDIOGRAM REPORT: CPT

## 2021-08-06 RX ORDER — METOCLOPRAMIDE HYDROCHLORIDE 5 MG/ML
10 INJECTION INTRAMUSCULAR; INTRAVENOUS ONCE
Status: COMPLETED | OUTPATIENT
Start: 2021-08-06 | End: 2021-08-06

## 2021-08-06 RX ORDER — DIPHENHYDRAMINE HYDROCHLORIDE 50 MG/ML
25 INJECTION INTRAMUSCULAR; INTRAVENOUS ONCE
Status: COMPLETED | OUTPATIENT
Start: 2021-08-06 | End: 2021-08-06

## 2021-08-06 RX ORDER — KETOROLAC TROMETHAMINE 30 MG/ML
15 INJECTION, SOLUTION INTRAMUSCULAR; INTRAVENOUS ONCE
Status: COMPLETED | OUTPATIENT
Start: 2021-08-06 | End: 2021-08-06

## 2021-08-06 NOTE — ED PROVIDER NOTES
Patient Seen in: [facility] Emergency Department      History   Patient presents with:  Seizure Disorder    Stated Complaint:     HPI/Subjective:   HPI    18-year-old female with past medical history of seizure disorder, pseudoseizures presents toda Used    Vaping Use      Vaping Use: Never used    Alcohol use: Not Currently      Comment: rarely    Drug use: Yes      Frequency: 5.0 times per week      Types: Cannabis      Comment: Medical Marijuana             Review of Systems    Positive for stated Value    Chloride 114 (*)     CO2 20.0 (*)     Creatinine 1.05 (*)     Calcium, Total 8.1 (*)     Calculated Osmolality 296 (*)     All other components within normal limits   CBC W/ DIFFERENTIAL - Abnormal; Notable for the following components:    WBC 12. (75233)    Result Date: 8/6/2021  PROCEDURE:  CT BRAIN OR HEAD (96393)  COMPARISON:  [facility] , CT, CT BRAIN OR HEAD ([id number]), 6/02/2021, 5:18 PM.  INDICATIONS:  Headache;  No applicable indication  TECHNIQUE:  Noncontrast CT scanning is performed through the neurological examination is within normal limits. Patient currently reports a headache consistent with previous migraines.   Given the headache and association with seizure like activity, will obtain CT of the head to assess for acute intracranial abnormal

## 2021-08-06 NOTE — ED INITIAL ASSESSMENT (HPI)
Pt to ED s/p seizure at [facility] she has hx of seizures and takes lamictal and topamax. She was given total of 20mg on IN versed in field. Pt awake and alert now.
no body aches/no shortness of breath/no wheezing/no fever/no headache/no chills/no diaphoresis/no chest pain

## 2021-08-07 LAB
ATRIAL RATE: 83 BPM
P AXIS: 29 DEGREES
P-R INTERVAL: 156 MS
Q-T INTERVAL: 372 MS
QRS DURATION: 86 MS
QTC CALCULATION (BEZET): 437 MS
R AXIS: 10 DEGREES
T AXIS: 48 DEGREES
VENTRICULAR RATE: 83 BPM

## 2021-08-16 ENCOUNTER — HOSPITAL ENCOUNTER (OUTPATIENT)
Age: 46
Discharge: HOME | End: 2021-08-16
Payer: MEDICARE

## 2021-08-16 ENCOUNTER — APPOINTMENT (OUTPATIENT)
Dept: CT IMAGING | Age: 46
End: 2021-08-16
Attending: PHYSICIAN ASSISTANT
Payer: MEDICARE

## 2021-08-16 VITALS
DIASTOLIC BLOOD PRESSURE: 85 MMHG | BODY MASS INDEX: 35.31 KG/M2 | RESPIRATION RATE: 16 BRPM | TEMPERATURE: 98 F | WEIGHT: 225 LBS | SYSTOLIC BLOOD PRESSURE: 127 MMHG | HEIGHT: 67 IN | HEART RATE: 70 BPM | OXYGEN SATURATION: 100 %

## 2021-08-16 DIAGNOSIS — R19.7 DIARRHEA, UNSPECIFIED TYPE: Primary | ICD-10-CM

## 2021-08-16 LAB
#MXD IC: 0.3 X10ˆ3/UL (ref 0.1–1)
BUN BLD-MCNC: 19 MG/DL (ref 7–18)
CHLORIDE BLD-SCNC: 107 MMOL/L (ref 98–112)
CO2 BLD-SCNC: 23 MMOL/L (ref 21–32)
CREAT BLD-MCNC: 1.2 MG/DL
GLUCOSE BLD-MCNC: 91 MG/DL (ref 70–99)
HCT VFR BLD AUTO: 43.2 %
HCT VFR BLD CALC: 43 %
HGB BLD-MCNC: 13.7 G/DL
ISTAT IONIZED CALCIUM FOR CHEM 8: 1.24 MMOL/L (ref 1.12–1.32)
LYMPHOCYTES # BLD AUTO: 1.8 X10ˆ3/UL (ref 1–4)
LYMPHOCYTES NFR BLD AUTO: 25.1 %
MCH RBC QN AUTO: 29.2 PG (ref 26–34)
MCHC RBC AUTO-ENTMCNC: 31.7 G/DL (ref 31–37)
MCV RBC AUTO: 92.1 FL (ref 80–100)
MIXED CELL %: 4.7 %
NEUTROPHILS # BLD AUTO: 4.9 X10ˆ3/UL (ref 1.5–7.7)
NEUTROPHILS NFR BLD AUTO: 70.2 %
PLATELET # BLD AUTO: 249 X10ˆ3/UL (ref 150–450)
POCT BILIRUBIN URINE: NEGATIVE
POCT GLUCOSE URINE: NEGATIVE MG/DL
POCT KETONE URINE: NEGATIVE MG/DL
POCT LEUKOCYTE ESTERASE URINE: NEGATIVE
POCT NITRITE URINE: NEGATIVE
POCT PH URINE: 6 (ref 5–8)
POCT PROTEIN URINE: NEGATIVE MG/DL
POCT SPECIFIC GRAVITY URINE: 1.02
POCT URINE CLARITY: CLEAR
POCT URINE COLOR: YELLOW
POCT UROBILINOGEN URINE: 0.2 MG/DL
POTASSIUM BLD-SCNC: 3.2 MMOL/L (ref 3.6–5.1)
RBC # BLD AUTO: 4.69 X10ˆ6/UL
SARS-COV-2 RNA RESP QL NAA+PROBE: NOT DETECTED
SODIUM BLD-SCNC: 142 MMOL/L (ref 136–145)
WBC # BLD AUTO: 7 X10ˆ3/UL (ref 4–11)

## 2021-08-16 PROCEDURE — 96374 THER/PROPH/DIAG INJ IV PUSH: CPT

## 2021-08-16 PROCEDURE — 81002 URINALYSIS NONAUTO W/O SCOPE: CPT | Performed by: PHYSICIAN ASSISTANT

## 2021-08-16 PROCEDURE — 99215 OFFICE O/P EST HI 40 MIN: CPT

## 2021-08-16 PROCEDURE — 80047 BASIC METABLC PNL IONIZED CA: CPT

## 2021-08-16 PROCEDURE — 99214 OFFICE O/P EST MOD 30 MIN: CPT

## 2021-08-16 PROCEDURE — 96361 HYDRATE IV INFUSION ADD-ON: CPT

## 2021-08-16 PROCEDURE — 74176 CT ABD & PELVIS W/O CONTRAST: CPT | Performed by: PHYSICIAN ASSISTANT

## 2021-08-16 PROCEDURE — 85025 COMPLETE CBC W/AUTO DIFF WBC: CPT | Performed by: PHYSICIAN ASSISTANT

## 2021-08-16 RX ORDER — ONDANSETRON 2 MG/ML
4 INJECTION INTRAMUSCULAR; INTRAVENOUS ONCE
Status: COMPLETED | OUTPATIENT
Start: 2021-08-16 | End: 2021-08-16

## 2021-08-16 RX ORDER — SODIUM CHLORIDE 9 MG/ML
1000 INJECTION, SOLUTION INTRAVENOUS ONCE
Status: COMPLETED | OUTPATIENT
Start: 2021-08-16 | End: 2021-08-16

## 2021-08-16 RX ORDER — POTASSIUM CHLORIDE 20 MEQ/1
20 TABLET, EXTENDED RELEASE ORAL ONCE
Status: COMPLETED | OUTPATIENT
Start: 2021-08-16 | End: 2021-08-16

## 2021-08-16 RX ORDER — ONDANSETRON 4 MG/1
4 TABLET, ORALLY DISINTEGRATING ORAL EVERY 8 HOURS PRN
Qty: 10 TABLET | Refills: 0 | Status: SHIPPED | OUTPATIENT
Start: 2021-08-16 | End: 2021-08-23

## 2021-08-16 NOTE — ED PROVIDER NOTES
Patient Seen in: [facility]      History   Patient presents with:  Diarrhea    Stated Complaint: abd pain    HPI/Subjective:   HPI    CHIEF COMPLAINT: Diarrhea and nausea     HISTORY OF PRESENT ILLNESS: Patient is a pleasant 30-year-old f SURGERY     • NEUROSTIMULATOR ELECTRODE      2 yrs ago   • OTHER      frontal lobectomy   • OTHER      facial surgery   • OTHER      shoulder replacement   • OTHER      cervical fusion   • OTHER      frontal and temporal lobectomies for seizures   • OTHER Abnormal; Notable for the following components:       Result Value    Blood, Urine Small (*)     All other components within normal limits   POCT ISTAT CHEM8 CARTRIDGE - Abnormal; Notable for the following components:    ISTAT BUN 19 (*)     ISTAT [place] headache. FINDINGS:  VENTRICLES/SULCI:  Ventricles and sulci are normal in size. INTRACRANIAL:  There is a stable 2.3 x 1.5 cm area of left temporal lobe encephalomalacia. There is nothing specific for acute infarct.   There is no hemorrhage or mass le Unremarkable. BILIARY:  Unremarkable. SPLEEN:  Unremarkable. PANCREAS:  Unremarkable. ADRENALS:  Unremarkable. KIDNEYS:  Punctate bilateral nonobstructing stones within the kidneys. AORTA/VASCULAR:  Unremarkable. RETROPERITONEUM:  Unremarkable.  BOWEL/MESEN C. difficile. She is given Zofran to take at home as needed for nausea. She was mildly hypokalemic and was repleted with K-Dur here. Keep a bland diet until symptoms improve then slowly advance the diet as tolerated. Follow with primary care.   We will

## 2021-08-16 NOTE — ED INITIAL ASSESSMENT (HPI)
Pt states having diarrhea for last 6 days. C/o nausea. Denies any abd pain. Poor appetite. Drinking water. States having approx 3 episodes of diarrhea a day. States has lost 8 lbs in last 5-6 lbs. Denies cough or fever.

## 2021-08-17 ENCOUNTER — TELEPHONE (OUTPATIENT)
Dept: URGENT CARE | Age: 46
End: 2021-08-17

## 2021-08-17 ENCOUNTER — LAB ENCOUNTER (OUTPATIENT)
Dept: LAB | Facility: HOSPITAL | Age: 46
End: 2021-08-17
Attending: PHYSICIAN ASSISTANT
Payer: MEDICARE

## 2021-08-17 DIAGNOSIS — R19.7 DIARRHEA, UNSPECIFIED TYPE: ICD-10-CM

## 2021-08-17 PROCEDURE — 87493 C DIFF AMPLIFIED PROBE: CPT

## 2021-08-17 PROCEDURE — 87427 SHIGA-LIKE TOXIN AG IA: CPT

## 2021-08-17 PROCEDURE — 87046 STOOL CULTR AEROBIC BACT EA: CPT

## 2021-08-17 PROCEDURE — 87045 FECES CULTURE AEROBIC BACT: CPT

## 2021-08-17 PROCEDURE — 82272 OCCULT BLD FECES 1-3 TESTS: CPT

## 2021-08-17 NOTE — ED NOTES
Pt called asked if she can go out. Pt advised no to go out and used public restroom until she gets her c diff test result.  Pt agreaable

## 2021-08-18 LAB — C DIFF TOX B STL QL: NEGATIVE

## 2021-08-19 ENCOUNTER — HOSPITAL ENCOUNTER (EMERGENCY)
Facility: HOSPITAL | Age: 46
Discharge: HOME | End: 2021-08-19
Attending: EMERGENCY MEDICINE
Payer: MEDICARE

## 2021-08-19 ENCOUNTER — APPOINTMENT (OUTPATIENT)
Dept: CT IMAGING | Facility: HOSPITAL | Age: 46
End: 2021-08-19
Attending: EMERGENCY MEDICINE
Payer: MEDICARE

## 2021-08-19 VITALS
TEMPERATURE: 98 F | OXYGEN SATURATION: 96 % | HEIGHT: 67 IN | DIASTOLIC BLOOD PRESSURE: 66 MMHG | HEART RATE: 69 BPM | SYSTOLIC BLOOD PRESSURE: 110 MMHG | WEIGHT: 217 LBS | BODY MASS INDEX: 34.06 KG/M2 | RESPIRATION RATE: 19 BRPM

## 2021-08-19 DIAGNOSIS — G40.909 SEIZURE DISORDER (HCC): Primary | ICD-10-CM

## 2021-08-19 LAB
ALBUMIN SERPL-MCNC: 3.4 G/DL (ref 3.4–5)
ALBUMIN/GLOB SERPL: 1.1 {RATIO} (ref 1–2)
ALP LIVER SERPL-CCNC: 43 U/L
ALT SERPL-CCNC: 24 U/L
ANION GAP SERPL CALC-SCNC: 7 MMOL/L (ref 0–18)
AST SERPL-CCNC: 17 U/L (ref 15–37)
ATRIAL RATE: 77 BPM
B-HCG UR QL: NEGATIVE
BASOPHILS # BLD AUTO: 0.03 X10(3) UL (ref 0–0.2)
BASOPHILS NFR BLD AUTO: 0.8 %
BILIRUB SERPL-MCNC: 0.2 MG/DL (ref 0.1–2)
BUN BLD-MCNC: 16 MG/DL (ref 7–18)
CALCIUM BLD-MCNC: 8 MG/DL (ref 8.5–10.1)
CHLORIDE SERPL-SCNC: 113 MMOL/L (ref 98–112)
CO2 SERPL-SCNC: 20 MMOL/L (ref 21–32)
CREAT BLD-MCNC: 1.08 MG/DL
EOSINOPHIL # BLD AUTO: 0.09 X10(3) UL (ref 0–0.7)
EOSINOPHIL NFR BLD AUTO: 2.3 %
ERYTHROCYTE [DISTWIDTH] IN BLOOD BY AUTOMATED COUNT: 14 %
ETHANOL SERPL-MCNC: <3 MG/DL (ref ?–3)
GLOBULIN PLAS-MCNC: 3.2 G/DL (ref 2.8–4.4)
GLUCOSE BLD-MCNC: 116 MG/DL (ref 70–99)
HCT VFR BLD AUTO: 38.6 %
HGB BLD-MCNC: 12.5 G/DL
IMM GRANULOCYTES # BLD AUTO: 0.01 X10(3) UL (ref 0–1)
IMM GRANULOCYTES NFR BLD: 0.3 %
LYMPHOCYTES # BLD AUTO: 0.9 X10(3) UL (ref 1–4)
LYMPHOCYTES NFR BLD AUTO: 23.4 %
M PROTEIN MFR SERPL ELPH: 6.6 G/DL (ref 6.4–8.2)
MCH RBC QN AUTO: 29.8 PG (ref 26–34)
MCHC RBC AUTO-ENTMCNC: 32.4 G/DL (ref 31–37)
MCV RBC AUTO: 91.9 FL
MONOCYTES # BLD AUTO: 0.38 X10(3) UL (ref 0.1–1)
MONOCYTES NFR BLD AUTO: 9.9 %
NEUTROPHILS # BLD AUTO: 2.43 X10 (3) UL (ref 1.5–7.7)
NEUTROPHILS # BLD AUTO: 2.43 X10(3) UL (ref 1.5–7.7)
NEUTROPHILS NFR BLD AUTO: 63.3 %
OSMOLALITY SERPL CALC.SUM OF ELEC: 292 MOSM/KG (ref 275–295)
P AXIS: 46 DEGREES
P-R INTERVAL: 156 MS
PLATELET # BLD AUTO: 202 10(3)UL (ref 150–450)
POTASSIUM SERPL-SCNC: 3.6 MMOL/L (ref 3.5–5.1)
Q-T INTERVAL: 386 MS
QRS DURATION: 86 MS
QTC CALCULATION (BEZET): 436 MS
R AXIS: 15 DEGREES
RBC # BLD AUTO: 4.2 X10(6)UL
SODIUM SERPL-SCNC: 140 MMOL/L (ref 136–145)
T AXIS: 44 DEGREES
VENTRICULAR RATE: 77 BPM
WBC # BLD AUTO: 3.8 X10(3) UL (ref 4–11)

## 2021-08-19 PROCEDURE — 93010 ELECTROCARDIOGRAM REPORT: CPT

## 2021-08-19 PROCEDURE — 93005 ELECTROCARDIOGRAM TRACING: CPT

## 2021-08-19 PROCEDURE — 82077 ASSAY SPEC XCP UR&BREATH IA: CPT | Performed by: EMERGENCY MEDICINE

## 2021-08-19 PROCEDURE — 99285 EMERGENCY DEPT VISIT HI MDM: CPT

## 2021-08-19 PROCEDURE — 96374 THER/PROPH/DIAG INJ IV PUSH: CPT

## 2021-08-19 PROCEDURE — 96375 TX/PRO/DX INJ NEW DRUG ADDON: CPT

## 2021-08-19 PROCEDURE — 70450 CT HEAD/BRAIN W/O DYE: CPT | Performed by: EMERGENCY MEDICINE

## 2021-08-19 PROCEDURE — 80053 COMPREHEN METABOLIC PANEL: CPT | Performed by: EMERGENCY MEDICINE

## 2021-08-19 PROCEDURE — 85025 COMPLETE CBC W/AUTO DIFF WBC: CPT | Performed by: EMERGENCY MEDICINE

## 2021-08-19 PROCEDURE — 81025 URINE PREGNANCY TEST: CPT

## 2021-08-19 PROCEDURE — 96361 HYDRATE IV INFUSION ADD-ON: CPT

## 2021-08-19 RX ORDER — DIPHENHYDRAMINE HYDROCHLORIDE 50 MG/ML
25 INJECTION INTRAMUSCULAR; INTRAVENOUS ONCE
Status: COMPLETED | OUTPATIENT
Start: 2021-08-19 | End: 2021-08-19

## 2021-08-19 RX ORDER — METOCLOPRAMIDE HYDROCHLORIDE 5 MG/ML
10 INJECTION INTRAMUSCULAR; INTRAVENOUS ONCE
Status: COMPLETED | OUTPATIENT
Start: 2021-08-19 | End: 2021-08-19

## 2021-08-19 NOTE — ED INITIAL ASSESSMENT (HPI)
Pt states she was at the [address] therapy today and had a seizures/ medics witnessed x4 seizures and gave total of 10mg of versed im

## 2021-08-19 NOTE — ED QUICK NOTES
Pt reevaluated by er physician, informed of all her test reports and plan of care.  Pt verbalizing understanding

## 2021-08-19 NOTE — ED PROVIDER NOTES
Patient Seen in: [facility] Emergency Department      History   Patient presents with:  Seizures    Stated Complaint: seizures    HPI/Subjective:   HPI    This is a 64-year-old female who arrives here with seizure-like activity.   The patient was see history. This was removed but wires are still present.   Unable to get brain MRI   • OTHER SURGICAL HISTORY      cervical fusion   • OTHER SURGICAL HISTORY      shoulder replacement   • OTHER SURGICAL HISTORY      complete jaw reconstruction    • TONSILLEC nerves are grossly intact. Muscle strength and sensory exam is grossly normal.  And the patient is neurologically intact with no focal findings.     ED Course     Labs Reviewed   COMP METABOLIC PANEL (14) - Abnormal; Notable for the following components: exam.  I discussed this case with Dr. Gabe Dwyer from neurology who will increase her brivact     . She is not to drive. She does not recommend close follow-up with a primary care physician she denies any dysuria so no urinalysis ordered.   She was discharged h

## 2021-09-03 ENCOUNTER — APPOINTMENT (OUTPATIENT)
Dept: GENERAL RADIOLOGY | Facility: HOSPITAL | Age: 46
End: 2021-09-03
Attending: EMERGENCY MEDICINE
Payer: MEDICARE

## 2021-09-03 ENCOUNTER — HOSPITAL ENCOUNTER (EMERGENCY)
Facility: HOSPITAL | Age: 46
Discharge: HOME | End: 2021-09-03
Attending: EMERGENCY MEDICINE
Payer: MEDICARE

## 2021-09-03 VITALS
OXYGEN SATURATION: 100 % | HEART RATE: 79 BPM | HEIGHT: 67 IN | DIASTOLIC BLOOD PRESSURE: 87 MMHG | WEIGHT: 207 LBS | BODY MASS INDEX: 32.49 KG/M2 | SYSTOLIC BLOOD PRESSURE: 141 MMHG | RESPIRATION RATE: 16 BRPM

## 2021-09-03 DIAGNOSIS — G40.909 SEIZURE DISORDER (HCC): Primary | ICD-10-CM

## 2021-09-03 DIAGNOSIS — S89.91XA INJURY OF RIGHT KNEE, INITIAL ENCOUNTER: ICD-10-CM

## 2021-09-03 PROCEDURE — 99284 EMERGENCY DEPT VISIT MOD MDM: CPT

## 2021-09-03 PROCEDURE — 96375 TX/PRO/DX INJ NEW DRUG ADDON: CPT

## 2021-09-03 PROCEDURE — 73562 X-RAY EXAM OF KNEE 3: CPT | Performed by: EMERGENCY MEDICINE

## 2021-09-03 PROCEDURE — 96374 THER/PROPH/DIAG INJ IV PUSH: CPT

## 2021-09-03 RX ORDER — DIPHENHYDRAMINE HYDROCHLORIDE 50 MG/ML
25 INJECTION INTRAMUSCULAR; INTRAVENOUS ONCE
Status: COMPLETED | OUTPATIENT
Start: 2021-09-03 | End: 2021-09-03

## 2021-09-03 RX ORDER — KETOROLAC TROMETHAMINE 30 MG/ML
15 INJECTION, SOLUTION INTRAMUSCULAR; INTRAVENOUS ONCE
Status: COMPLETED | OUTPATIENT
Start: 2021-09-03 | End: 2021-09-03

## 2021-09-03 RX ORDER — METOCLOPRAMIDE HYDROCHLORIDE 5 MG/ML
10 INJECTION INTRAMUSCULAR; INTRAVENOUS ONCE
Status: COMPLETED | OUTPATIENT
Start: 2021-09-03 | End: 2021-09-03

## 2021-09-03 NOTE — ED PROVIDER NOTES
Patient Seen in: [facility] Emergency Department      History   Patient presents with:  Seizure Disorder    Stated Complaint: SEIZURE    HPI/Subjective:   HPI    Patient is a 52-year-old female with a long history of seizure disorder.   On multiple a bilat shoulder surgery   • OTHER      Neurostimulator history. This was removed but wires are still present.   Unable to get brain MRI   • OTHER SURGICAL HISTORY      cervical fusion   • OTHER SURGICAL HISTORY      shoulder replacement   • OTHER SURGICA definite deformity or dislocation. Diffuse tenderness. Skin:     General: Skin is warm and dry. Neurological:      Mental Status: She is alert and oriented to person, place, and time.             ED Course   Labs Reviewed - No data to display       XR Bilateral frontal temporal craniotomies are noted. No evidence for fracture or osseous abnormality. OTHER:             None. CONCLUSION:   1. No acute intracranial process. 2. Stable encephalomalacia changes of the left temporal lobe.    Dict the left temporal lobe. 3.  Postoperative changes of previous bilateral craniotomies and left temporal frontal craniectomy.    Dictated by (CST): Samuel Jarrell MD on 8/06/2021 at 6:35 PM     Finalized by (CST): Samuel Jarrell MD on 8/06/2021 at 6:4 nonobstructing renal stones. Dictated by (CST): Ernesto Newberry MD on 8/16/2021 at 5:11 PM     Finalized by (CST): Ernesto Newberry MD on 8/16/2021 at 5:18 PM              MDM      28-year-old female with a long history of seizure disorder.   Had a se

## 2021-09-13 ENCOUNTER — HOSPITAL ENCOUNTER (OUTPATIENT)
Age: 46
Discharge: HOME | End: 2021-09-13
Payer: MEDICARE

## 2021-09-13 VITALS
HEIGHT: 67 IN | RESPIRATION RATE: 18 BRPM | OXYGEN SATURATION: 98 % | SYSTOLIC BLOOD PRESSURE: 125 MMHG | DIASTOLIC BLOOD PRESSURE: 91 MMHG | BODY MASS INDEX: 32.65 KG/M2 | HEART RATE: 97 BPM | TEMPERATURE: 98 F | WEIGHT: 208 LBS

## 2021-09-13 DIAGNOSIS — S70.362A INSECT BITE OF LEFT THIGH, INITIAL ENCOUNTER: Primary | ICD-10-CM
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
## 2021-09-13 PROCEDURE — 99213 OFFICE O/P EST LOW 20 MIN: CPT | Performed by: NURSE PRACTITIONER

## 2021-09-13 RX ORDER — DOXYCYCLINE HYCLATE 100 MG/1
100 CAPSULE ORAL 2 TIMES DAILY
Qty: 14 CAPSULE | Refills: 0 | Status: SHIPPED | OUTPATIENT
Start: 2021-09-13 | End: 2021-09-20

## 2021-09-13 NOTE — ED PROVIDER NOTES
Patient Seen in: [facility] [address] [place] [address]      History   Patient presents with:  Bite    Stated Complaint: Insect Bite    Subjective:   43-year-old female presents to immediate care for a wound to her left upper thigh.   Patient states wound has be Neurological: Negative. Positive for stated complaint: Insect Bite  Other systems are as noted in HPI. Constitutional and vital signs reviewed. All other systems reviewed and negative except as noted above.     Physical Exam     ED Triage Vit List as of 9/13/2021 12:08 PM    START taking these medications    doxycycline 100 MG Oral Cap  Take 1 capsule (100 mg total) by mouth 2 (two) times daily for 7 days. , Normal, Disp-14 capsule, R-0

## 2021-09-13 NOTE — ED INITIAL ASSESSMENT (HPI)
Possible inset bite to left thigh x 3 weeks, denies fever, has been cleaning & applying OTC antibiotic ointment.

## 2021-09-24 ENCOUNTER — APPOINTMENT (OUTPATIENT)
Dept: GENERAL RADIOLOGY | Age: 46
End: 2021-09-24
Payer: MEDICARE

## 2021-09-24 ENCOUNTER — HOSPITAL ENCOUNTER (OUTPATIENT)
Age: 46
Discharge: HOME | End: 2021-09-24
Payer: MEDICARE

## 2021-09-24 VITALS
TEMPERATURE: 98 F | HEIGHT: 67 IN | DIASTOLIC BLOOD PRESSURE: 88 MMHG | WEIGHT: 208 LBS | HEART RATE: 85 BPM | SYSTOLIC BLOOD PRESSURE: 134 MMHG | RESPIRATION RATE: 18 BRPM | OXYGEN SATURATION: 99 % | BODY MASS INDEX: 32.65 KG/M2

## 2021-09-24 DIAGNOSIS — S93.402A MILD SPRAIN OF LEFT ANKLE, INITIAL ENCOUNTER: Primary | ICD-10-CM

## 2021-09-24 PROCEDURE — A6449 LT COMPRES BAND >=3" <5"/YD: HCPCS | Performed by: NURSE PRACTITIONER

## 2021-09-24 PROCEDURE — 99213 OFFICE O/P EST LOW 20 MIN: CPT | Performed by: NURSE PRACTITIONER

## 2021-09-24 PROCEDURE — 73610 X-RAY EXAM OF ANKLE: CPT

## 2021-09-24 PROCEDURE — L4350 ANKLE CONTROL ORTHO PRE OTS: HCPCS | Performed by: NURSE PRACTITIONER

## 2021-09-24 NOTE — ED PROVIDER NOTES
Patient Seen in: [facility] [address]      History   Patient presents with:  Leg or Foot Injury    Stated Complaint: left ankle pain due to fall  x today    Subjective: This is a 43-year-old female with no significant past medical history. 98 °F (36.7 °C) (Temporal)   Resp 18   Ht 170.2 cm (5' 7\")   Wt 94.3 kg   LMP 09/07/2021 (Exact Date)   SpO2 99%   BMI 32.58 kg/m²         Physical Exam  Vitals and nursing note reviewed. Constitutional:       General: She is not in acute distress. Movement is somewhat limited due to pain, otherwise CMS intact. X-ray films reviewed myself. Results show no acute fracture of the ankle joint. Clinically appears as mild ankle sprain.     Ace wrap and ankle stirrup placed in my presence on the left lowe

## 2021-09-30 ENCOUNTER — APPOINTMENT (OUTPATIENT)
Dept: CT IMAGING | Facility: HOSPITAL | Age: 46
End: 2021-09-30
Attending: EMERGENCY MEDICINE
Payer: MEDICARE

## 2021-09-30 ENCOUNTER — HOSPITAL ENCOUNTER (EMERGENCY)
Facility: HOSPITAL | Age: 46
Discharge: HOME | End: 2021-09-30
Attending: EMERGENCY MEDICINE
Payer: MEDICARE

## 2021-09-30 VITALS
DIASTOLIC BLOOD PRESSURE: 58 MMHG | HEART RATE: 68 BPM | HEIGHT: 67 IN | TEMPERATURE: 98 F | SYSTOLIC BLOOD PRESSURE: 103 MMHG | WEIGHT: 205 LBS | RESPIRATION RATE: 17 BRPM | BODY MASS INDEX: 32.18 KG/M2 | OXYGEN SATURATION: 98 %

## 2021-09-30 DIAGNOSIS — G40.909 RECURRENT SEIZURES (HCC): Primary | ICD-10-CM

## 2021-09-30 PROCEDURE — 99284 EMERGENCY DEPT VISIT MOD MDM: CPT

## 2021-09-30 PROCEDURE — 81001 URINALYSIS AUTO W/SCOPE: CPT | Performed by: EMERGENCY MEDICINE

## 2021-09-30 PROCEDURE — 96375 TX/PRO/DX INJ NEW DRUG ADDON: CPT

## 2021-09-30 PROCEDURE — 96361 HYDRATE IV INFUSION ADD-ON: CPT

## 2021-09-30 PROCEDURE — 85025 COMPLETE CBC W/AUTO DIFF WBC: CPT | Performed by: EMERGENCY MEDICINE

## 2021-09-30 PROCEDURE — 96374 THER/PROPH/DIAG INJ IV PUSH: CPT

## 2021-09-30 PROCEDURE — 99285 EMERGENCY DEPT VISIT HI MDM: CPT

## 2021-09-30 PROCEDURE — 80053 COMPREHEN METABOLIC PANEL: CPT | Performed by: EMERGENCY MEDICINE

## 2021-09-30 PROCEDURE — 70450 CT HEAD/BRAIN W/O DYE: CPT | Performed by: EMERGENCY MEDICINE

## 2021-09-30 RX ORDER — KETOROLAC TROMETHAMINE 30 MG/ML
15 INJECTION, SOLUTION INTRAMUSCULAR; INTRAVENOUS ONCE
Status: COMPLETED | OUTPATIENT
Start: 2021-09-30 | End: 2021-09-30

## 2021-09-30 RX ORDER — METOCLOPRAMIDE HYDROCHLORIDE 5 MG/ML
10 INJECTION INTRAMUSCULAR; INTRAVENOUS ONCE
Status: COMPLETED | OUTPATIENT
Start: 2021-09-30 | End: 2021-09-30

## 2021-09-30 RX ORDER — DIPHENHYDRAMINE HYDROCHLORIDE 50 MG/ML
25 INJECTION INTRAMUSCULAR; INTRAVENOUS ONCE
Status: COMPLETED | OUTPATIENT
Start: 2021-09-30 | End: 2021-09-30

## 2021-09-30 NOTE — ED INITIAL ASSESSMENT (HPI)
Patient at work, found having a seizure by coworker. 4-5 seizures lasting 15-30 seconds. Patient stated she was having aura still so medics gave her 10 mg versed IN given at [phone] due to unsuccessful IV access. After versed patient c/o headache no aura.

## 2021-09-30 NOTE — ED PROVIDER NOTES
Patient Seen in: [facility] Emergency Department      History   Patient presents with:  Seizure Disorder    Stated Complaint: Seizure    Subjective:   HPI  Patient is a 52-year-old female with a history of seizure disorder with a history of partial NEUROSTIMULATOR ELECTRODE      2 yrs ago   • OTHER      frontal lobectomy   • OTHER      facial surgery   • OTHER      shoulder replacement   • OTHER      cervical fusion   • OTHER      frontal and temporal lobectomies for seizures   • OTHER      bilat ezio Effort: Pulmonary effort is normal. No respiratory distress. Abdominal:      General: There is no distension. Palpations: Abdomen is soft. Tenderness: There is no abdominal tenderness. Musculoskeletal:         General: No tenderness.  Normal r All other components within normal limits   URINALYSIS WITH CULTURE REFLEX - Abnormal; Notable for the following components:    Leukocyte Esterase Urine Trace (*)     Squamous Epi.  Cells Few (*)     All other components within normal limits   CBC WITH DIFF =====    CONCLUSION:  There is extensive postoperative change noted with multiple     craniotomy defects and focal postoperative encephalomalacia lateral     anterior left temporal lobe.   Imaging appearance is unchanged since     previous study and there i pm    Follow-up:        Follow-up with your neurologist as soon as you can get an appointment  , Return to the ER if you feel worse in any way, Return to the ER if you have any concerns          Medications Prescribed:  Discharge Medication List as of 9/30

## 2021-10-21 ENCOUNTER — APPOINTMENT (OUTPATIENT)
Dept: CT IMAGING | Facility: HOSPITAL | Age: 46
End: 2021-10-21
Attending: EMERGENCY MEDICINE
Payer: MEDICARE

## 2021-10-21 ENCOUNTER — HOSPITAL ENCOUNTER (EMERGENCY)
Facility: HOSPITAL | Age: 46
Discharge: HOME | End: 2021-10-21
Attending: EMERGENCY MEDICINE
Payer: MEDICARE

## 2021-10-21 VITALS
HEIGHT: 67 IN | RESPIRATION RATE: 18 BRPM | BODY MASS INDEX: 32.33 KG/M2 | TEMPERATURE: 98 F | SYSTOLIC BLOOD PRESSURE: 102 MMHG | HEART RATE: 66 BPM | DIASTOLIC BLOOD PRESSURE: 61 MMHG | OXYGEN SATURATION: 98 % | WEIGHT: 206 LBS

## 2021-10-21 DIAGNOSIS — G40.909 SEIZURE DISORDER (HCC): Primary | ICD-10-CM

## 2021-10-21 DIAGNOSIS — G43.909 MIGRAINE WITHOUT STATUS MIGRAINOSUS, NOT INTRACTABLE, UNSPECIFIED MIGRAINE TYPE: ICD-10-CM

## 2021-10-21 PROCEDURE — 96374 THER/PROPH/DIAG INJ IV PUSH: CPT

## 2021-10-21 PROCEDURE — 99285 EMERGENCY DEPT VISIT HI MDM: CPT

## 2021-10-21 PROCEDURE — 93005 ELECTROCARDIOGRAM TRACING: CPT

## 2021-10-21 PROCEDURE — 80307 DRUG TEST PRSMV CHEM ANLYZR: CPT | Performed by: EMERGENCY MEDICINE

## 2021-10-21 PROCEDURE — 80053 COMPREHEN METABOLIC PANEL: CPT | Performed by: EMERGENCY MEDICINE

## 2021-10-21 PROCEDURE — 93010 ELECTROCARDIOGRAM REPORT: CPT

## 2021-10-21 PROCEDURE — 70450 CT HEAD/BRAIN W/O DYE: CPT | Performed by: EMERGENCY MEDICINE

## 2021-10-21 PROCEDURE — 85025 COMPLETE CBC W/AUTO DIFF WBC: CPT | Performed by: EMERGENCY MEDICINE

## 2021-10-21 PROCEDURE — 96375 TX/PRO/DX INJ NEW DRUG ADDON: CPT

## 2021-10-21 PROCEDURE — 81025 URINE PREGNANCY TEST: CPT

## 2021-10-21 RX ORDER — METOCLOPRAMIDE HYDROCHLORIDE 5 MG/ML
10 INJECTION INTRAMUSCULAR; INTRAVENOUS ONCE
Status: COMPLETED | OUTPATIENT
Start: 2021-10-21 | End: 2021-10-21

## 2021-10-21 RX ORDER — LORAZEPAM 2 MG/ML
1 INJECTION INTRAMUSCULAR ONCE
Status: COMPLETED | OUTPATIENT
Start: 2021-10-21 | End: 2021-10-21

## 2021-10-21 RX ORDER — DIPHENHYDRAMINE HYDROCHLORIDE 50 MG/ML
25 INJECTION INTRAMUSCULAR; INTRAVENOUS ONCE
Status: COMPLETED | OUTPATIENT
Start: 2021-10-21 | End: 2021-10-21

## 2021-10-21 RX ORDER — KETOROLAC TROMETHAMINE 30 MG/ML
30 INJECTION, SOLUTION INTRAMUSCULAR; INTRAVENOUS ONCE
Status: COMPLETED | OUTPATIENT
Start: 2021-10-21 | End: 2021-10-21

## 2021-10-21 NOTE — ED PROVIDER NOTES
Patient Seen in: [facility] Emergency Department      History   Patient presents with:  Seizure Disorder    Stated Complaint:     Subjective:   HPI    This is a 62-year-old female who has a long history of seizures. She is on multiple medications. systems reviewed and negative except as noted above.     Physical Exam     ED Triage Vitals [10/21/21 1548]   /63   Pulse 78   Resp 22   Temp    Temp src    SpO2 95 %   O2 Device None (Room air)       Current:BP 98/64   Pulse 62   Resp 20   Ht 170.2 c Narrative:     Results of the Urine Drug Screen should be used only for medical purposes. POCT PREGNANCY URINE - Normal   CBC WITH DIFFERENTIAL WITH PLATELET    Narrative:      The following orders were created for panel order CBC With Differential With P 10/21/2021  PROCEDURE:  CT BRAIN OR HEAD (81703)  COMPARISON:  [facility] , CT, CT BRAIN OR HEAD ([id number]), 1/09/2021, 4:09 PM.  [facility] , CT, CT BRAIN OR HEAD ([id number]), 9/30/2021, 3:49 PM.  INDICATIONS:  ha  TECHNIQUE:  Noncontrast CT scanning is performed throug is neurologically intact with no focal findings no neurological deficits I discussed with her I can call her neurologist who is not on staff but she stated that she will call them tomorrow and she is I discussed if she is going to go home I would take an e

## 2021-10-21 NOTE — ED INITIAL ASSESSMENT (HPI)
Pt to ED for evaluation of seizure at work. Per EMS when they arrived to the scene pt had seized for 3-4min. Pt reports seizure yesterday and more frequently. Pt states when her migraines gets bad she gets seizures.

## 2021-11-22 ENCOUNTER — LAB SERVICES (OUTPATIENT)
Dept: LAB | Age: 46
End: 2021-11-22

## 2021-11-22 DIAGNOSIS — Z11.52 ENCOUNTER FOR PREPROCEDURE SCREENING LABORATORY TESTING FOR COVID-19: ICD-10-CM

## 2021-11-22 DIAGNOSIS — Z01.812 ENCOUNTER FOR PREPROCEDURE SCREENING LABORATORY TESTING FOR COVID-19: ICD-10-CM

## 2021-11-22 LAB
SARS-COV-2 RNA RESP QL NAA+PROBE: NOT DETECTED
SERVICE CMNT-IMP: NORMAL
SERVICE CMNT-IMP: NORMAL

## 2021-11-22 PROCEDURE — U0005 INFEC AGEN DETEC AMPLI PROBE: HCPCS | Performed by: CLINICAL MEDICAL LABORATORY

## 2021-11-22 PROCEDURE — U0003 INFECTIOUS AGENT DETECTION BY NUCLEIC ACID (DNA OR RNA); SEVERE ACUTE RESPIRATORY SYNDROME CORONAVIRUS 2 (SARS-COV-2) (CORONAVIRUS DISEASE [COVID-19]), AMPLIFIED PROBE TECHNIQUE, MAKING USE OF HIGH THROUGHPUT TECHNOLOGIES AS DESCRIBED BY CMS-2020-01-R: HCPCS | Performed by: CLINICAL MEDICAL LABORATORY

## 2021-11-23 RX ORDER — LAMOTRIGINE 200 MG/1
200 TABLET ORAL 2 TIMES DAILY
COMMUNITY
Start: 2021-10-30

## 2021-11-23 RX ORDER — ZOLPIDEM TARTRATE 12.5 MG/1
12.5 TABLET, FILM COATED, EXTENDED RELEASE ORAL NIGHTLY PRN
COMMUNITY
Start: 2021-11-19

## 2021-11-23 RX ORDER — SUMATRIPTAN 100 MG/1
100 TABLET, FILM COATED ORAL PRN
COMMUNITY
Start: 2021-11-16

## 2021-11-23 RX ORDER — ALPRAZOLAM 1 MG/1
1 TABLET ORAL AT BEDTIME
COMMUNITY
Start: 2021-11-19

## 2021-11-23 RX ORDER — BRIVARACETAM 100 MG/1
100 TABLET, FILM COATED ORAL 2 TIMES DAILY
COMMUNITY
Start: 2021-10-30

## 2021-11-23 RX ORDER — SERTRALINE HYDROCHLORIDE 25 MG/1
25 TABLET, FILM COATED ORAL AT BEDTIME
COMMUNITY
Start: 2021-11-19

## 2021-11-23 RX ORDER — CARIPRAZINE 3 MG/1
3 CAPSULE, GELATIN COATED ORAL DAILY
COMMUNITY
Start: 2021-11-19

## 2021-11-24 ENCOUNTER — APPOINTMENT (OUTPATIENT)
Dept: GENERAL RADIOLOGY | Age: 46
End: 2021-11-24
Attending: STUDENT IN AN ORGANIZED HEALTH CARE EDUCATION/TRAINING PROGRAM

## 2021-11-24 ENCOUNTER — HOSPITAL ENCOUNTER (OUTPATIENT)
Age: 46
Discharge: HOME | End: 2021-11-24
Attending: OTOLARYNGOLOGY | Admitting: OTOLARYNGOLOGY

## 2021-11-24 ENCOUNTER — ANESTHESIA (OUTPATIENT)
Dept: SURGERY | Age: 46
End: 2021-11-24

## 2021-11-24 ENCOUNTER — ANESTHESIA EVENT (OUTPATIENT)
Dept: SURGERY | Age: 46
End: 2021-11-24

## 2021-11-24 VITALS
TEMPERATURE: 98.4 F | OXYGEN SATURATION: 97 % | DIASTOLIC BLOOD PRESSURE: 82 MMHG | SYSTOLIC BLOOD PRESSURE: 127 MMHG | HEART RATE: 98 BPM | BODY MASS INDEX: 32.18 KG/M2 | HEIGHT: 67 IN | WEIGHT: 205 LBS | RESPIRATION RATE: 18 BRPM

## 2021-11-24 DIAGNOSIS — G40.219 LOCALIZATION-RELATED EPILEPSY WITH COMPLEX PARTIAL SEIZURES WITH INTRACTABLE EPILEPSY (CMD): ICD-10-CM

## 2021-11-24 LAB
B-HCG UR QL: NEGATIVE
INTERNAL PROCEDURAL CONTROLS ACCEPTABLE: YES

## 2021-11-24 PROCEDURE — 13000109 HB NEURO BASIC CASE EA ADD MINUTE: Performed by: OTOLARYNGOLOGY

## 2021-11-24 PROCEDURE — 13000001 HB PHASE II RECOVERY EA 30 MINUTES: Performed by: OTOLARYNGOLOGY

## 2021-11-24 PROCEDURE — 10006027 HB SUPPLY 278: Performed by: OTOLARYNGOLOGY

## 2021-11-24 PROCEDURE — 81025 URINE PREGNANCY TEST: CPT | Performed by: OTOLARYNGOLOGY

## 2021-11-24 PROCEDURE — 13000003 HB ANESTHESIA  GENERAL EA ADD MINUTE: Performed by: OTOLARYNGOLOGY

## 2021-11-24 PROCEDURE — 10002801 HB RX 250 W/O HCPCS: Performed by: OTOLARYNGOLOGY

## 2021-11-24 PROCEDURE — 10004452 HB PACU ADDL 30 MINUTES: Performed by: OTOLARYNGOLOGY

## 2021-11-24 PROCEDURE — 70360 X-RAY EXAM OF NECK: CPT

## 2021-11-24 PROCEDURE — 10002800 HB RX 250 W HCPCS

## 2021-11-24 PROCEDURE — 10002807 HB RX 258

## 2021-11-24 PROCEDURE — 10002801 HB RX 250 W/O HCPCS

## 2021-11-24 PROCEDURE — 13000002 HB ANESTHESIA  GENERAL  S/U + 1ST 15 MIN: Performed by: OTOLARYNGOLOGY

## 2021-11-24 PROCEDURE — 13000108 HB NEURO BASIC CASE S/U + 1ST 15 MIN: Performed by: OTOLARYNGOLOGY

## 2021-11-24 PROCEDURE — 10004451 HB PACU RECOVERY 1ST 30 MINUTES: Performed by: OTOLARYNGOLOGY

## 2021-11-24 PROCEDURE — 10006023 HB SUPPLY 272: Performed by: OTOLARYNGOLOGY

## 2021-11-24 PROCEDURE — 10002807 HB RX 258: Performed by: OTOLARYNGOLOGY

## 2021-11-24 PROCEDURE — C1778 LEAD, NEUROSTIMULATOR: HCPCS | Performed by: OTOLARYNGOLOGY

## 2021-11-24 PROCEDURE — 71045 X-RAY EXAM CHEST 1 VIEW: CPT

## 2021-11-24 PROCEDURE — 88304 TISSUE EXAM BY PATHOLOGIST: CPT | Performed by: OTOLARYNGOLOGY

## 2021-11-24 DEVICE — FLOSEAL HEMOSTATIC MATRIX, 10ML
Type: IMPLANTABLE DEVICE | Site: NECK | Status: FUNCTIONAL
Brand: FLOSEAL HEMOSTATIC MATRIX

## 2021-11-24 DEVICE — IMPLANTABLE LEAD
Type: IMPLANTABLE DEVICE | Site: NECK | Status: FUNCTIONAL
Brand: VNS THERAPY® PERENNIAFLEX® MODEL 304 LEAD

## 2021-11-24 RX ORDER — HYDRALAZINE HYDROCHLORIDE 20 MG/ML
5 INJECTION INTRAMUSCULAR; INTRAVENOUS EVERY 10 MIN PRN
Status: DISCONTINUED | OUTPATIENT
Start: 2021-11-24 | End: 2021-11-24 | Stop reason: HOSPADM

## 2021-11-24 RX ORDER — PROPOFOL 10 MG/ML
INJECTION, EMULSION INTRAVENOUS PRN
Status: DISCONTINUED | OUTPATIENT
Start: 2021-11-24 | End: 2021-11-24 | Stop reason: HOSPADM

## 2021-11-24 RX ORDER — SODIUM CHLORIDE, SODIUM LACTATE, POTASSIUM CHLORIDE, CALCIUM CHLORIDE 600; 310; 30; 20 MG/100ML; MG/100ML; MG/100ML; MG/100ML
INJECTION, SOLUTION INTRAVENOUS CONTINUOUS
Status: DISCONTINUED | OUTPATIENT
Start: 2021-11-24 | End: 2021-11-24 | Stop reason: HOSPADM

## 2021-11-24 RX ORDER — ONDANSETRON 2 MG/ML
4 INJECTION INTRAMUSCULAR; INTRAVENOUS 2 TIMES DAILY PRN
Status: DISCONTINUED | OUTPATIENT
Start: 2021-11-24 | End: 2021-11-24 | Stop reason: HOSPADM

## 2021-11-24 RX ORDER — NALBUPHINE HYDROCHLORIDE 10 MG/ML
2.5 INJECTION, SOLUTION INTRAMUSCULAR; INTRAVENOUS; SUBCUTANEOUS
Status: DISCONTINUED | OUTPATIENT
Start: 2021-11-24 | End: 2021-11-24 | Stop reason: HOSPADM

## 2021-11-24 RX ORDER — MAGNESIUM HYDROXIDE 1200 MG/15ML
LIQUID ORAL PRN
Status: DISCONTINUED | OUTPATIENT
Start: 2021-11-24 | End: 2021-11-24 | Stop reason: HOSPADM

## 2021-11-24 RX ORDER — LIDOCAINE HYDROCHLORIDE AND EPINEPHRINE 10; 10 MG/ML; UG/ML
INJECTION, SOLUTION INFILTRATION; PERINEURAL PRN
Status: DISCONTINUED | OUTPATIENT
Start: 2021-11-24 | End: 2021-11-24 | Stop reason: HOSPADM

## 2021-11-24 RX ORDER — KETOROLAC TROMETHAMINE 30 MG/ML
INJECTION, SOLUTION INTRAMUSCULAR; INTRAVENOUS
Status: DISCONTINUED
Start: 2021-11-24 | End: 2021-11-24 | Stop reason: HOSPADM

## 2021-11-24 RX ORDER — NALOXONE HCL 0.4 MG/ML
0.2 VIAL (ML) INJECTION EVERY 5 MIN PRN
Status: DISCONTINUED | OUTPATIENT
Start: 2021-11-24 | End: 2021-11-24 | Stop reason: HOSPADM

## 2021-11-24 RX ORDER — EPHEDRINE SULFATE/0.9% NACL/PF 50 MG/10ML
5 SYRINGE (ML) INTRAVENOUS
Status: DISCONTINUED | OUTPATIENT
Start: 2021-11-24 | End: 2021-11-24 | Stop reason: HOSPADM

## 2021-11-24 RX ORDER — SODIUM CHLORIDE, SODIUM LACTATE, POTASSIUM CHLORIDE, CALCIUM CHLORIDE 600; 310; 30; 20 MG/100ML; MG/100ML; MG/100ML; MG/100ML
INJECTION, SOLUTION INTRAVENOUS CONTINUOUS PRN
Status: DISCONTINUED | OUTPATIENT
Start: 2021-11-24 | End: 2021-11-24 | Stop reason: HOSPADM

## 2021-11-24 RX ORDER — ALBUTEROL SULFATE 2.5 MG/3ML
5 SOLUTION RESPIRATORY (INHALATION) ONCE
Status: DISCONTINUED | OUTPATIENT
Start: 2021-11-24 | End: 2021-11-24 | Stop reason: HOSPADM

## 2021-11-24 RX ORDER — ONDANSETRON 2 MG/ML
INJECTION INTRAMUSCULAR; INTRAVENOUS PRN
Status: DISCONTINUED | OUTPATIENT
Start: 2021-11-24 | End: 2021-11-24 | Stop reason: HOSPADM

## 2021-11-24 RX ORDER — MIDAZOLAM HYDROCHLORIDE 1 MG/ML
INJECTION, SOLUTION INTRAMUSCULAR; INTRAVENOUS PRN
Status: DISCONTINUED | OUTPATIENT
Start: 2021-11-24 | End: 2021-11-24 | Stop reason: HOSPADM

## 2021-11-24 RX ORDER — ENALAPRILAT 1.25 MG/ML
1.25 INJECTION INTRAVENOUS PRN
Status: DISCONTINUED | OUTPATIENT
Start: 2021-11-24 | End: 2021-11-24 | Stop reason: HOSPADM

## 2021-11-24 RX ORDER — LIDOCAINE HYDROCHLORIDE 10 MG/ML
INJECTION, SOLUTION INFILTRATION; PERINEURAL PRN
Status: DISCONTINUED | OUTPATIENT
Start: 2021-11-24 | End: 2021-11-24 | Stop reason: HOSPADM

## 2021-11-24 RX ORDER — DIPHENHYDRAMINE HYDROCHLORIDE 50 MG/ML
12.5 INJECTION INTRAMUSCULAR; INTRAVENOUS EVERY 4 HOURS PRN
Status: DISCONTINUED | OUTPATIENT
Start: 2021-11-24 | End: 2021-11-24 | Stop reason: HOSPADM

## 2021-11-24 RX ORDER — DIPHENHYDRAMINE HYDROCHLORIDE 50 MG/ML
25 INJECTION INTRAMUSCULAR; INTRAVENOUS
Status: DISCONTINUED | OUTPATIENT
Start: 2021-11-24 | End: 2021-11-24 | Stop reason: HOSPADM

## 2021-11-24 RX ORDER — DEXAMETHASONE SODIUM PHOSPHATE 10 MG/ML
INJECTION, SOLUTION INTRAMUSCULAR; INTRAVENOUS PRN
Status: DISCONTINUED | OUTPATIENT
Start: 2021-11-24 | End: 2021-11-24 | Stop reason: HOSPADM

## 2021-11-24 RX ADMIN — HYDROMORPHONE HYDROCHLORIDE 0.2 MG: 1 INJECTION, SOLUTION INTRAMUSCULAR; INTRAVENOUS; SUBCUTANEOUS at 18:55

## 2021-11-24 RX ADMIN — LIDOCAINE HYDROCHLORIDE 2 ML: 10 INJECTION, SOLUTION INFILTRATION; PERINEURAL at 15:32

## 2021-11-24 RX ADMIN — FENTANYL CITRATE 100 MCG: 50 INJECTION, SOLUTION INTRAMUSCULAR; INTRAVENOUS at 15:29

## 2021-11-24 RX ADMIN — FENTANYL CITRATE 25 MCG: 50 INJECTION, SOLUTION INTRAMUSCULAR; INTRAVENOUS at 17:26

## 2021-11-24 RX ADMIN — DEXAMETHASONE SODIUM PHOSPHATE 10 MG: 10 INJECTION, SOLUTION INTRAMUSCULAR; INTRAVENOUS at 15:47

## 2021-11-24 RX ADMIN — FENTANYL CITRATE 25 MCG: 50 INJECTION, SOLUTION INTRAMUSCULAR; INTRAVENOUS at 17:09

## 2021-11-24 RX ADMIN — FENTANYL CITRATE 25 MCG: 50 INJECTION, SOLUTION INTRAMUSCULAR; INTRAVENOUS at 17:24

## 2021-11-24 RX ADMIN — FENTANYL CITRATE 25 MCG: 50 INJECTION, SOLUTION INTRAMUSCULAR; INTRAVENOUS at 18:06

## 2021-11-24 RX ADMIN — HYDROMORPHONE HYDROCHLORIDE 0.2 MG: 1 INJECTION, SOLUTION INTRAMUSCULAR; INTRAVENOUS; SUBCUTANEOUS at 18:31

## 2021-11-24 RX ADMIN — MIDAZOLAM HYDROCHLORIDE 2 MG: 1 INJECTION, SOLUTION INTRAMUSCULAR; INTRAVENOUS at 15:28

## 2021-11-24 RX ADMIN — ONDANSETRON 4 MG: 2 INJECTION INTRAMUSCULAR; INTRAVENOUS at 15:47

## 2021-11-24 RX ADMIN — FENTANYL CITRATE 100 MCG: 50 INJECTION, SOLUTION INTRAMUSCULAR; INTRAVENOUS at 16:17

## 2021-11-24 RX ADMIN — SODIUM CHLORIDE, POTASSIUM CHLORIDE, SODIUM LACTATE AND CALCIUM CHLORIDE: 600; 310; 30; 20 INJECTION, SOLUTION INTRAVENOUS at 15:27

## 2021-11-24 RX ADMIN — PROPOFOL 160 MG: 10 INJECTION, EMULSION INTRAVENOUS at 15:32

## 2021-11-24 RX ADMIN — FENTANYL CITRATE 25 MCG: 50 INJECTION INTRAMUSCULAR; INTRAVENOUS at 18:13

## 2021-11-24 RX ADMIN — SODIUM CHLORIDE, SODIUM LACTATE, POTASSIUM CHLORIDE, AND CALCIUM CHLORIDE 1000 ML: .6; .31; .03; .02 INJECTION, SOLUTION INTRAVENOUS at 12:56

## 2021-11-24 ASSESSMENT — PAIN SCALES - GENERAL
PAINLEVEL_OUTOF10: 7
PAINLEVEL_OUTOF10: 5
PAINLEVEL_OUTOF10: 7
PAINLEVEL_OUTOF10: 8
PAINLEVEL_OUTOF10: 4
PAINLEVEL_OUTOF10: 7
PAINLEVEL_OUTOF10: 3
PAINLEVEL_OUTOF10: 0
PAINLEVEL_OUTOF10: 3
PAINLEVEL_OUTOF10: 4

## 2021-11-24 ASSESSMENT — ACTIVITIES OF DAILY LIVING (ADL)
SENSORY_SUPPORT_DEVICES: EYEGLASSES
ADL_BEFORE_ADMISSION: INDEPENDENT
HISTORY OF FALLING IN THE LAST YEAR (PRIOR TO ADMISSION): NO
ADL_SHORT_OF_BREATH: NO
ADL_SCORE: 12
NEEDS_ASSIST: NO

## 2021-11-24 ASSESSMENT — ENCOUNTER SYMPTOMS
SEIZURES: 1
HEADACHES: 1

## 2021-11-24 ASSESSMENT — COGNITIVE AND FUNCTIONAL STATUS - GENERAL
ARE YOU DEAF OR DO YOU HAVE SERIOUS DIFFICULTY  HEARING: NO
ARE YOU BLIND OR DO YOU HAVE SERIOUS DIFFICULTY SEEING, EVEN WHEN WEARING GLASSES: NO

## 2021-11-29 LAB
ASR DISCLAIMER: NORMAL
CASE RPRT: NORMAL
CLINICAL INFO: NORMAL
PATH REPORT.FINAL DX SPEC: NORMAL
PATH REPORT.GROSS SPEC: NORMAL

## 2021-12-09 ENCOUNTER — APPOINTMENT (OUTPATIENT)
Dept: CT IMAGING | Facility: HOSPITAL | Age: 46
End: 2021-12-09
Attending: EMERGENCY MEDICINE
Payer: MEDICARE

## 2021-12-09 ENCOUNTER — HOSPITAL ENCOUNTER (EMERGENCY)
Facility: HOSPITAL | Age: 46
Discharge: HOME | End: 2021-12-09
Attending: EMERGENCY MEDICINE
Payer: MEDICARE

## 2021-12-09 VITALS
OXYGEN SATURATION: 97 % | DIASTOLIC BLOOD PRESSURE: 74 MMHG | HEART RATE: 88 BPM | SYSTOLIC BLOOD PRESSURE: 137 MMHG | TEMPERATURE: 98 F | HEIGHT: 67 IN | RESPIRATION RATE: 18 BRPM | WEIGHT: 209 LBS | BODY MASS INDEX: 32.8 KG/M2

## 2021-12-09 DIAGNOSIS — G40.909 RECURRENT SEIZURES (HCC): Primary | ICD-10-CM

## 2021-12-09 DIAGNOSIS — N39.0 URINARY TRACT INFECTION WITHOUT HEMATURIA, SITE UNSPECIFIED: ICD-10-CM

## 2021-12-09 PROCEDURE — 99284 EMERGENCY DEPT VISIT MOD MDM: CPT

## 2021-12-09 PROCEDURE — 70450 CT HEAD/BRAIN W/O DYE: CPT | Performed by: EMERGENCY MEDICINE

## 2021-12-09 PROCEDURE — 85025 COMPLETE CBC W/AUTO DIFF WBC: CPT | Performed by: EMERGENCY MEDICINE

## 2021-12-09 PROCEDURE — 96375 TX/PRO/DX INJ NEW DRUG ADDON: CPT

## 2021-12-09 PROCEDURE — 81001 URINALYSIS AUTO W/SCOPE: CPT | Performed by: EMERGENCY MEDICINE

## 2021-12-09 PROCEDURE — 96374 THER/PROPH/DIAG INJ IV PUSH: CPT

## 2021-12-09 PROCEDURE — 87086 URINE CULTURE/COLONY COUNT: CPT | Performed by: EMERGENCY MEDICINE

## 2021-12-09 PROCEDURE — 80053 COMPREHEN METABOLIC PANEL: CPT | Performed by: EMERGENCY MEDICINE

## 2021-12-09 PROCEDURE — 81025 URINE PREGNANCY TEST: CPT

## 2021-12-09 RX ORDER — DIPHENHYDRAMINE HYDROCHLORIDE 50 MG/ML
25 INJECTION INTRAMUSCULAR; INTRAVENOUS ONCE
Status: COMPLETED | OUTPATIENT
Start: 2021-12-09 | End: 2021-12-09

## 2021-12-09 RX ORDER — METOCLOPRAMIDE HYDROCHLORIDE 5 MG/ML
10 INJECTION INTRAMUSCULAR; INTRAVENOUS ONCE
Status: COMPLETED | OUTPATIENT
Start: 2021-12-09 | End: 2021-12-09

## 2021-12-09 RX ORDER — KETOROLAC TROMETHAMINE 30 MG/ML
15 INJECTION, SOLUTION INTRAMUSCULAR; INTRAVENOUS ONCE
Status: COMPLETED | OUTPATIENT
Start: 2021-12-09 | End: 2021-12-09

## 2021-12-09 RX ORDER — NITROFURANTOIN 25; 75 MG/1; MG/1
100 CAPSULE ORAL 2 TIMES DAILY
Qty: 20 CAPSULE | Refills: 0 | Status: SHIPPED | OUTPATIENT
Start: 2021-12-09 | End: 2021-12-19

## 2021-12-09 NOTE — ED INITIAL ASSESSMENT (HPI)
Pt presents via ems. Per ems, pt had a 2-3 minute seizure today at work. Intra-nasal versed was given. Per patient, she gets seizures every 2-3 weeks. She had a vagal nerve stimulator placed last week but it is not activated yet.

## 2021-12-09 NOTE — ED PROVIDER NOTES
Patient Seen in: [facility] Emergency Department      History   Patient presents with:  Seizure Disorder    Stated Complaint: seizure     Subjective:   HPI    Patient is a 78-year-old woman with a longstanding history of seizures related to traumati ESOPHAGOGASTRODUODENOSCOPY w/ bxs, COLONOSCOPY w/ bxs;  Surgeon: Agnes Mojica MD;  Location: [address] [place] [address]   • NEUROSTIMULATOR ELECTRODE      2 yrs ago   • OTHER      frontal lobectomy   • OTHER      facial surgery   • OTHER      shoulder rep Conjunctivae normal.   Cardiovascular:      Rate and Rhythm: Normal rate. Pulmonary:      Effort: Pulmonary effort is normal. No respiratory distress. Abdominal:      General: There is no distension. Palpations: Abdomen is soft. Tenderness:  Cindy Trumbull MDM         Labs Reviewed   COMP METABOLIC PANEL (14) - Abnormal; Notable for the following components:       Result Value    Glucose 116 (*)     BUN 23 (*)     Creatinine 1.07 (*)     All other components within normal limits   URINALYSIS WITH CU There have been bilateral craniotomies. There is no     evidence of acute ischemia, hemorrhage or mass. SINUSES:           No sign of acute sinusitis. MASTOIDS:          No sign of acute inflammation.     SKULL:             There is anterior sublu Oral Cap  Take 1 capsule (100 mg total) by mouth 2 (two) times daily for 10 days.   Qty: 20 capsule Refills: 0

## 2022-01-02 ENCOUNTER — APPOINTMENT (OUTPATIENT)
Dept: CT IMAGING | Facility: HOSPITAL | Age: 47
End: 2022-01-02
Attending: EMERGENCY MEDICINE
Payer: MEDICARE

## 2022-01-02 ENCOUNTER — HOSPITAL ENCOUNTER (EMERGENCY)
Facility: HOSPITAL | Age: 47
Discharge: HOME | End: 2022-01-02
Attending: EMERGENCY MEDICINE
Payer: MEDICARE

## 2022-01-02 VITALS
TEMPERATURE: 98 F | SYSTOLIC BLOOD PRESSURE: 121 MMHG | HEART RATE: 79 BPM | OXYGEN SATURATION: 100 % | RESPIRATION RATE: 16 BRPM | DIASTOLIC BLOOD PRESSURE: 62 MMHG

## 2022-01-02 DIAGNOSIS — G40.909 SEIZURE DISORDER (HCC): Primary | ICD-10-CM

## 2022-01-02 LAB
ALBUMIN SERPL-MCNC: 3.4 G/DL (ref 3.4–5)
ALBUMIN/GLOB SERPL: 1.1 {RATIO} (ref 1–2)
ALP LIVER SERPL-CCNC: 49 U/L
ALT SERPL-CCNC: 23 U/L
ANION GAP SERPL CALC-SCNC: 7 MMOL/L (ref 0–18)
AST SERPL-CCNC: 14 U/L (ref 15–37)
BASOPHILS # BLD AUTO: 0.02 X10(3) UL (ref 0–0.2)
BASOPHILS NFR BLD AUTO: 0.4 %
BILIRUB SERPL-MCNC: 0.2 MG/DL (ref 0.1–2)
BUN BLD-MCNC: 18 MG/DL (ref 7–18)
CALCIUM BLD-MCNC: 8.2 MG/DL (ref 8.5–10.1)
CHLORIDE SERPL-SCNC: 113 MMOL/L (ref 98–112)
CO2 SERPL-SCNC: 22 MMOL/L (ref 21–32)
CREAT BLD-MCNC: 1.04 MG/DL
EOSINOPHIL # BLD AUTO: 0.05 X10(3) UL (ref 0–0.7)
EOSINOPHIL NFR BLD AUTO: 0.9 %
ERYTHROCYTE [DISTWIDTH] IN BLOOD BY AUTOMATED COUNT: 13.3 %
ETHANOL SERPL-MCNC: <3 MG/DL (ref ?–3)
GLOBULIN PLAS-MCNC: 3.2 G/DL (ref 2.8–4.4)
GLUCOSE BLD-MCNC: 120 MG/DL (ref 70–99)
GLUCOSE BLD-MCNC: 136 MG/DL (ref 70–99)
HCT VFR BLD AUTO: 39 %
HGB BLD-MCNC: 12.4 G/DL
IMM GRANULOCYTES # BLD AUTO: 0.01 X10(3) UL (ref 0–1)
IMM GRANULOCYTES NFR BLD: 0.2 %
LYMPHOCYTES # BLD AUTO: 1.35 X10(3) UL (ref 1–4)
LYMPHOCYTES NFR BLD AUTO: 24.9 %
MCH RBC QN AUTO: 30.2 PG (ref 26–34)
MCHC RBC AUTO-ENTMCNC: 31.8 G/DL (ref 31–37)
MCV RBC AUTO: 95.1 FL
MONOCYTES # BLD AUTO: 0.33 X10(3) UL (ref 0.1–1)
MONOCYTES NFR BLD AUTO: 6.1 %
NEUTROPHILS # BLD AUTO: 3.66 X10 (3) UL (ref 1.5–7.7)
NEUTROPHILS # BLD AUTO: 3.66 X10(3) UL (ref 1.5–7.7)
NEUTROPHILS NFR BLD AUTO: 67.5 %
OSMOLALITY SERPL CALC.SUM OF ELEC: 297 MOSM/KG (ref 275–295)
PLATELET # BLD AUTO: 215 10(3)UL (ref 150–450)
POTASSIUM SERPL-SCNC: 3.4 MMOL/L (ref 3.5–5.1)
PROT SERPL-MCNC: 6.6 G/DL (ref 6.4–8.2)
RBC # BLD AUTO: 4.1 X10(6)UL
SODIUM SERPL-SCNC: 142 MMOL/L (ref 136–145)
TROPONIN I HIGH SENSITIVITY: 3 NG/L
WBC # BLD AUTO: 5.4 X10(3) UL (ref 4–11)

## 2022-01-02 PROCEDURE — 93005 ELECTROCARDIOGRAM TRACING: CPT

## 2022-01-02 PROCEDURE — 96361 HYDRATE IV INFUSION ADD-ON: CPT

## 2022-01-02 PROCEDURE — 84484 ASSAY OF TROPONIN QUANT: CPT | Performed by: EMERGENCY MEDICINE

## 2022-01-02 PROCEDURE — 96375 TX/PRO/DX INJ NEW DRUG ADDON: CPT

## 2022-01-02 PROCEDURE — 99285 EMERGENCY DEPT VISIT HI MDM: CPT

## 2022-01-02 PROCEDURE — 85025 COMPLETE CBC W/AUTO DIFF WBC: CPT | Performed by: EMERGENCY MEDICINE

## 2022-01-02 PROCEDURE — 93010 ELECTROCARDIOGRAM REPORT: CPT

## 2022-01-02 PROCEDURE — 82077 ASSAY SPEC XCP UR&BREATH IA: CPT | Performed by: EMERGENCY MEDICINE

## 2022-01-02 PROCEDURE — 82962 GLUCOSE BLOOD TEST: CPT

## 2022-01-02 PROCEDURE — 96374 THER/PROPH/DIAG INJ IV PUSH: CPT

## 2022-01-02 PROCEDURE — 99284 EMERGENCY DEPT VISIT MOD MDM: CPT

## 2022-01-02 PROCEDURE — 70450 CT HEAD/BRAIN W/O DYE: CPT | Performed by: EMERGENCY MEDICINE

## 2022-01-02 PROCEDURE — 80053 COMPREHEN METABOLIC PANEL: CPT | Performed by: EMERGENCY MEDICINE

## 2022-01-02 RX ORDER — POTASSIUM CHLORIDE 20 MEQ/1
20 TABLET, EXTENDED RELEASE ORAL ONCE
Status: COMPLETED | OUTPATIENT
Start: 2022-01-02 | End: 2022-01-02

## 2022-01-02 RX ORDER — METOCLOPRAMIDE HYDROCHLORIDE 5 MG/ML
10 INJECTION INTRAMUSCULAR; INTRAVENOUS ONCE
Status: COMPLETED | OUTPATIENT
Start: 2022-01-02 | End: 2022-01-02

## 2022-01-02 RX ORDER — DIPHENHYDRAMINE HYDROCHLORIDE 50 MG/ML
25 INJECTION INTRAMUSCULAR; INTRAVENOUS ONCE
Status: COMPLETED | OUTPATIENT
Start: 2022-01-02 | End: 2022-01-02

## 2022-01-02 NOTE — ED PROVIDER NOTES
Patient Seen in: [facility] Emergency Department      History   Patient presents with:  Seizure Disorder    Stated Complaint: seizure    Subjective:   HPI    This is a 21-year-old female who arrives here with complaints of a seizure activity.   The p lobectomies for seizures   • OTHER      bilat shoulder surgery   • OTHER      Neurostimulator history. This was removed but wires are still present.   Unable to get brain MRI   • OTHER SURGICAL HISTORY      cervical fusion   • OTHER SURGICAL HISTORY      s edema    NEURO: Alert and oriented x3. Muscle strength and sensory exam is grossly normal.  And the patient is neurologically intact with no focal findings.          ED Course     Labs Reviewed   COMP METABOLIC PANEL (14) - Abnormal; Notable for the follow brain. Dose reduction techniques were used. Dose information is transmitted to the [facility] [employer] of Radiology) NRDR ([address]) which includes the Dose Index Registry.   PATIENT STATED HISTORY: (As transcribed by Technologist) hemorrhage or mass. SINUSES:           No sign of acute sinusitis. MASTOIDS:          No sign of acute inflammation. SKULL:             There is anterior subluxation of the left mandibular condyle relative to the temporomandibular joint.             Raúl Baugh Magali Amaro [phone]  Call  choose option 1 for general neurology    Fernando Larson MD  [address] [place] [address] [address]                Medications Prescribed:  Cur

## 2022-01-02 NOTE — ED INITIAL ASSESSMENT (HPI)
Patient with known seizure disorder, to ED with increased seizure activity. Medics administered 7mg versed en route. Patient had stimulator placed 3 weeks ago, states it is not working well today.  Patient lowered self to ground prior to first seizure, stat

## 2022-01-03 LAB
ATRIAL RATE: 82 BPM
P AXIS: 52 DEGREES
P-R INTERVAL: 150 MS
Q-T INTERVAL: 342 MS
QRS DURATION: 90 MS
QTC CALCULATION (BEZET): 399 MS
R AXIS: 7 DEGREES
T AXIS: 53 DEGREES
VENTRICULAR RATE: 82 BPM

## 2022-01-03 NOTE — ED QUICK NOTES
Patient returned from CT, states she is feeling much better. Ambulating to washroom with steady gait.

## 2022-02-12 ENCOUNTER — HOSPITAL ENCOUNTER (EMERGENCY)
Facility: HOSPITAL | Age: 47
Discharge: HOME | End: 2022-02-12
Attending: EMERGENCY MEDICINE
Payer: MEDICARE

## 2022-02-12 VITALS
TEMPERATURE: 98 F | DIASTOLIC BLOOD PRESSURE: 73 MMHG | HEART RATE: 79 BPM | RESPIRATION RATE: 18 BRPM | SYSTOLIC BLOOD PRESSURE: 121 MMHG | OXYGEN SATURATION: 100 % | BODY MASS INDEX: 32.96 KG/M2 | HEIGHT: 67 IN | WEIGHT: 210 LBS

## 2022-02-12 DIAGNOSIS — R56.9 SEIZURE (HCC): Primary | ICD-10-CM

## 2022-02-12 DIAGNOSIS — G40.909 SEIZURE DISORDER (HCC): ICD-10-CM

## 2022-02-12 LAB
ALBUMIN SERPL-MCNC: 3.6 G/DL (ref 3.4–5)
ALBUMIN/GLOB SERPL: 1.1 {RATIO} (ref 1–2)
ALP LIVER SERPL-CCNC: 51 U/L
ALT SERPL-CCNC: 19 U/L
ANION GAP SERPL CALC-SCNC: 5 MMOL/L (ref 0–18)
AST SERPL-CCNC: 21 U/L (ref 15–37)
BASOPHILS # BLD AUTO: 0.01 X10(3) UL (ref 0–0.2)
BASOPHILS NFR BLD AUTO: 0.2 %
BILIRUB SERPL-MCNC: 0.3 MG/DL (ref 0.1–2)
BILIRUB UR QL STRIP.AUTO: NEGATIVE
BUN BLD-MCNC: 17 MG/DL (ref 7–18)
CALCIUM BLD-MCNC: 8.6 MG/DL (ref 8.5–10.1)
CHLORIDE SERPL-SCNC: 111 MMOL/L (ref 98–112)
CLARITY UR REFRACT.AUTO: CLEAR
CO2 SERPL-SCNC: 23 MMOL/L (ref 21–32)
EOSINOPHIL # BLD AUTO: 0.08 X10(3) UL (ref 0–0.7)
EOSINOPHIL NFR BLD AUTO: 1.3 %
ERYTHROCYTE [DISTWIDTH] IN BLOOD BY AUTOMATED COUNT: 12.9 %
GLOBULIN PLAS-MCNC: 3.3 G/DL (ref 2.8–4.4)
GLUCOSE BLD-MCNC: 100 MG/DL (ref 70–99)
GLUCOSE BLD-MCNC: 115 MG/DL (ref 70–99)
GLUCOSE UR STRIP.AUTO-MCNC: NEGATIVE MG/DL
HCT VFR BLD AUTO: 40.1 %
HGB BLD-MCNC: 12.6 G/DL
IMM GRANULOCYTES # BLD AUTO: 0.03 X10(3) UL (ref 0–1)
IMM GRANULOCYTES NFR BLD: 0.5 %
KETONES UR STRIP.AUTO-MCNC: NEGATIVE MG/DL
LYMPHOCYTES # BLD AUTO: 1.71 X10(3) UL (ref 1–4)
LYMPHOCYTES NFR BLD AUTO: 28.3 %
MCH RBC QN AUTO: 29.7 PG (ref 26–34)
MCHC RBC AUTO-ENTMCNC: 31.4 G/DL (ref 31–37)
MCV RBC AUTO: 94.6 FL
MONOCYTES # BLD AUTO: 0.35 X10(3) UL (ref 0.1–1)
MONOCYTES NFR BLD AUTO: 5.8 %
NEUTROPHILS # BLD AUTO: 3.87 X10 (3) UL (ref 1.5–7.7)
NEUTROPHILS # BLD AUTO: 3.87 X10(3) UL (ref 1.5–7.7)
NEUTROPHILS NFR BLD AUTO: 63.9 %
NITRITE UR QL STRIP.AUTO: NEGATIVE
OSMOLALITY SERPL CALC.SUM OF ELEC: 290 MOSM/KG (ref 275–295)
PH UR STRIP.AUTO: 7 [PH] (ref 5–8)
PLATELET # BLD AUTO: 208 10(3)UL (ref 150–450)
POTASSIUM SERPL-SCNC: 3.5 MMOL/L (ref 3.5–5.1)
PROT SERPL-MCNC: 6.9 G/DL (ref 6.4–8.2)
PROT UR STRIP.AUTO-MCNC: NEGATIVE MG/DL
RBC # BLD AUTO: 4.24 X10(6)UL
RBC UR QL AUTO: NEGATIVE
SODIUM SERPL-SCNC: 139 MMOL/L (ref 136–145)
SP GR UR STRIP.AUTO: 1.01 (ref 1–1.03)
UROBILINOGEN UR STRIP.AUTO-MCNC: <2 MG/DL
WBC # BLD AUTO: 6.1 X10(3) UL (ref 4–11)

## 2022-02-12 PROCEDURE — 99284 EMERGENCY DEPT VISIT MOD MDM: CPT

## 2022-02-12 PROCEDURE — 87086 URINE CULTURE/COLONY COUNT: CPT | Performed by: EMERGENCY MEDICINE

## 2022-02-12 PROCEDURE — 96375 TX/PRO/DX INJ NEW DRUG ADDON: CPT

## 2022-02-12 PROCEDURE — 85025 COMPLETE CBC W/AUTO DIFF WBC: CPT | Performed by: EMERGENCY MEDICINE

## 2022-02-12 PROCEDURE — 80175 DRUG SCREEN QUAN LAMOTRIGINE: CPT | Performed by: EMERGENCY MEDICINE

## 2022-02-12 PROCEDURE — 80053 COMPREHEN METABOLIC PANEL: CPT | Performed by: EMERGENCY MEDICINE

## 2022-02-12 PROCEDURE — 81001 URINALYSIS AUTO W/SCOPE: CPT | Performed by: EMERGENCY MEDICINE

## 2022-02-12 PROCEDURE — 80201 ASSAY OF TOPIRAMATE: CPT | Performed by: EMERGENCY MEDICINE

## 2022-02-12 PROCEDURE — 96374 THER/PROPH/DIAG INJ IV PUSH: CPT

## 2022-02-12 PROCEDURE — 82962 GLUCOSE BLOOD TEST: CPT

## 2022-02-12 RX ORDER — KETOROLAC TROMETHAMINE 30 MG/ML
30 INJECTION, SOLUTION INTRAMUSCULAR; INTRAVENOUS ONCE
Status: COMPLETED | OUTPATIENT
Start: 2022-02-12 | End: 2022-02-12

## 2022-02-12 RX ORDER — DIPHENHYDRAMINE HYDROCHLORIDE 50 MG/ML
25 INJECTION INTRAMUSCULAR; INTRAVENOUS ONCE
Status: COMPLETED | OUTPATIENT
Start: 2022-02-12 | End: 2022-02-12

## 2022-02-12 RX ORDER — METOCLOPRAMIDE HYDROCHLORIDE 5 MG/ML
5 INJECTION INTRAMUSCULAR; INTRAVENOUS ONCE
Status: COMPLETED | OUTPATIENT
Start: 2022-02-12 | End: 2022-02-12

## 2022-02-12 NOTE — ED INITIAL ASSESSMENT (HPI)
PT TO ED WITH C/O 5 SEIZURES TODAY, HX OF SEIZURE DISORDER. PER MEDICS PT HAD 2 SEIZURES IN THEIR PRESENCE LASTING APPROX 10-30 SECONDS, 10 MG OF VERSED GIVEN IN PTA.  PT COMPLIANT WITH MEDICATIONS, LAST SEIZURE 1 MONTH  AGO

## 2022-02-14 LAB
LAMOTRIGINE: 7.2 UG/ML
TOPIRAMATE: 11.5 UG/ML

## 2022-04-14 ENCOUNTER — HOSPITAL ENCOUNTER (OUTPATIENT)
Age: 47
Discharge: HOME | End: 2022-04-14
Payer: MEDICARE

## 2022-04-14 VITALS
WEIGHT: 207 LBS | RESPIRATION RATE: 16 BRPM | OXYGEN SATURATION: 100 % | SYSTOLIC BLOOD PRESSURE: 104 MMHG | DIASTOLIC BLOOD PRESSURE: 70 MMHG | BODY MASS INDEX: 32.49 KG/M2 | HEART RATE: 83 BPM | HEIGHT: 67 IN | TEMPERATURE: 100 F

## 2022-04-14 DIAGNOSIS — L02.416 ABSCESS OF LEFT LEG: Primary | ICD-10-CM

## 2022-04-14 PROCEDURE — 87070 CULTURE OTHR SPECIMN AEROBIC: CPT | Performed by: NURSE PRACTITIONER

## 2022-04-14 PROCEDURE — 87205 SMEAR GRAM STAIN: CPT | Performed by: NURSE PRACTITIONER

## 2022-04-14 PROCEDURE — 87077 CULTURE AEROBIC IDENTIFY: CPT | Performed by: NURSE PRACTITIONER

## 2022-04-14 RX ORDER — SULFAMETHOXAZOLE AND TRIMETHOPRIM 800; 160 MG/1; MG/1
1 TABLET ORAL 2 TIMES DAILY
Qty: 20 TABLET | Refills: 0 | Status: SHIPPED | OUTPATIENT
Start: 2022-04-14 | End: 2022-04-24

## 2022-05-09 ENCOUNTER — APPOINTMENT (OUTPATIENT)
Dept: CT IMAGING | Facility: HOSPITAL | Age: 47
End: 2022-05-09
Attending: EMERGENCY MEDICINE
Payer: MEDICARE

## 2022-05-09 ENCOUNTER — HOSPITAL ENCOUNTER (EMERGENCY)
Facility: HOSPITAL | Age: 47
Discharge: HOME | End: 2022-05-09
Attending: EMERGENCY MEDICINE
Payer: MEDICARE

## 2022-05-09 VITALS
WEIGHT: 209.44 LBS | HEART RATE: 80 BPM | DIASTOLIC BLOOD PRESSURE: 54 MMHG | OXYGEN SATURATION: 100 % | RESPIRATION RATE: 18 BRPM | SYSTOLIC BLOOD PRESSURE: 95 MMHG | TEMPERATURE: 98 F | BODY MASS INDEX: 33 KG/M2

## 2022-05-09 DIAGNOSIS — G40.909 SEIZURE DISORDER (HCC): Primary | ICD-10-CM

## 2022-05-09 LAB
ALBUMIN SERPL-MCNC: 3.3 G/DL (ref 3.4–5)
ALBUMIN/GLOB SERPL: 1 {RATIO} (ref 1–2)
ALP LIVER SERPL-CCNC: 45 U/L
ALT SERPL-CCNC: 20 U/L
AMPHET UR QL SCN: NEGATIVE
ANION GAP SERPL CALC-SCNC: 7 MMOL/L (ref 0–18)
AST SERPL-CCNC: 17 U/L (ref 15–37)
BASOPHILS # BLD AUTO: 0.02 X10(3) UL (ref 0–0.2)
BASOPHILS NFR BLD AUTO: 0.3 %
BILIRUB SERPL-MCNC: 0.1 MG/DL (ref 0.1–2)
BILIRUB UR QL STRIP.AUTO: NEGATIVE
BUN BLD-MCNC: 20 MG/DL (ref 7–18)
CALCIUM BLD-MCNC: 8 MG/DL (ref 8.5–10.1)
CANNABINOIDS UR QL SCN: NEGATIVE
CHLORIDE SERPL-SCNC: 115 MMOL/L (ref 98–112)
CLARITY UR REFRACT.AUTO: CLEAR
CO2 SERPL-SCNC: 19 MMOL/L (ref 21–32)
COCAINE UR QL: NEGATIVE
CREAT BLD-MCNC: 1.05 MG/DL
CREAT UR-SCNC: 48.8 MG/DL
EOSINOPHIL # BLD AUTO: 0.09 X10(3) UL (ref 0–0.7)
EOSINOPHIL NFR BLD AUTO: 1.4 %
ERYTHROCYTE [DISTWIDTH] IN BLOOD BY AUTOMATED COUNT: 13.6 %
ETHANOL SERPL-MCNC: <3 MG/DL (ref ?–3)
GLOBULIN PLAS-MCNC: 3.4 G/DL (ref 2.8–4.4)
GLUCOSE BLD-MCNC: 85 MG/DL (ref 70–99)
GLUCOSE UR STRIP.AUTO-MCNC: NEGATIVE MG/DL
HCT VFR BLD AUTO: 38.9 %
HGB BLD-MCNC: 12.3 G/DL
IMM GRANULOCYTES # BLD AUTO: 0.04 X10(3) UL (ref 0–1)
IMM GRANULOCYTES NFR BLD: 0.6 %
KETONES UR STRIP.AUTO-MCNC: NEGATIVE MG/DL
LEUKOCYTE ESTERASE UR QL STRIP.AUTO: NEGATIVE
LYMPHOCYTES # BLD AUTO: 1.92 X10(3) UL (ref 1–4)
LYMPHOCYTES NFR BLD AUTO: 29.6 %
MCH RBC QN AUTO: 29.4 PG (ref 26–34)
MCHC RBC AUTO-ENTMCNC: 31.6 G/DL (ref 31–37)
MCV RBC AUTO: 92.8 FL
MDMA UR QL SCN: NEGATIVE
MONOCYTES # BLD AUTO: 0.55 X10(3) UL (ref 0.1–1)
MONOCYTES NFR BLD AUTO: 8.5 %
NEUTROPHILS # BLD AUTO: 3.86 X10 (3) UL (ref 1.5–7.7)
NEUTROPHILS # BLD AUTO: 3.86 X10(3) UL (ref 1.5–7.7)
NEUTROPHILS NFR BLD AUTO: 59.6 %
NITRITE UR QL STRIP.AUTO: NEGATIVE
OPIATES UR QL SCN: NEGATIVE
OSMOLALITY SERPL CALC.SUM OF ELEC: 294 MOSM/KG (ref 275–295)
OXYCODONE UR QL SCN: NEGATIVE
PH UR STRIP.AUTO: 6 [PH] (ref 5–8)
PLATELET # BLD AUTO: 207 10(3)UL (ref 150–450)
POTASSIUM SERPL-SCNC: 3.4 MMOL/L (ref 3.5–5.1)
PROT SERPL-MCNC: 6.7 G/DL (ref 6.4–8.2)
PROT UR STRIP.AUTO-MCNC: NEGATIVE MG/DL
RBC # BLD AUTO: 4.19 X10(6)UL
RBC UR QL AUTO: NEGATIVE
SODIUM SERPL-SCNC: 141 MMOL/L (ref 136–145)
SP GR UR STRIP.AUTO: 1.01 (ref 1–1.03)
UROBILINOGEN UR STRIP.AUTO-MCNC: <2 MG/DL
WBC # BLD AUTO: 6.5 X10(3) UL (ref 4–11)

## 2022-05-09 PROCEDURE — 80307 DRUG TEST PRSMV CHEM ANLYZR: CPT | Performed by: EMERGENCY MEDICINE

## 2022-05-09 PROCEDURE — 80175 DRUG SCREEN QUAN LAMOTRIGINE: CPT | Performed by: EMERGENCY MEDICINE

## 2022-05-09 PROCEDURE — 99284 EMERGENCY DEPT VISIT MOD MDM: CPT

## 2022-05-09 PROCEDURE — 70450 CT HEAD/BRAIN W/O DYE: CPT | Performed by: EMERGENCY MEDICINE

## 2022-05-09 PROCEDURE — 96375 TX/PRO/DX INJ NEW DRUG ADDON: CPT

## 2022-05-09 PROCEDURE — 82077 ASSAY SPEC XCP UR&BREATH IA: CPT | Performed by: EMERGENCY MEDICINE

## 2022-05-09 PROCEDURE — 81003 URINALYSIS AUTO W/O SCOPE: CPT | Performed by: EMERGENCY MEDICINE

## 2022-05-09 PROCEDURE — 80053 COMPREHEN METABOLIC PANEL: CPT | Performed by: EMERGENCY MEDICINE

## 2022-05-09 PROCEDURE — 96361 HYDRATE IV INFUSION ADD-ON: CPT

## 2022-05-09 PROCEDURE — 96374 THER/PROPH/DIAG INJ IV PUSH: CPT

## 2022-05-09 PROCEDURE — 85025 COMPLETE CBC W/AUTO DIFF WBC: CPT | Performed by: EMERGENCY MEDICINE

## 2022-05-09 RX ORDER — LORAZEPAM 2 MG/ML
INJECTION INTRAMUSCULAR
Status: COMPLETED
Start: 2022-05-09 | End: 2022-05-09

## 2022-05-09 RX ORDER — METOCLOPRAMIDE HYDROCHLORIDE 5 MG/ML
10 INJECTION INTRAMUSCULAR; INTRAVENOUS ONCE
Status: COMPLETED | OUTPATIENT
Start: 2022-05-09 | End: 2022-05-09

## 2022-05-09 RX ORDER — KETOROLAC TROMETHAMINE 30 MG/ML
30 INJECTION, SOLUTION INTRAMUSCULAR; INTRAVENOUS ONCE
Status: COMPLETED | OUTPATIENT
Start: 2022-05-09 | End: 2022-05-09

## 2022-05-09 RX ORDER — DIPHENHYDRAMINE HYDROCHLORIDE 50 MG/ML
25 INJECTION INTRAMUSCULAR; INTRAVENOUS ONCE
Status: COMPLETED | OUTPATIENT
Start: 2022-05-09 | End: 2022-05-09

## 2022-05-09 RX ORDER — LEVETIRACETAM 500 MG/5ML
500 INJECTION, SOLUTION, CONCENTRATE INTRAVENOUS ONCE
Status: COMPLETED | OUTPATIENT
Start: 2022-05-09 | End: 2022-05-09

## 2022-05-09 NOTE — ED INITIAL ASSESSMENT (HPI)
Pt arrives via EMS from work, states she has hx of TBI and seizures, had seizure magnet placed in November. Pt seized 3 times today, used magnet twice, pt also given 2 mg of versed via EMS. Alert and wake currently.

## 2022-05-11 LAB — LAMOTRIGINE: 4.1 UG/ML

## 2022-06-12 ENCOUNTER — HOSPITAL ENCOUNTER (EMERGENCY)
Facility: HOSPITAL | Age: 47
Discharge: HOME | End: 2022-06-12
Attending: EMERGENCY MEDICINE
Payer: MEDICARE

## 2022-06-12 VITALS
DIASTOLIC BLOOD PRESSURE: 67 MMHG | BODY MASS INDEX: 32.65 KG/M2 | HEIGHT: 67 IN | OXYGEN SATURATION: 97 % | RESPIRATION RATE: 20 BRPM | SYSTOLIC BLOOD PRESSURE: 105 MMHG | WEIGHT: 208 LBS | TEMPERATURE: 98 F | HEART RATE: 108 BPM

## 2022-06-12 DIAGNOSIS — R51.9 NONINTRACTABLE HEADACHE, UNSPECIFIED CHRONICITY PATTERN, UNSPECIFIED HEADACHE TYPE: ICD-10-CM

## 2022-06-12 DIAGNOSIS — R56.9 SEIZURE-LIKE ACTIVITY (HCC): Primary | ICD-10-CM

## 2022-06-12 LAB
ALBUMIN SERPL-MCNC: 3.6 G/DL (ref 3.4–5)
ALBUMIN/GLOB SERPL: 1.1 {RATIO} (ref 1–2)
ALP LIVER SERPL-CCNC: 49 U/L
ALT SERPL-CCNC: 20 U/L
ANION GAP SERPL CALC-SCNC: 8 MMOL/L (ref 0–18)
AST SERPL-CCNC: 14 U/L (ref 15–37)
BASOPHILS # BLD AUTO: 0.02 X10(3) UL (ref 0–0.2)
BASOPHILS NFR BLD AUTO: 0.3 %
BILIRUB SERPL-MCNC: 0.2 MG/DL (ref 0.1–2)
BUN BLD-MCNC: 22 MG/DL (ref 7–18)
CALCIUM BLD-MCNC: 8.5 MG/DL (ref 8.5–10.1)
CHLORIDE SERPL-SCNC: 113 MMOL/L (ref 98–112)
CO2 SERPL-SCNC: 21 MMOL/L (ref 21–32)
CREAT BLD-MCNC: 1.2 MG/DL
EOSINOPHIL # BLD AUTO: 0.04 X10(3) UL (ref 0–0.7)
EOSINOPHIL NFR BLD AUTO: 0.5 %
ERYTHROCYTE [DISTWIDTH] IN BLOOD BY AUTOMATED COUNT: 13.2 %
GLOBULIN PLAS-MCNC: 3.4 G/DL (ref 2.8–4.4)
GLUCOSE BLD-MCNC: 100 MG/DL (ref 70–99)
HCT VFR BLD AUTO: 40.6 %
HGB BLD-MCNC: 13 G/DL
IMM GRANULOCYTES # BLD AUTO: 0.03 X10(3) UL (ref 0–1)
IMM GRANULOCYTES NFR BLD: 0.4 %
LYMPHOCYTES # BLD AUTO: 1.66 X10(3) UL (ref 1–4)
LYMPHOCYTES NFR BLD AUTO: 22.2 %
MCH RBC QN AUTO: 29.5 PG (ref 26–34)
MCHC RBC AUTO-ENTMCNC: 32 G/DL (ref 31–37)
MCV RBC AUTO: 92.3 FL
MONOCYTES # BLD AUTO: 0.46 X10(3) UL (ref 0.1–1)
MONOCYTES NFR BLD AUTO: 6.1 %
NEUTROPHILS # BLD AUTO: 5.27 X10 (3) UL (ref 1.5–7.7)
NEUTROPHILS # BLD AUTO: 5.27 X10(3) UL (ref 1.5–7.7)
NEUTROPHILS NFR BLD AUTO: 70.5 %
OSMOLALITY SERPL CALC.SUM OF ELEC: 297 MOSM/KG (ref 275–295)
PLATELET # BLD AUTO: 218 10(3)UL (ref 150–450)
POTASSIUM SERPL-SCNC: 3.4 MMOL/L (ref 3.5–5.1)
PROT SERPL-MCNC: 7 G/DL (ref 6.4–8.2)
RBC # BLD AUTO: 4.4 X10(6)UL
SODIUM SERPL-SCNC: 142 MMOL/L (ref 136–145)
WBC # BLD AUTO: 7.5 X10(3) UL (ref 4–11)

## 2022-06-12 PROCEDURE — 96374 THER/PROPH/DIAG INJ IV PUSH: CPT

## 2022-06-12 PROCEDURE — 80053 COMPREHEN METABOLIC PANEL: CPT | Performed by: EMERGENCY MEDICINE

## 2022-06-12 PROCEDURE — 96361 HYDRATE IV INFUSION ADD-ON: CPT

## 2022-06-12 PROCEDURE — 99284 EMERGENCY DEPT VISIT MOD MDM: CPT

## 2022-06-12 PROCEDURE — 96375 TX/PRO/DX INJ NEW DRUG ADDON: CPT

## 2022-06-12 PROCEDURE — 85025 COMPLETE CBC W/AUTO DIFF WBC: CPT | Performed by: EMERGENCY MEDICINE

## 2022-06-12 RX ORDER — DIPHENHYDRAMINE HYDROCHLORIDE 50 MG/ML
25 INJECTION INTRAMUSCULAR; INTRAVENOUS ONCE
Status: COMPLETED | OUTPATIENT
Start: 2022-06-12 | End: 2022-06-12

## 2022-06-12 RX ORDER — METOCLOPRAMIDE HYDROCHLORIDE 5 MG/ML
10 INJECTION INTRAMUSCULAR; INTRAVENOUS ONCE
Status: COMPLETED | OUTPATIENT
Start: 2022-06-12 | End: 2022-06-12

## 2022-06-12 RX ORDER — KETOROLAC TROMETHAMINE 30 MG/ML
15 INJECTION, SOLUTION INTRAMUSCULAR; INTRAVENOUS ONCE
Status: COMPLETED | OUTPATIENT
Start: 2022-06-12 | End: 2022-06-12

## 2022-06-12 NOTE — ED INITIAL ASSESSMENT (HPI)
Pt presents to the ED via EMS with complaints of seizure. Pt has hx of migraines and seizures. Pt was assisted to floor upon onset of seizure by coworkers. Ems was called and found pt to have grand mal seizure. Pt given versed 10 mg IN enroute. Pt awake and alert, oriented x 3, skin w/d,resps reg/unlabored. Speech clear.

## 2022-07-05 ENCOUNTER — HOSPITAL ENCOUNTER (EMERGENCY)
Facility: HOSPITAL | Age: 47
Discharge: HOME | End: 2022-07-05
Attending: EMERGENCY MEDICINE
Payer: MEDICARE

## 2022-07-05 ENCOUNTER — APPOINTMENT (OUTPATIENT)
Dept: GENERAL RADIOLOGY | Facility: HOSPITAL | Age: 47
End: 2022-07-05
Attending: EMERGENCY MEDICINE
Payer: MEDICARE

## 2022-07-05 VITALS
HEIGHT: 67 IN | SYSTOLIC BLOOD PRESSURE: 102 MMHG | RESPIRATION RATE: 19 BRPM | DIASTOLIC BLOOD PRESSURE: 70 MMHG | OXYGEN SATURATION: 98 % | BODY MASS INDEX: 32.18 KG/M2 | WEIGHT: 205 LBS | TEMPERATURE: 98 F | HEART RATE: 60 BPM

## 2022-07-05 DIAGNOSIS — R56.9 SEIZURE-LIKE ACTIVITY (HCC): Primary | ICD-10-CM

## 2022-07-05 DIAGNOSIS — G43.809 OTHER MIGRAINE WITHOUT STATUS MIGRAINOSUS, NOT INTRACTABLE: ICD-10-CM

## 2022-07-05 LAB
ALBUMIN SERPL-MCNC: 3.3 G/DL (ref 3.4–5)
ALBUMIN/GLOB SERPL: 1 {RATIO} (ref 1–2)
ALP LIVER SERPL-CCNC: 47 U/L
ALT SERPL-CCNC: 17 U/L
ANION GAP SERPL CALC-SCNC: 8 MMOL/L (ref 0–18)
AST SERPL-CCNC: 14 U/L (ref 15–37)
BASOPHILS # BLD AUTO: 0.02 X10(3) UL (ref 0–0.2)
BASOPHILS NFR BLD AUTO: 0.4 %
BILIRUB SERPL-MCNC: 0.2 MG/DL (ref 0.1–2)
BUN BLD-MCNC: 22 MG/DL (ref 7–18)
CALCIUM BLD-MCNC: 8.5 MG/DL (ref 8.5–10.1)
CHLORIDE SERPL-SCNC: 117 MMOL/L (ref 98–112)
CO2 SERPL-SCNC: 18 MMOL/L (ref 21–32)
CREAT BLD-MCNC: 1.21 MG/DL
EOSINOPHIL # BLD AUTO: 0.08 X10(3) UL (ref 0–0.7)
EOSINOPHIL NFR BLD AUTO: 1.4 %
ERYTHROCYTE [DISTWIDTH] IN BLOOD BY AUTOMATED COUNT: 13.6 %
GLOBULIN PLAS-MCNC: 3.3 G/DL (ref 2.8–4.4)
GLUCOSE BLD-MCNC: 135 MG/DL (ref 70–99)
HCT VFR BLD AUTO: 40 %
HGB BLD-MCNC: 13 G/DL
IMM GRANULOCYTES # BLD AUTO: 0.02 X10(3) UL (ref 0–1)
IMM GRANULOCYTES NFR BLD: 0.4 %
LYMPHOCYTES # BLD AUTO: 1.42 X10(3) UL (ref 1–4)
LYMPHOCYTES NFR BLD AUTO: 25.7 %
MCH RBC QN AUTO: 30 PG (ref 26–34)
MCHC RBC AUTO-ENTMCNC: 32.5 G/DL (ref 31–37)
MCV RBC AUTO: 92.2 FL
MONOCYTES # BLD AUTO: 0.32 X10(3) UL (ref 0.1–1)
MONOCYTES NFR BLD AUTO: 5.8 %
NEUTROPHILS # BLD AUTO: 3.67 X10 (3) UL (ref 1.5–7.7)
NEUTROPHILS # BLD AUTO: 3.67 X10(3) UL (ref 1.5–7.7)
NEUTROPHILS NFR BLD AUTO: 66.3 %
OSMOLALITY SERPL CALC.SUM OF ELEC: 301 MOSM/KG (ref 275–295)
PLATELET # BLD AUTO: 168 10(3)UL (ref 150–450)
POTASSIUM SERPL-SCNC: 3.7 MMOL/L (ref 3.5–5.1)
PROT SERPL-MCNC: 6.6 G/DL (ref 6.4–8.2)
RBC # BLD AUTO: 4.34 X10(6)UL
SODIUM SERPL-SCNC: 143 MMOL/L (ref 136–145)
WBC # BLD AUTO: 5.5 X10(3) UL (ref 4–11)

## 2022-07-05 PROCEDURE — 99284 EMERGENCY DEPT VISIT MOD MDM: CPT

## 2022-07-05 PROCEDURE — 96361 HYDRATE IV INFUSION ADD-ON: CPT

## 2022-07-05 PROCEDURE — 99285 EMERGENCY DEPT VISIT HI MDM: CPT

## 2022-07-05 PROCEDURE — 96375 TX/PRO/DX INJ NEW DRUG ADDON: CPT

## 2022-07-05 PROCEDURE — 85025 COMPLETE CBC W/AUTO DIFF WBC: CPT | Performed by: EMERGENCY MEDICINE

## 2022-07-05 PROCEDURE — 80053 COMPREHEN METABOLIC PANEL: CPT | Performed by: EMERGENCY MEDICINE

## 2022-07-05 PROCEDURE — 96374 THER/PROPH/DIAG INJ IV PUSH: CPT

## 2022-07-05 PROCEDURE — 71045 X-RAY EXAM CHEST 1 VIEW: CPT | Performed by: EMERGENCY MEDICINE

## 2022-07-05 RX ORDER — DIPHENHYDRAMINE HYDROCHLORIDE 50 MG/ML
25 INJECTION INTRAMUSCULAR; INTRAVENOUS ONCE
Status: COMPLETED | OUTPATIENT
Start: 2022-07-05 | End: 2022-07-05

## 2022-07-05 RX ORDER — METOCLOPRAMIDE HYDROCHLORIDE 5 MG/ML
10 INJECTION INTRAMUSCULAR; INTRAVENOUS ONCE
Status: COMPLETED | OUTPATIENT
Start: 2022-07-05 | End: 2022-07-05

## 2022-07-05 RX ORDER — MORPHINE SULFATE 4 MG/ML
4 INJECTION, SOLUTION INTRAMUSCULAR; INTRAVENOUS ONCE
Status: COMPLETED | OUTPATIENT
Start: 2022-07-05 | End: 2022-07-05

## 2022-07-05 RX ORDER — KETOROLAC TROMETHAMINE 15 MG/ML
15 INJECTION, SOLUTION INTRAMUSCULAR; INTRAVENOUS ONCE
Status: COMPLETED | OUTPATIENT
Start: 2022-07-05 | End: 2022-07-05

## 2022-07-05 NOTE — ED INITIAL ASSESSMENT (HPI)
Pt coming from home with witnesses seizure, pt at work was witnessed with blank stare and started to seize. Pt eased to the ground by co-workers with no fall or injuries. Pt given 1mg IN of versed in route to ED. Pt reports she has history of seizures and accidentally mixed up her topomax and lamictal medications this morning. Pt additionally c/o increased migraines for x1 week.  Pt aox3 upon ED arrival

## 2022-08-01 ENCOUNTER — HOSPITAL ENCOUNTER (EMERGENCY)
Facility: HOSPITAL | Age: 47
Discharge: HOME | End: 2022-08-01
Attending: EMERGENCY MEDICINE
Payer: MEDICARE

## 2022-08-01 ENCOUNTER — APPOINTMENT (OUTPATIENT)
Dept: GENERAL RADIOLOGY | Facility: HOSPITAL | Age: 47
End: 2022-08-01
Attending: EMERGENCY MEDICINE
Payer: MEDICARE

## 2022-08-01 VITALS
DIASTOLIC BLOOD PRESSURE: 67 MMHG | TEMPERATURE: 98 F | RESPIRATION RATE: 25 BRPM | OXYGEN SATURATION: 96 % | HEART RATE: 73 BPM | BODY MASS INDEX: 32.49 KG/M2 | SYSTOLIC BLOOD PRESSURE: 116 MMHG | HEIGHT: 67 IN | WEIGHT: 207 LBS

## 2022-08-01 DIAGNOSIS — G40.909 SEIZURE DISORDER (HCC): Primary | ICD-10-CM

## 2022-08-01 DIAGNOSIS — G43.809 OTHER MIGRAINE WITHOUT STATUS MIGRAINOSUS, NOT INTRACTABLE: ICD-10-CM

## 2022-08-01 DIAGNOSIS — R73.9 HYPERGLYCEMIA: ICD-10-CM

## 2022-08-01 DIAGNOSIS — E87.2 METABOLIC ACIDOSIS: ICD-10-CM

## 2022-08-01 LAB
ALBUMIN SERPL-MCNC: 3.3 G/DL (ref 3.4–5)
ALBUMIN/GLOB SERPL: 1 {RATIO} (ref 1–2)
ALP LIVER SERPL-CCNC: 52 U/L
ALT SERPL-CCNC: 16 U/L
ANION GAP SERPL CALC-SCNC: 7 MMOL/L (ref 0–18)
AST SERPL-CCNC: 14 U/L (ref 15–37)
BASOPHILS # BLD AUTO: 0.02 X10(3) UL (ref 0–0.2)
BASOPHILS NFR BLD AUTO: 0.3 %
BILIRUB SERPL-MCNC: 0.2 MG/DL (ref 0.1–2)
BUN BLD-MCNC: 22 MG/DL (ref 7–18)
CALCIUM BLD-MCNC: 8.7 MG/DL (ref 8.5–10.1)
CHLORIDE SERPL-SCNC: 115 MMOL/L (ref 98–112)
CK SERPL-CCNC: 179 U/L
CO2 SERPL-SCNC: 20 MMOL/L (ref 21–32)
CREAT BLD-MCNC: 1.27 MG/DL
EOSINOPHIL # BLD AUTO: 0.07 X10(3) UL (ref 0–0.7)
EOSINOPHIL NFR BLD AUTO: 1 %
ERYTHROCYTE [DISTWIDTH] IN BLOOD BY AUTOMATED COUNT: 13.3 %
GLOBULIN PLAS-MCNC: 3.3 G/DL (ref 2.8–4.4)
GLUCOSE BLD-MCNC: 120 MG/DL (ref 70–99)
GLUCOSE BLD-MCNC: 124 MG/DL (ref 70–99)
HCT VFR BLD AUTO: 38.3 %
HGB BLD-MCNC: 12.3 G/DL
IMM GRANULOCYTES # BLD AUTO: 0.02 X10(3) UL (ref 0–1)
IMM GRANULOCYTES NFR BLD: 0.3 %
LYMPHOCYTES # BLD AUTO: 1.41 X10(3) UL (ref 1–4)
LYMPHOCYTES NFR BLD AUTO: 20.7 %
MAGNESIUM SERPL-MCNC: 1.9 MG/DL (ref 1.6–2.6)
MCH RBC QN AUTO: 29.9 PG (ref 26–34)
MCHC RBC AUTO-ENTMCNC: 32.1 G/DL (ref 31–37)
MCV RBC AUTO: 93 FL
MONOCYTES # BLD AUTO: 0.42 X10(3) UL (ref 0.1–1)
MONOCYTES NFR BLD AUTO: 6.2 %
NEUTROPHILS # BLD AUTO: 4.86 X10 (3) UL (ref 1.5–7.7)
NEUTROPHILS # BLD AUTO: 4.86 X10(3) UL (ref 1.5–7.7)
NEUTROPHILS NFR BLD AUTO: 71.5 %
OSMOLALITY SERPL CALC.SUM OF ELEC: 299 MOSM/KG (ref 275–295)
PLATELET # BLD AUTO: 220 10(3)UL (ref 150–450)
POTASSIUM SERPL-SCNC: 3.5 MMOL/L (ref 3.5–5.1)
PROCALCITONIN SERPL-MCNC: <0.05 NG/ML (ref ?–0.16)
PROT SERPL-MCNC: 6.6 G/DL (ref 6.4–8.2)
RBC # BLD AUTO: 4.12 X10(6)UL
SARS-COV-2 RNA RESP QL NAA+PROBE: NOT DETECTED
SODIUM SERPL-SCNC: 142 MMOL/L (ref 136–145)
TSI SER-ACNC: 0.72 MIU/ML (ref 0.36–3.74)
WBC # BLD AUTO: 6.8 X10(3) UL (ref 4–11)

## 2022-08-01 PROCEDURE — 96361 HYDRATE IV INFUSION ADD-ON: CPT

## 2022-08-01 PROCEDURE — 99284 EMERGENCY DEPT VISIT MOD MDM: CPT

## 2022-08-01 PROCEDURE — 84145 PROCALCITONIN (PCT): CPT | Performed by: EMERGENCY MEDICINE

## 2022-08-01 PROCEDURE — 96374 THER/PROPH/DIAG INJ IV PUSH: CPT

## 2022-08-01 PROCEDURE — 80053 COMPREHEN METABOLIC PANEL: CPT | Performed by: EMERGENCY MEDICINE

## 2022-08-01 PROCEDURE — 96375 TX/PRO/DX INJ NEW DRUG ADDON: CPT

## 2022-08-01 PROCEDURE — 85025 COMPLETE CBC W/AUTO DIFF WBC: CPT | Performed by: EMERGENCY MEDICINE

## 2022-08-01 PROCEDURE — 93005 ELECTROCARDIOGRAM TRACING: CPT

## 2022-08-01 PROCEDURE — 82550 ASSAY OF CK (CPK): CPT | Performed by: EMERGENCY MEDICINE

## 2022-08-01 PROCEDURE — 82962 GLUCOSE BLOOD TEST: CPT

## 2022-08-01 PROCEDURE — 71045 X-RAY EXAM CHEST 1 VIEW: CPT | Performed by: EMERGENCY MEDICINE

## 2022-08-01 PROCEDURE — 93010 ELECTROCARDIOGRAM REPORT: CPT

## 2022-08-01 PROCEDURE — 84443 ASSAY THYROID STIM HORMONE: CPT | Performed by: EMERGENCY MEDICINE

## 2022-08-01 PROCEDURE — 83735 ASSAY OF MAGNESIUM: CPT | Performed by: EMERGENCY MEDICINE

## 2022-08-01 RX ORDER — LORAZEPAM 1 MG/1
2 TABLET ORAL ONCE
Status: COMPLETED | OUTPATIENT
Start: 2022-08-01 | End: 2022-08-01

## 2022-08-01 RX ORDER — DIPHENHYDRAMINE HYDROCHLORIDE 50 MG/ML
25 INJECTION INTRAMUSCULAR; INTRAVENOUS ONCE
Status: COMPLETED | OUTPATIENT
Start: 2022-08-01 | End: 2022-08-01

## 2022-08-01 RX ORDER — DEXAMETHASONE SODIUM PHOSPHATE 10 MG/ML
10 INJECTION, SOLUTION INTRAMUSCULAR; INTRAVENOUS ONCE
Status: COMPLETED | OUTPATIENT
Start: 2022-08-01 | End: 2022-08-01

## 2022-08-01 RX ORDER — PROCHLORPERAZINE EDISYLATE 5 MG/ML
10 INJECTION INTRAMUSCULAR; INTRAVENOUS ONCE
Status: COMPLETED | OUTPATIENT
Start: 2022-08-01 | End: 2022-08-01

## 2022-08-01 NOTE — ED INITIAL ASSESSMENT (HPI)
Pt to the ER via EMS for a seizure that occurred while at work. Pt states she has a seizure disorder last seizure last night. Pt was at work and had a seizure. Pt was able to lower her self to the ground. Pt denies hitting her head. Pt a/ox4. Resp even and nonlabored. Skin warm and dry. 10 versed given in route.  Pt present with a 18g AC right

## 2022-08-04 LAB
ATRIAL RATE: 80 BPM
P AXIS: 26 DEGREES
P-R INTERVAL: 146 MS
Q-T INTERVAL: 356 MS
QRS DURATION: 86 MS
QTC CALCULATION (BEZET): 410 MS
R AXIS: -12 DEGREES
T AXIS: 7 DEGREES
VENTRICULAR RATE: 80 BPM

## 2022-08-08 ENCOUNTER — HOSPITAL ENCOUNTER (EMERGENCY)
Facility: HOSPITAL | Age: 47
Discharge: HOME | End: 2022-08-08
Attending: EMERGENCY MEDICINE
Payer: MEDICARE

## 2022-08-08 VITALS
OXYGEN SATURATION: 97 % | BODY MASS INDEX: 32.18 KG/M2 | WEIGHT: 205 LBS | RESPIRATION RATE: 21 BRPM | DIASTOLIC BLOOD PRESSURE: 59 MMHG | HEART RATE: 73 BPM | HEIGHT: 67 IN | SYSTOLIC BLOOD PRESSURE: 98 MMHG

## 2022-08-08 DIAGNOSIS — G43.009 MIGRAINE WITHOUT AURA AND WITHOUT STATUS MIGRAINOSUS, NOT INTRACTABLE: ICD-10-CM

## 2022-08-08 DIAGNOSIS — G40.909 SEIZURE DISORDER (HCC): Primary | ICD-10-CM

## 2022-08-08 PROCEDURE — 96374 THER/PROPH/DIAG INJ IV PUSH: CPT

## 2022-08-08 PROCEDURE — 96361 HYDRATE IV INFUSION ADD-ON: CPT

## 2022-08-08 PROCEDURE — 99284 EMERGENCY DEPT VISIT MOD MDM: CPT

## 2022-08-08 PROCEDURE — 96375 TX/PRO/DX INJ NEW DRUG ADDON: CPT

## 2022-08-08 RX ORDER — METOCLOPRAMIDE HYDROCHLORIDE 5 MG/ML
10 INJECTION INTRAMUSCULAR; INTRAVENOUS ONCE
Status: COMPLETED | OUTPATIENT
Start: 2022-08-08 | End: 2022-08-08

## 2022-08-08 RX ORDER — DIPHENHYDRAMINE HYDROCHLORIDE 50 MG/ML
25 INJECTION INTRAMUSCULAR; INTRAVENOUS ONCE
Status: COMPLETED | OUTPATIENT
Start: 2022-08-08 | End: 2022-08-08

## 2022-08-08 RX ORDER — SODIUM CHLORIDE 9 MG/ML
1000 INJECTION, SOLUTION INTRAVENOUS ONCE
Status: COMPLETED | OUTPATIENT
Start: 2022-08-08 | End: 2022-08-08

## 2022-08-08 RX ORDER — DEXAMETHASONE SODIUM PHOSPHATE 10 MG/ML
10 INJECTION, SOLUTION INTRAMUSCULAR; INTRAVENOUS ONCE
Status: COMPLETED | OUTPATIENT
Start: 2022-08-08 | End: 2022-08-08

## 2022-08-08 RX ORDER — KETOROLAC TROMETHAMINE 15 MG/ML
15 INJECTION, SOLUTION INTRAMUSCULAR; INTRAVENOUS ONCE
Status: COMPLETED | OUTPATIENT
Start: 2022-08-08 | End: 2022-08-08

## 2022-08-08 RX ORDER — ACETAMINOPHEN 500 MG
1000 TABLET ORAL ONCE
Status: COMPLETED | OUTPATIENT
Start: 2022-08-08 | End: 2022-08-08

## 2022-08-08 NOTE — ED INITIAL ASSESSMENT (HPI)
Pt was at work had a seizure and while with medics had another seizure that lasted about 30 seconds received 10 of versed. Pt reports double vision. Pt A/O x4. Pt reports headache, hx of migraines, reports having 4 headaches this week.

## 2022-08-15 ENCOUNTER — HOSPITAL ENCOUNTER (OUTPATIENT)
Age: 47
Discharge: HOME | End: 2022-08-15
Payer: MEDICARE

## 2022-08-15 VITALS
DIASTOLIC BLOOD PRESSURE: 72 MMHG | RESPIRATION RATE: 18 BRPM | TEMPERATURE: 99 F | WEIGHT: 207 LBS | OXYGEN SATURATION: 98 % | BODY MASS INDEX: 32.49 KG/M2 | HEART RATE: 82 BPM | SYSTOLIC BLOOD PRESSURE: 104 MMHG | HEIGHT: 67 IN

## 2022-08-15 DIAGNOSIS — N30.00 ACUTE CYSTITIS WITHOUT HEMATURIA: Primary | ICD-10-CM

## 2022-08-15 LAB
POCT GLUCOSE URINE: 100 MG/DL
POCT NITRITE URINE: POSITIVE
POCT PH URINE: 5 (ref 5–8)
POCT SPECIFIC GRAVITY URINE: 1.02
POCT URINE CLARITY: CLEAR
POCT UROBILINOGEN URINE: 2 MG/DL

## 2022-08-15 PROCEDURE — 81002 URINALYSIS NONAUTO W/O SCOPE: CPT | Performed by: NURSE PRACTITIONER

## 2022-08-15 PROCEDURE — 99213 OFFICE O/P EST LOW 20 MIN: CPT | Performed by: NURSE PRACTITIONER

## 2022-08-15 RX ORDER — CEPHALEXIN 500 MG/1
500 CAPSULE ORAL 2 TIMES DAILY
Qty: 10 CAPSULE | Refills: 0 | Status: SHIPPED | OUTPATIENT
Start: 2022-08-15 | End: 2022-08-20

## 2022-08-15 NOTE — ED INITIAL ASSESSMENT (HPI)
Pt c/o UTI symptoms x3 days; burning upon urination, urinary frequency, Pt began taking AZO. Pt hx epilepsy and concerned because infections affect her diagnosis.  Last seizure was 1week ago

## 2022-08-16 ENCOUNTER — APPOINTMENT (OUTPATIENT)
Dept: CT IMAGING | Facility: HOSPITAL | Age: 47
End: 2022-08-16
Attending: EMERGENCY MEDICINE
Payer: MEDICARE

## 2022-08-16 ENCOUNTER — HOSPITAL ENCOUNTER (EMERGENCY)
Facility: HOSPITAL | Age: 47
Discharge: HOME | End: 2022-08-16
Attending: EMERGENCY MEDICINE
Payer: MEDICARE

## 2022-08-16 VITALS
TEMPERATURE: 98 F | HEART RATE: 76 BPM | WEIGHT: 207 LBS | OXYGEN SATURATION: 99 % | HEIGHT: 67 IN | BODY MASS INDEX: 32.49 KG/M2 | DIASTOLIC BLOOD PRESSURE: 72 MMHG | SYSTOLIC BLOOD PRESSURE: 127 MMHG | RESPIRATION RATE: 16 BRPM

## 2022-08-16 DIAGNOSIS — G43.809 OTHER MIGRAINE WITHOUT STATUS MIGRAINOSUS, NOT INTRACTABLE: Primary | ICD-10-CM

## 2022-08-16 DIAGNOSIS — R55 SYNCOPE, UNSPECIFIED SYNCOPE TYPE: ICD-10-CM

## 2022-08-16 LAB
ALBUMIN SERPL-MCNC: 3.5 G/DL (ref 3.4–5)
ALBUMIN/GLOB SERPL: 1.1 {RATIO} (ref 1–2)
ALP LIVER SERPL-CCNC: 53 U/L
ALT SERPL-CCNC: 17 U/L
ANION GAP SERPL CALC-SCNC: 5 MMOL/L (ref 0–18)
AST SERPL-CCNC: 18 U/L (ref 15–37)
BILIRUB SERPL-MCNC: 0.2 MG/DL (ref 0.1–2)
BUN BLD-MCNC: 18 MG/DL (ref 7–18)
CALCIUM BLD-MCNC: 8.5 MG/DL (ref 8.5–10.1)
CHLORIDE SERPL-SCNC: 115 MMOL/L (ref 98–112)
CO2 SERPL-SCNC: 16 MMOL/L (ref 21–32)
CREAT BLD-MCNC: 1.05 MG/DL
GFR SERPLBLD BASED ON 1.73 SQ M-ARVRAT: 66 ML/MIN/1.73M2 (ref 60–?)
GLOBULIN PLAS-MCNC: 3.3 G/DL (ref 2.8–4.4)
GLUCOSE BLD-MCNC: 94 MG/DL (ref 70–99)
OSMOLALITY SERPL CALC.SUM OF ELEC: 284 MOSM/KG (ref 275–295)
POTASSIUM SERPL-SCNC: 3.5 MMOL/L (ref 3.5–5.1)
PROT SERPL-MCNC: 6.8 G/DL (ref 6.4–8.2)
SODIUM SERPL-SCNC: 136 MMOL/L (ref 136–145)
TROPONIN I HIGH SENSITIVITY: 3 NG/L

## 2022-08-16 PROCEDURE — 93010 ELECTROCARDIOGRAM REPORT: CPT | Performed by: EMERGENCY MEDICINE

## 2022-08-16 PROCEDURE — 70450 CT HEAD/BRAIN W/O DYE: CPT | Performed by: EMERGENCY MEDICINE

## 2022-08-16 PROCEDURE — 99284 EMERGENCY DEPT VISIT MOD MDM: CPT | Performed by: EMERGENCY MEDICINE

## 2022-08-16 PROCEDURE — 84484 ASSAY OF TROPONIN QUANT: CPT | Performed by: EMERGENCY MEDICINE

## 2022-08-16 PROCEDURE — 96361 HYDRATE IV INFUSION ADD-ON: CPT | Performed by: EMERGENCY MEDICINE

## 2022-08-16 PROCEDURE — 96365 THER/PROPH/DIAG IV INF INIT: CPT | Performed by: EMERGENCY MEDICINE

## 2022-08-16 PROCEDURE — 80053 COMPREHEN METABOLIC PANEL: CPT | Performed by: EMERGENCY MEDICINE

## 2022-08-16 PROCEDURE — 96375 TX/PRO/DX INJ NEW DRUG ADDON: CPT | Performed by: EMERGENCY MEDICINE

## 2022-08-16 PROCEDURE — 85025 COMPLETE CBC W/AUTO DIFF WBC: CPT | Performed by: EMERGENCY MEDICINE

## 2022-08-16 PROCEDURE — 93005 ELECTROCARDIOGRAM TRACING: CPT

## 2022-08-16 RX ORDER — KETOROLAC TROMETHAMINE 15 MG/ML
15 INJECTION, SOLUTION INTRAMUSCULAR; INTRAVENOUS ONCE
Status: COMPLETED | OUTPATIENT
Start: 2022-08-16 | End: 2022-08-16

## 2022-08-16 RX ORDER — CAFFEINE 200 MG
200 TABLET ORAL ONCE
Status: COMPLETED | OUTPATIENT
Start: 2022-08-16 | End: 2022-08-16

## 2022-08-16 RX ORDER — ACETAMINOPHEN 500 MG
1000 TABLET ORAL ONCE
Status: COMPLETED | OUTPATIENT
Start: 2022-08-16 | End: 2022-08-16

## 2022-08-16 RX ORDER — DIPHENHYDRAMINE HYDROCHLORIDE 50 MG/ML
25 INJECTION INTRAMUSCULAR; INTRAVENOUS ONCE
Status: DISCONTINUED | OUTPATIENT
Start: 2022-08-16 | End: 2022-08-16

## 2022-08-16 RX ORDER — DEXAMETHASONE SODIUM PHOSPHATE 10 MG/ML
10 INJECTION, SOLUTION INTRAMUSCULAR; INTRAVENOUS ONCE
Status: COMPLETED | OUTPATIENT
Start: 2022-08-16 | End: 2022-08-16

## 2022-08-16 RX ORDER — METOCLOPRAMIDE HYDROCHLORIDE 5 MG/ML
10 INJECTION INTRAMUSCULAR; INTRAVENOUS ONCE
Status: COMPLETED | OUTPATIENT
Start: 2022-08-16 | End: 2022-08-16

## 2022-08-16 RX ORDER — METOCLOPRAMIDE HYDROCHLORIDE 5 MG/ML
10 INJECTION INTRAMUSCULAR; INTRAVENOUS ONCE
Status: DISCONTINUED | OUTPATIENT
Start: 2022-08-16 | End: 2022-08-16

## 2022-08-16 RX ORDER — MAGNESIUM SULFATE 1 G/100ML
1 INJECTION INTRAVENOUS ONCE
Status: COMPLETED | OUTPATIENT
Start: 2022-08-16 | End: 2022-08-16

## 2022-08-16 RX ORDER — DIPHENHYDRAMINE HYDROCHLORIDE 50 MG/ML
50 INJECTION INTRAMUSCULAR; INTRAVENOUS ONCE
Status: COMPLETED | OUTPATIENT
Start: 2022-08-16 | End: 2022-08-16

## 2022-08-16 NOTE — ED INITIAL ASSESSMENT (HPI)
Pt has a history of migraines, epileptic seizures, non epileptic seizures, syncopal episodes. States today at work she had a brief syncopal episode and hit her head on the door. Also states her arms convulsed for about 5 seconds. No post ictal period. Pt  C/o now of 10/10 headache.

## 2022-08-17 LAB
ATRIAL RATE: 76 BPM
P AXIS: 28 DEGREES
P-R INTERVAL: 146 MS
Q-T INTERVAL: 370 MS
QRS DURATION: 88 MS
QTC CALCULATION (BEZET): 416 MS
R AXIS: -1 DEGREES
T AXIS: 35 DEGREES
VENTRICULAR RATE: 76 BPM

## 2022-08-21 ENCOUNTER — HOSPITAL ENCOUNTER (OUTPATIENT)
Age: 47
Discharge: ANOTHER HOSPITAL | End: 2022-08-21
Payer: MEDICARE

## 2022-08-21 ENCOUNTER — HOSPITAL ENCOUNTER (EMERGENCY)
Facility: HOSPITAL | Age: 47
Discharge: HOME | End: 2022-08-21
Attending: EMERGENCY MEDICINE
Payer: MEDICARE

## 2022-08-21 ENCOUNTER — APPOINTMENT (OUTPATIENT)
Dept: CT IMAGING | Facility: HOSPITAL | Age: 47
End: 2022-08-21
Attending: EMERGENCY MEDICINE
Payer: MEDICARE

## 2022-08-21 VITALS
TEMPERATURE: 98 F | HEIGHT: 67 IN | HEART RATE: 86 BPM | BODY MASS INDEX: 32.49 KG/M2 | DIASTOLIC BLOOD PRESSURE: 80 MMHG | WEIGHT: 207 LBS | OXYGEN SATURATION: 97 % | RESPIRATION RATE: 16 BRPM | SYSTOLIC BLOOD PRESSURE: 124 MMHG

## 2022-08-21 VITALS
DIASTOLIC BLOOD PRESSURE: 68 MMHG | OXYGEN SATURATION: 98 % | WEIGHT: 207 LBS | SYSTOLIC BLOOD PRESSURE: 105 MMHG | TEMPERATURE: 98 F | HEART RATE: 71 BPM | HEIGHT: 67 IN | RESPIRATION RATE: 14 BRPM | BODY MASS INDEX: 32.49 KG/M2

## 2022-08-21 DIAGNOSIS — N30.00 ACUTE CYSTITIS WITHOUT HEMATURIA: Primary | ICD-10-CM

## 2022-08-21 DIAGNOSIS — M54.50 LOW BACK PAIN AT MULTIPLE SITES: ICD-10-CM

## 2022-08-21 DIAGNOSIS — N83.202 LEFT OVARIAN CYST: ICD-10-CM

## 2022-08-21 DIAGNOSIS — R10.9 RIGHT FLANK PAIN: ICD-10-CM

## 2022-08-21 LAB
ALBUMIN SERPL-MCNC: 3.7 G/DL (ref 3.4–5)
ALBUMIN/GLOB SERPL: 1.1 {RATIO} (ref 1–2)
ALP LIVER SERPL-CCNC: 55 U/L
ALT SERPL-CCNC: 19 U/L
ANION GAP SERPL CALC-SCNC: 6 MMOL/L (ref 0–18)
AST SERPL-CCNC: 20 U/L (ref 15–37)
B-HCG UR QL: NEGATIVE
BASOPHILS # BLD AUTO: 0.02 X10(3) UL (ref 0–0.2)
BASOPHILS NFR BLD AUTO: 0.3 %
BILIRUB SERPL-MCNC: 0.2 MG/DL (ref 0.1–2)
BILIRUB UR QL STRIP.AUTO: NEGATIVE
BUN BLD-MCNC: 21 MG/DL (ref 7–18)
CALCIUM BLD-MCNC: 8.7 MG/DL (ref 8.5–10.1)
CHLORIDE SERPL-SCNC: 110 MMOL/L (ref 98–112)
CO2 SERPL-SCNC: 22 MMOL/L (ref 21–32)
COLOR UR AUTO: YELLOW
CREAT BLD-MCNC: 1.01 MG/DL
EOSINOPHIL # BLD AUTO: 0.09 X10(3) UL (ref 0–0.7)
EOSINOPHIL NFR BLD AUTO: 1.2 %
ERYTHROCYTE [DISTWIDTH] IN BLOOD BY AUTOMATED COUNT: 13.3 %
GFR SERPLBLD BASED ON 1.73 SQ M-ARVRAT: 70 ML/MIN/1.73M2 (ref 60–?)
GLOBULIN PLAS-MCNC: 3.5 G/DL (ref 2.8–4.4)
GLUCOSE BLD-MCNC: 98 MG/DL (ref 70–99)
GLUCOSE UR STRIP.AUTO-MCNC: NEGATIVE MG/DL
HCT VFR BLD AUTO: 41.4 %
HGB BLD-MCNC: 13.5 G/DL
IMM GRANULOCYTES # BLD AUTO: 0.03 X10(3) UL (ref 0–1)
IMM GRANULOCYTES NFR BLD: 0.4 %
KETONES UR STRIP.AUTO-MCNC: NEGATIVE MG/DL
LYMPHOCYTES # BLD AUTO: 2.08 X10(3) UL (ref 1–4)
LYMPHOCYTES NFR BLD AUTO: 28.3 %
MCH RBC QN AUTO: 30.1 PG (ref 26–34)
MCHC RBC AUTO-ENTMCNC: 32.6 G/DL (ref 31–37)
MCV RBC AUTO: 92.4 FL
MONOCYTES # BLD AUTO: 0.52 X10(3) UL (ref 0.1–1)
MONOCYTES NFR BLD AUTO: 7.1 %
NEUTROPHILS # BLD AUTO: 4.62 X10 (3) UL (ref 1.5–7.7)
NEUTROPHILS # BLD AUTO: 4.62 X10(3) UL (ref 1.5–7.7)
NEUTROPHILS NFR BLD AUTO: 62.7 %
NITRITE UR QL STRIP.AUTO: NEGATIVE
OSMOLALITY SERPL CALC.SUM OF ELEC: 289 MOSM/KG (ref 275–295)
PH UR STRIP.AUTO: 6 [PH] (ref 5–8)
PLATELET # BLD AUTO: 220 10(3)UL (ref 150–450)
POCT BILIRUBIN URINE: NEGATIVE
POCT BLOOD URINE: NEGATIVE
POCT GLUCOSE URINE: NEGATIVE MG/DL
POCT KETONE URINE: NEGATIVE MG/DL
POCT NITRITE URINE: NEGATIVE
POCT PH URINE: 6 (ref 5–8)
POCT PROTEIN URINE: NEGATIVE MG/DL
POCT SPECIFIC GRAVITY URINE: 1.03
POCT URINE COLOR: YELLOW
POCT UROBILINOGEN URINE: 0.2 MG/DL
POTASSIUM SERPL-SCNC: 3.4 MMOL/L (ref 3.5–5.1)
PROT SERPL-MCNC: 7.2 G/DL (ref 6.4–8.2)
PROT UR STRIP.AUTO-MCNC: NEGATIVE MG/DL
RBC # BLD AUTO: 4.48 X10(6)UL
RBC UR QL AUTO: NEGATIVE
SODIUM SERPL-SCNC: 138 MMOL/L (ref 136–145)
SP GR UR STRIP.AUTO: 1.02 (ref 1–1.03)
UROBILINOGEN UR STRIP.AUTO-MCNC: <2 MG/DL
WBC # BLD AUTO: 7.4 X10(3) UL (ref 4–11)

## 2022-08-21 PROCEDURE — 81002 URINALYSIS NONAUTO W/O SCOPE: CPT | Performed by: NURSE PRACTITIONER

## 2022-08-21 PROCEDURE — 87086 URINE CULTURE/COLONY COUNT: CPT | Performed by: EMERGENCY MEDICINE

## 2022-08-21 PROCEDURE — 74176 CT ABD & PELVIS W/O CONTRAST: CPT | Performed by: EMERGENCY MEDICINE

## 2022-08-21 PROCEDURE — 99284 EMERGENCY DEPT VISIT MOD MDM: CPT

## 2022-08-21 PROCEDURE — 96374 THER/PROPH/DIAG INJ IV PUSH: CPT

## 2022-08-21 PROCEDURE — 96361 HYDRATE IV INFUSION ADD-ON: CPT

## 2022-08-21 PROCEDURE — 99215 OFFICE O/P EST HI 40 MIN: CPT | Performed by: NURSE PRACTITIONER

## 2022-08-21 PROCEDURE — 85025 COMPLETE CBC W/AUTO DIFF WBC: CPT | Performed by: EMERGENCY MEDICINE

## 2022-08-21 PROCEDURE — 81025 URINE PREGNANCY TEST: CPT

## 2022-08-21 PROCEDURE — 80053 COMPREHEN METABOLIC PANEL: CPT | Performed by: EMERGENCY MEDICINE

## 2022-08-21 PROCEDURE — 81001 URINALYSIS AUTO W/SCOPE: CPT | Performed by: EMERGENCY MEDICINE

## 2022-08-21 RX ORDER — KETOROLAC TROMETHAMINE 15 MG/ML
15 INJECTION, SOLUTION INTRAMUSCULAR; INTRAVENOUS ONCE
Status: COMPLETED | OUTPATIENT
Start: 2022-08-21 | End: 2022-08-21

## 2022-08-21 RX ORDER — HYDROCODONE BITARTRATE AND ACETAMINOPHEN 5; 325 MG/1; MG/1
1-2 TABLET ORAL EVERY 6 HOURS PRN
Qty: 10 TABLET | Refills: 0 | Status: SHIPPED | OUTPATIENT
Start: 2022-08-21 | End: 2022-08-26

## 2022-08-21 RX ORDER — SULFAMETHOXAZOLE AND TRIMETHOPRIM 800; 160 MG/1; MG/1
1 TABLET ORAL 2 TIMES DAILY
Qty: 6 TABLET | Refills: 0 | Status: SHIPPED | OUTPATIENT
Start: 2022-08-21 | End: 2022-08-27

## 2022-08-21 NOTE — ED INITIAL ASSESSMENT (HPI)
Patient presents for burning with urination as well as right flank pain. Patient was seen here 6 days ago and treated for UTI with keflex without relief of symptoms. No fever.

## 2022-08-21 NOTE — ED INITIAL ASSESSMENT (HPI)
Being treated for a UTI for 2 weeks, c/o right flank pain x one week, sent from [address] [place] [address].

## 2022-09-26 ENCOUNTER — HOSPITAL ENCOUNTER (EMERGENCY)
Facility: HOSPITAL | Age: 47
Discharge: HOME | End: 2022-09-26
Attending: EMERGENCY MEDICINE

## 2022-09-26 ENCOUNTER — APPOINTMENT (OUTPATIENT)
Dept: CT IMAGING | Facility: HOSPITAL | Age: 47
End: 2022-09-26
Attending: EMERGENCY MEDICINE

## 2022-09-26 VITALS
SYSTOLIC BLOOD PRESSURE: 105 MMHG | DIASTOLIC BLOOD PRESSURE: 67 MMHG | TEMPERATURE: 99 F | HEART RATE: 58 BPM | OXYGEN SATURATION: 97 % | RESPIRATION RATE: 22 BRPM

## 2022-09-26 DIAGNOSIS — G40.909 SEIZURE DISORDER (HCC): Primary | ICD-10-CM

## 2022-09-26 DIAGNOSIS — M54.50 CHRONIC LOW BACK PAIN WITHOUT SCIATICA, UNSPECIFIED BACK PAIN LATERALITY: ICD-10-CM

## 2022-09-26 DIAGNOSIS — G89.29 CHRONIC LOW BACK PAIN WITHOUT SCIATICA, UNSPECIFIED BACK PAIN LATERALITY: ICD-10-CM

## 2022-09-26 LAB
ALBUMIN SERPL-MCNC: 3.8 G/DL (ref 3.4–5)
ALBUMIN/GLOB SERPL: 1.1 {RATIO} (ref 1–2)
ALP LIVER SERPL-CCNC: 52 U/L
ALT SERPL-CCNC: 19 U/L
AMPHET UR QL SCN: NEGATIVE
ANION GAP SERPL CALC-SCNC: 3 MMOL/L (ref 0–18)
AST SERPL-CCNC: 12 U/L (ref 15–37)
ATRIAL RATE: 70 BPM
BASOPHILS # BLD AUTO: 0.04 X10(3) UL (ref 0–0.2)
BASOPHILS NFR BLD AUTO: 0.7 %
BILIRUB SERPL-MCNC: 0.3 MG/DL (ref 0.1–2)
BILIRUB UR QL STRIP.AUTO: NEGATIVE
BUN BLD-MCNC: 22 MG/DL (ref 7–18)
CALCIUM BLD-MCNC: 8.6 MG/DL (ref 8.5–10.1)
CANNABINOIDS UR QL SCN: NEGATIVE
CHLORIDE SERPL-SCNC: 113 MMOL/L (ref 98–112)
CLARITY UR REFRACT.AUTO: CLEAR
CO2 SERPL-SCNC: 23 MMOL/L (ref 21–32)
COCAINE UR QL: NEGATIVE
COLOR UR AUTO: YELLOW
CREAT BLD-MCNC: 1.28 MG/DL
CREAT UR-SCNC: 105 MG/DL
EOSINOPHIL # BLD AUTO: 0.1 X10(3) UL (ref 0–0.7)
EOSINOPHIL NFR BLD AUTO: 1.6 %
ERYTHROCYTE [DISTWIDTH] IN BLOOD BY AUTOMATED COUNT: 13.2 %
ETHANOL SERPL-MCNC: <3 MG/DL (ref ?–3)
GFR SERPLBLD BASED ON 1.73 SQ M-ARVRAT: 52 ML/MIN/1.73M2 (ref 60–?)
GLOBULIN PLAS-MCNC: 3.5 G/DL (ref 2.8–4.4)
GLUCOSE BLD-MCNC: 83 MG/DL (ref 70–99)
GLUCOSE BLD-MCNC: 85 MG/DL (ref 70–99)
GLUCOSE UR STRIP.AUTO-MCNC: NEGATIVE MG/DL
HCT VFR BLD AUTO: 41.4 %
HGB BLD-MCNC: 13.3 G/DL
IMM GRANULOCYTES # BLD AUTO: 0.05 X10(3) UL (ref 0–1)
IMM GRANULOCYTES NFR BLD: 0.8 %
KETONES UR STRIP.AUTO-MCNC: NEGATIVE MG/DL
LIPASE SERPL-CCNC: 238 U/L (ref 73–393)
LYMPHOCYTES # BLD AUTO: 1.85 X10(3) UL (ref 1–4)
LYMPHOCYTES NFR BLD AUTO: 30.4 %
MCH RBC QN AUTO: 30 PG (ref 26–34)
MCHC RBC AUTO-ENTMCNC: 32.1 G/DL (ref 31–37)
MCV RBC AUTO: 93.5 FL
MDMA UR QL SCN: NEGATIVE
MONOCYTES # BLD AUTO: 0.48 X10(3) UL (ref 0.1–1)
MONOCYTES NFR BLD AUTO: 7.9 %
NEUTROPHILS # BLD AUTO: 3.57 X10 (3) UL (ref 1.5–7.7)
NEUTROPHILS # BLD AUTO: 3.57 X10(3) UL (ref 1.5–7.7)
NEUTROPHILS NFR BLD AUTO: 58.6 %
NITRITE UR QL STRIP.AUTO: NEGATIVE
OPIATES UR QL SCN: NEGATIVE
OSMOLALITY SERPL CALC.SUM OF ELEC: 291 MOSM/KG (ref 275–295)
P AXIS: 41 DEGREES
P-R INTERVAL: 158 MS
PH UR STRIP.AUTO: 5.5 [PH] (ref 5–8)
PLATELET # BLD AUTO: 205 10(3)UL (ref 150–450)
POTASSIUM SERPL-SCNC: 3.5 MMOL/L (ref 3.5–5.1)
PROT SERPL-MCNC: 7.3 G/DL (ref 6.4–8.2)
PROT UR STRIP.AUTO-MCNC: NEGATIVE MG/DL
Q-T INTERVAL: 394 MS
QRS DURATION: 86 MS
QTC CALCULATION (BEZET): 425 MS
R AXIS: -5 DEGREES
RBC # BLD AUTO: 4.43 X10(6)UL
RBC UR QL AUTO: NEGATIVE
SODIUM SERPL-SCNC: 139 MMOL/L (ref 136–145)
SP GR UR STRIP.AUTO: 1.02 (ref 1–1.03)
T AXIS: 11 DEGREES
UROBILINOGEN UR STRIP.AUTO-MCNC: 0.2 MG/DL
VENTRICULAR RATE: 70 BPM
WBC # BLD AUTO: 6.1 X10(3) UL (ref 4–11)

## 2022-09-26 PROCEDURE — 85025 COMPLETE CBC W/AUTO DIFF WBC: CPT | Performed by: EMERGENCY MEDICINE

## 2022-09-26 PROCEDURE — 82077 ASSAY SPEC XCP UR&BREATH IA: CPT | Performed by: EMERGENCY MEDICINE

## 2022-09-26 PROCEDURE — 81001 URINALYSIS AUTO W/SCOPE: CPT | Performed by: EMERGENCY MEDICINE

## 2022-09-26 PROCEDURE — 87086 URINE CULTURE/COLONY COUNT: CPT | Performed by: EMERGENCY MEDICINE

## 2022-09-26 PROCEDURE — 80053 COMPREHEN METABOLIC PANEL: CPT | Performed by: EMERGENCY MEDICINE

## 2022-09-26 PROCEDURE — 96374 THER/PROPH/DIAG INJ IV PUSH: CPT

## 2022-09-26 PROCEDURE — 93005 ELECTROCARDIOGRAM TRACING: CPT

## 2022-09-26 PROCEDURE — 96361 HYDRATE IV INFUSION ADD-ON: CPT

## 2022-09-26 PROCEDURE — 74176 CT ABD & PELVIS W/O CONTRAST: CPT | Performed by: EMERGENCY MEDICINE

## 2022-09-26 PROCEDURE — 93010 ELECTROCARDIOGRAM REPORT: CPT

## 2022-09-26 PROCEDURE — 81015 MICROSCOPIC EXAM OF URINE: CPT | Performed by: EMERGENCY MEDICINE

## 2022-09-26 PROCEDURE — 83690 ASSAY OF LIPASE: CPT | Performed by: EMERGENCY MEDICINE

## 2022-09-26 PROCEDURE — 80201 ASSAY OF TOPIRAMATE: CPT | Performed by: EMERGENCY MEDICINE

## 2022-09-26 PROCEDURE — 80175 DRUG SCREEN QUAN LAMOTRIGINE: CPT | Performed by: EMERGENCY MEDICINE

## 2022-09-26 PROCEDURE — 82962 GLUCOSE BLOOD TEST: CPT

## 2022-09-26 PROCEDURE — 99284 EMERGENCY DEPT VISIT MOD MDM: CPT

## 2022-09-26 PROCEDURE — 80307 DRUG TEST PRSMV CHEM ANLYZR: CPT | Performed by: EMERGENCY MEDICINE

## 2022-09-26 RX ORDER — HYDROCODONE BITARTRATE AND ACETAMINOPHEN 5; 325 MG/1; MG/1
1 TABLET ORAL EVERY 6 HOURS PRN
Qty: 5 TABLET | Refills: 0 | Status: SHIPPED | OUTPATIENT
Start: 2022-09-26 | End: 2022-10-01

## 2022-09-26 RX ORDER — HYDROMORPHONE HYDROCHLORIDE 1 MG/ML
0.5 INJECTION, SOLUTION INTRAMUSCULAR; INTRAVENOUS; SUBCUTANEOUS ONCE
Status: COMPLETED | OUTPATIENT
Start: 2022-09-26 | End: 2022-09-26

## 2022-09-26 NOTE — ED QUICK NOTES
Pt awake, alert  w regular, non-labored respirations. Reports pain tolerable at this time. VSS.  Siderails up x 2 w call light w/in reach

## 2022-09-26 NOTE — ED INITIAL ASSESSMENT (HPI)
Pt in per EMS from her work  3 seizures at work did not hit head  Has hx seizures on meds  On arrival groggy but answering questions an coming around   Was postictal with EMS w/confusion now A&Ox3 also states that she has bilaterral flank pain recent UTI and feels the pain has been causing he seizures

## 2022-09-28 LAB
LAMOTRIGINE: 7.1 UG/ML
TOPIRAMATE: 13 UG/ML

## 2022-10-19 ENCOUNTER — HOSPITAL ENCOUNTER (EMERGENCY)
Facility: HOSPITAL | Age: 47
Discharge: HOME | End: 2022-10-19
Attending: EMERGENCY MEDICINE
Payer: MEDICARE

## 2022-10-19 VITALS
TEMPERATURE: 99 F | SYSTOLIC BLOOD PRESSURE: 97 MMHG | OXYGEN SATURATION: 98 % | HEART RATE: 65 BPM | RESPIRATION RATE: 22 BRPM | DIASTOLIC BLOOD PRESSURE: 54 MMHG

## 2022-10-19 DIAGNOSIS — G43.809 OTHER MIGRAINE WITHOUT STATUS MIGRAINOSUS, NOT INTRACTABLE: ICD-10-CM

## 2022-10-19 DIAGNOSIS — G40.909 RECURRENT SEIZURES (HCC): Primary | ICD-10-CM

## 2022-10-19 PROCEDURE — 99284 EMERGENCY DEPT VISIT MOD MDM: CPT

## 2022-10-19 PROCEDURE — 96374 THER/PROPH/DIAG INJ IV PUSH: CPT

## 2022-10-19 PROCEDURE — 96375 TX/PRO/DX INJ NEW DRUG ADDON: CPT

## 2022-10-19 RX ORDER — KETOROLAC TROMETHAMINE 15 MG/ML
15 INJECTION, SOLUTION INTRAMUSCULAR; INTRAVENOUS ONCE
Status: COMPLETED | OUTPATIENT
Start: 2022-10-19 | End: 2022-10-19

## 2022-10-19 RX ORDER — ACETAMINOPHEN 500 MG
1000 TABLET ORAL ONCE
Status: COMPLETED | OUTPATIENT
Start: 2022-10-19 | End: 2022-10-19

## 2022-10-19 RX ORDER — DIPHENHYDRAMINE HYDROCHLORIDE 50 MG/ML
25 INJECTION INTRAMUSCULAR; INTRAVENOUS ONCE
Status: COMPLETED | OUTPATIENT
Start: 2022-10-19 | End: 2022-10-19

## 2022-10-19 RX ORDER — METOCLOPRAMIDE HYDROCHLORIDE 5 MG/ML
10 INJECTION INTRAMUSCULAR; INTRAVENOUS ONCE
Status: COMPLETED | OUTPATIENT
Start: 2022-10-19 | End: 2022-10-19

## 2022-10-19 RX ORDER — DEXAMETHASONE SODIUM PHOSPHATE 10 MG/ML
10 INJECTION, SOLUTION INTRAMUSCULAR; INTRAVENOUS ONCE
Status: COMPLETED | OUTPATIENT
Start: 2022-10-19 | End: 2022-10-19

## 2022-10-19 NOTE — ED INITIAL ASSESSMENT (HPI)
Patient to the ER c/o seizure and headache. Patient had two seizures today. One at work and one in route to ER. Patient is alert and oriented x4. Patient reports for the last 4 days she has been having a headache that will not go away.  No n/v Patient reports diarrhea no fevers

## 2022-11-22 ENCOUNTER — HOSPITAL ENCOUNTER (EMERGENCY)
Facility: HOSPITAL | Age: 47
Discharge: HOME | End: 2022-11-22
Attending: EMERGENCY MEDICINE
Payer: MEDICARE

## 2022-11-22 ENCOUNTER — APPOINTMENT (OUTPATIENT)
Dept: CT IMAGING | Facility: HOSPITAL | Age: 47
End: 2022-11-22
Attending: EMERGENCY MEDICINE
Payer: MEDICARE

## 2022-11-22 ENCOUNTER — APPOINTMENT (OUTPATIENT)
Dept: GENERAL RADIOLOGY | Facility: HOSPITAL | Age: 47
End: 2022-11-22
Attending: EMERGENCY MEDICINE
Payer: MEDICARE

## 2022-11-22 VITALS
WEIGHT: 213 LBS | BODY MASS INDEX: 33.43 KG/M2 | RESPIRATION RATE: 16 BRPM | TEMPERATURE: 98 F | DIASTOLIC BLOOD PRESSURE: 71 MMHG | OXYGEN SATURATION: 100 % | HEART RATE: 59 BPM | HEIGHT: 67 IN | SYSTOLIC BLOOD PRESSURE: 103 MMHG

## 2022-11-22 DIAGNOSIS — M25.512 ACUTE PAIN OF LEFT SHOULDER: ICD-10-CM

## 2022-11-22 DIAGNOSIS — R56.9 SEIZURE (HCC): Primary | ICD-10-CM

## 2022-11-22 LAB
ALBUMIN SERPL-MCNC: 3.7 G/DL (ref 3.4–5)
ALBUMIN/GLOB SERPL: 1 {RATIO} (ref 1–2)
ALP LIVER SERPL-CCNC: 50 U/L
ALT SERPL-CCNC: 17 U/L
ANION GAP SERPL CALC-SCNC: 6 MMOL/L (ref 0–18)
AST SERPL-CCNC: 8 U/L (ref 15–37)
BASOPHILS # BLD AUTO: 0.03 X10(3) UL (ref 0–0.2)
BASOPHILS NFR BLD AUTO: 0.5 %
BILIRUB SERPL-MCNC: 0.3 MG/DL (ref 0.1–2)
BUN BLD-MCNC: 25 MG/DL (ref 7–18)
CALCIUM BLD-MCNC: 8.8 MG/DL (ref 8.5–10.1)
CHLORIDE SERPL-SCNC: 114 MMOL/L (ref 98–112)
CO2 SERPL-SCNC: 21 MMOL/L (ref 21–32)
CREAT BLD-MCNC: 1.29 MG/DL
EOSINOPHIL # BLD AUTO: 0.04 X10(3) UL (ref 0–0.7)
EOSINOPHIL NFR BLD AUTO: 0.6 %
ERYTHROCYTE [DISTWIDTH] IN BLOOD BY AUTOMATED COUNT: 13.2 %
GFR SERPLBLD BASED ON 1.73 SQ M-ARVRAT: 52 ML/MIN/1.73M2 (ref 60–?)
GLOBULIN PLAS-MCNC: 3.7 G/DL (ref 2.8–4.4)
GLUCOSE BLD-MCNC: 91 MG/DL (ref 70–99)
HCT VFR BLD AUTO: 41.3 %
HGB BLD-MCNC: 13.5 G/DL
IMM GRANULOCYTES # BLD AUTO: 0.07 X10(3) UL (ref 0–1)
IMM GRANULOCYTES NFR BLD: 1.1 %
LYMPHOCYTES # BLD AUTO: 1.59 X10(3) UL (ref 1–4)
LYMPHOCYTES NFR BLD AUTO: 25.1 %
MCH RBC QN AUTO: 30.3 PG (ref 26–34)
MCHC RBC AUTO-ENTMCNC: 32.7 G/DL (ref 31–37)
MCV RBC AUTO: 92.6 FL
MONOCYTES # BLD AUTO: 0.45 X10(3) UL (ref 0.1–1)
MONOCYTES NFR BLD AUTO: 7.1 %
NEUTROPHILS # BLD AUTO: 4.15 X10 (3) UL (ref 1.5–7.7)
NEUTROPHILS # BLD AUTO: 4.15 X10(3) UL (ref 1.5–7.7)
NEUTROPHILS NFR BLD AUTO: 65.6 %
OSMOLALITY SERPL CALC.SUM OF ELEC: 296 MOSM/KG (ref 275–295)
PLATELET # BLD AUTO: 224 10(3)UL (ref 150–450)
POTASSIUM SERPL-SCNC: 3.6 MMOL/L (ref 3.5–5.1)
PROT SERPL-MCNC: 7.4 G/DL (ref 6.4–8.2)
RBC # BLD AUTO: 4.46 X10(6)UL
SARS-COV-2 RNA RESP QL NAA+PROBE: NOT DETECTED
SODIUM SERPL-SCNC: 141 MMOL/L (ref 136–145)
WBC # BLD AUTO: 6.3 X10(3) UL (ref 4–11)

## 2022-11-22 PROCEDURE — 99284 EMERGENCY DEPT VISIT MOD MDM: CPT

## 2022-11-22 PROCEDURE — 72125 CT NECK SPINE W/O DYE: CPT | Performed by: EMERGENCY MEDICINE

## 2022-11-22 PROCEDURE — 70450 CT HEAD/BRAIN W/O DYE: CPT | Performed by: EMERGENCY MEDICINE

## 2022-11-22 PROCEDURE — 96374 THER/PROPH/DIAG INJ IV PUSH: CPT

## 2022-11-22 PROCEDURE — 80053 COMPREHEN METABOLIC PANEL: CPT | Performed by: EMERGENCY MEDICINE

## 2022-11-22 PROCEDURE — 73030 X-RAY EXAM OF SHOULDER: CPT | Performed by: EMERGENCY MEDICINE

## 2022-11-22 PROCEDURE — 85025 COMPLETE CBC W/AUTO DIFF WBC: CPT | Performed by: EMERGENCY MEDICINE

## 2022-11-22 RX ORDER — HYDROCODONE BITARTRATE AND ACETAMINOPHEN 5; 325 MG/1; MG/1
2 TABLET ORAL ONCE
Status: COMPLETED | OUTPATIENT
Start: 2022-11-22 | End: 2022-11-22

## 2022-11-22 RX ORDER — MORPHINE SULFATE 4 MG/ML
4 INJECTION, SOLUTION INTRAMUSCULAR; INTRAVENOUS ONCE
Status: COMPLETED | OUTPATIENT
Start: 2022-11-22 | End: 2022-11-22

## 2022-11-22 RX ORDER — LAMOTRIGINE 100 MG/1
200 TABLET ORAL ONCE
Status: COMPLETED | OUTPATIENT
Start: 2022-11-22 | End: 2022-11-22

## 2022-11-22 RX ORDER — HYDROCODONE BITARTRATE AND ACETAMINOPHEN 5; 325 MG/1; MG/1
1-2 TABLET ORAL EVERY 6 HOURS PRN
Qty: 14 TABLET | Refills: 0 | Status: SHIPPED | OUTPATIENT
Start: 2022-11-22 | End: 2022-11-29

## 2022-11-22 NOTE — ED INITIAL ASSESSMENT (HPI)
Hx of epilepsy, had seizure while walking to neighbor's house, given versed by EMS, c/o left shoulder pain.

## 2022-12-21 ENCOUNTER — HOSPITAL ENCOUNTER (EMERGENCY)
Facility: HOSPITAL | Age: 47
Discharge: HOME | End: 2022-12-21
Attending: EMERGENCY MEDICINE
Payer: MEDICARE

## 2022-12-21 VITALS
DIASTOLIC BLOOD PRESSURE: 66 MMHG | OXYGEN SATURATION: 97 % | RESPIRATION RATE: 19 BRPM | HEART RATE: 82 BPM | WEIGHT: 215 LBS | HEIGHT: 67 IN | BODY MASS INDEX: 33.74 KG/M2 | SYSTOLIC BLOOD PRESSURE: 103 MMHG

## 2022-12-21 DIAGNOSIS — G43.909 MIGRAINE WITHOUT STATUS MIGRAINOSUS, NOT INTRACTABLE, UNSPECIFIED MIGRAINE TYPE: Primary | ICD-10-CM

## 2022-12-21 DIAGNOSIS — E16.2 HYPOGLYCEMIA: ICD-10-CM

## 2022-12-21 LAB
ALBUMIN SERPL-MCNC: 3.4 G/DL (ref 3.4–5)
ALBUMIN/GLOB SERPL: 1.1 {RATIO} (ref 1–2)
ALP LIVER SERPL-CCNC: 49 U/L
ALT SERPL-CCNC: 21 U/L
ANION GAP SERPL CALC-SCNC: 2 MMOL/L (ref 0–18)
AST SERPL-CCNC: 12 U/L (ref 15–37)
ATRIAL RATE: 73 BPM
BASOPHILS # BLD AUTO: 0.02 X10(3) UL (ref 0–0.2)
BASOPHILS NFR BLD AUTO: 0.4 %
BILIRUB SERPL-MCNC: 0.2 MG/DL (ref 0.1–2)
BUN BLD-MCNC: 22 MG/DL (ref 7–18)
CALCIUM BLD-MCNC: 8.3 MG/DL (ref 8.5–10.1)
CHLORIDE SERPL-SCNC: 116 MMOL/L (ref 98–112)
CO2 SERPL-SCNC: 22 MMOL/L (ref 21–32)
CREAT BLD-MCNC: 1.06 MG/DL
EOSINOPHIL # BLD AUTO: 0.13 X10(3) UL (ref 0–0.7)
EOSINOPHIL NFR BLD AUTO: 2.7 %
ERYTHROCYTE [DISTWIDTH] IN BLOOD BY AUTOMATED COUNT: 13.4 %
GFR SERPLBLD BASED ON 1.73 SQ M-ARVRAT: 66 ML/MIN/1.73M2 (ref 60–?)
GLOBULIN PLAS-MCNC: 3.1 G/DL (ref 2.8–4.4)
GLUCOSE BLD-MCNC: 100 MG/DL (ref 70–99)
GLUCOSE BLD-MCNC: 106 MG/DL (ref 70–99)
GLUCOSE BLD-MCNC: 124 MG/DL (ref 70–99)
GLUCOSE BLD-MCNC: 179 MG/DL (ref 70–99)
HCT VFR BLD AUTO: 38.6 %
HGB BLD-MCNC: 12.6 G/DL
IMM GRANULOCYTES # BLD AUTO: 0.03 X10(3) UL (ref 0–1)
IMM GRANULOCYTES NFR BLD: 0.6 %
LYMPHOCYTES # BLD AUTO: 1.28 X10(3) UL (ref 1–4)
LYMPHOCYTES NFR BLD AUTO: 26.9 %
MCH RBC QN AUTO: 30.3 PG (ref 26–34)
MCHC RBC AUTO-ENTMCNC: 32.6 G/DL (ref 31–37)
MCV RBC AUTO: 92.8 FL
MONOCYTES # BLD AUTO: 0.25 X10(3) UL (ref 0.1–1)
MONOCYTES NFR BLD AUTO: 5.3 %
NEUTROPHILS # BLD AUTO: 3.04 X10 (3) UL (ref 1.5–7.7)
NEUTROPHILS # BLD AUTO: 3.04 X10(3) UL (ref 1.5–7.7)
NEUTROPHILS NFR BLD AUTO: 64.1 %
OSMOLALITY SERPL CALC.SUM OF ELEC: 295 MOSM/KG (ref 275–295)
P AXIS: 51 DEGREES
P-R INTERVAL: 164 MS
PLATELET # BLD AUTO: 191 10(3)UL (ref 150–450)
POTASSIUM SERPL-SCNC: 3.4 MMOL/L (ref 3.5–5.1)
PROT SERPL-MCNC: 6.5 G/DL (ref 6.4–8.2)
Q-T INTERVAL: 390 MS
QRS DURATION: 90 MS
QTC CALCULATION (BEZET): 429 MS
R AXIS: -7 DEGREES
RBC # BLD AUTO: 4.16 X10(6)UL
SODIUM SERPL-SCNC: 140 MMOL/L (ref 136–145)
T AXIS: 24 DEGREES
VENTRICULAR RATE: 73 BPM
WBC # BLD AUTO: 4.8 X10(3) UL (ref 4–11)

## 2022-12-21 PROCEDURE — 99285 EMERGENCY DEPT VISIT HI MDM: CPT

## 2022-12-21 PROCEDURE — 82962 GLUCOSE BLOOD TEST: CPT

## 2022-12-21 PROCEDURE — 80053 COMPREHEN METABOLIC PANEL: CPT | Performed by: EMERGENCY MEDICINE

## 2022-12-21 PROCEDURE — 99284 EMERGENCY DEPT VISIT MOD MDM: CPT

## 2022-12-21 PROCEDURE — 93010 ELECTROCARDIOGRAM REPORT: CPT

## 2022-12-21 PROCEDURE — 96375 TX/PRO/DX INJ NEW DRUG ADDON: CPT

## 2022-12-21 PROCEDURE — 85025 COMPLETE CBC W/AUTO DIFF WBC: CPT | Performed by: EMERGENCY MEDICINE

## 2022-12-21 PROCEDURE — 93005 ELECTROCARDIOGRAM TRACING: CPT

## 2022-12-21 PROCEDURE — 96374 THER/PROPH/DIAG INJ IV PUSH: CPT

## 2022-12-21 PROCEDURE — 96376 TX/PRO/DX INJ SAME DRUG ADON: CPT

## 2022-12-21 RX ORDER — TOPIRAMATE 100 MG/1
200 TABLET, FILM COATED ORAL EVERY 12 HOURS SCHEDULED
Status: DISCONTINUED | OUTPATIENT
Start: 2022-12-21 | End: 2022-12-21

## 2022-12-21 RX ORDER — KETOROLAC TROMETHAMINE 15 MG/ML
15 INJECTION, SOLUTION INTRAMUSCULAR; INTRAVENOUS ONCE
Status: COMPLETED | OUTPATIENT
Start: 2022-12-21 | End: 2022-12-21

## 2022-12-21 RX ORDER — METOCLOPRAMIDE HYDROCHLORIDE 5 MG/ML
10 INJECTION INTRAMUSCULAR; INTRAVENOUS ONCE
Status: COMPLETED | OUTPATIENT
Start: 2022-12-21 | End: 2022-12-21

## 2022-12-21 RX ORDER — LAMOTRIGINE 100 MG/1
200 TABLET ORAL DAILY
Status: DISCONTINUED | OUTPATIENT
Start: 2022-12-21 | End: 2022-12-21

## 2022-12-21 RX ORDER — DIPHENHYDRAMINE HYDROCHLORIDE 50 MG/ML
25 INJECTION INTRAMUSCULAR; INTRAVENOUS ONCE
Status: COMPLETED | OUTPATIENT
Start: 2022-12-21 | End: 2022-12-21

## 2022-12-21 RX ORDER — DEXAMETHASONE SODIUM PHOSPHATE 10 MG/ML
10 INJECTION, SOLUTION INTRAMUSCULAR; INTRAVENOUS ONCE
Status: COMPLETED | OUTPATIENT
Start: 2022-12-21 | End: 2022-12-21

## 2022-12-21 NOTE — ED INITIAL ASSESSMENT (HPI)
Pt states she has had a migraine x 3 days, did not take her seizure medications yet this morning, felt an aura coming on and tried to lower herself to the ground.  When EMS arrived pts sugar was 44, she denies ever having hypoglycemia in the past.

## 2023-01-04 ENCOUNTER — HOSPITAL ENCOUNTER (EMERGENCY)
Facility: HOSPITAL | Age: 48
Discharge: HOME | End: 2023-01-04
Attending: EMERGENCY MEDICINE
Payer: MEDICARE

## 2023-01-04 ENCOUNTER — APPOINTMENT (OUTPATIENT)
Dept: CT IMAGING | Facility: HOSPITAL | Age: 48
End: 2023-01-04
Attending: EMERGENCY MEDICINE
Payer: MEDICARE

## 2023-01-04 VITALS
DIASTOLIC BLOOD PRESSURE: 45 MMHG | SYSTOLIC BLOOD PRESSURE: 104 MMHG | TEMPERATURE: 99 F | HEIGHT: 67 IN | HEART RATE: 83 BPM | RESPIRATION RATE: 20 BRPM | WEIGHT: 216 LBS | BODY MASS INDEX: 33.9 KG/M2 | OXYGEN SATURATION: 100 %

## 2023-01-04 DIAGNOSIS — S09.90XA MINOR HEAD INJURY, INITIAL ENCOUNTER: ICD-10-CM

## 2023-01-04 DIAGNOSIS — G40.909 SEIZURE DISORDER (HCC): Primary | ICD-10-CM

## 2023-01-04 DIAGNOSIS — G43.109 MIGRAINE WITH AURA AND WITHOUT STATUS MIGRAINOSUS, NOT INTRACTABLE: ICD-10-CM

## 2023-01-04 LAB
ALBUMIN SERPL-MCNC: 3.5 G/DL (ref 3.4–5)
ALBUMIN/GLOB SERPL: 0.9 {RATIO} (ref 1–2)
ALP LIVER SERPL-CCNC: 50 U/L
ALT SERPL-CCNC: 19 U/L
ANION GAP SERPL CALC-SCNC: 7 MMOL/L (ref 0–18)
AST SERPL-CCNC: 14 U/L (ref 15–37)
BASOPHILS # BLD AUTO: 0.02 X10(3) UL (ref 0–0.2)
BASOPHILS NFR BLD AUTO: 0.3 %
BILIRUB SERPL-MCNC: 0.2 MG/DL (ref 0.1–2)
BUN BLD-MCNC: 15 MG/DL (ref 7–18)
CALCIUM BLD-MCNC: 8.4 MG/DL (ref 8.5–10.1)
CHLORIDE SERPL-SCNC: 114 MMOL/L (ref 98–112)
CO2 SERPL-SCNC: 17 MMOL/L (ref 21–32)
CREAT BLD-MCNC: 1.12 MG/DL
EOSINOPHIL # BLD AUTO: 0.14 X10(3) UL (ref 0–0.7)
EOSINOPHIL NFR BLD AUTO: 2 %
ERYTHROCYTE [DISTWIDTH] IN BLOOD BY AUTOMATED COUNT: 13.2 %
GFR SERPLBLD BASED ON 1.73 SQ M-ARVRAT: 61 ML/MIN/1.73M2 (ref 60–?)
GLOBULIN PLAS-MCNC: 3.7 G/DL (ref 2.8–4.4)
GLUCOSE BLD-MCNC: 122 MG/DL (ref 70–99)
HCT VFR BLD AUTO: 39.1 %
HGB BLD-MCNC: 13.1 G/DL
IMM GRANULOCYTES # BLD AUTO: 0.08 X10(3) UL (ref 0–1)
IMM GRANULOCYTES NFR BLD: 1.2 %
LYMPHOCYTES # BLD AUTO: 1.59 X10(3) UL (ref 1–4)
LYMPHOCYTES NFR BLD AUTO: 23.1 %
MCH RBC QN AUTO: 30.7 PG (ref 26–34)
MCHC RBC AUTO-ENTMCNC: 33.5 G/DL (ref 31–37)
MCV RBC AUTO: 91.6 FL
MONOCYTES # BLD AUTO: 0.5 X10(3) UL (ref 0.1–1)
MONOCYTES NFR BLD AUTO: 7.3 %
NEUTROPHILS # BLD AUTO: 4.56 X10 (3) UL (ref 1.5–7.7)
NEUTROPHILS # BLD AUTO: 4.56 X10(3) UL (ref 1.5–7.7)
NEUTROPHILS NFR BLD AUTO: 66.1 %
OSMOLALITY SERPL CALC.SUM OF ELEC: 288 MOSM/KG (ref 275–295)
PHOSPHATE SERPL-MCNC: 2.1 MG/DL (ref 2.5–4.9)
PLATELET # BLD AUTO: 246 10(3)UL (ref 150–450)
POTASSIUM SERPL-SCNC: 3.6 MMOL/L (ref 3.5–5.1)
PROT SERPL-MCNC: 7.2 G/DL (ref 6.4–8.2)
RBC # BLD AUTO: 4.27 X10(6)UL
SODIUM SERPL-SCNC: 138 MMOL/L (ref 136–145)
WBC # BLD AUTO: 6.9 X10(3) UL (ref 4–11)

## 2023-01-04 PROCEDURE — 96361 HYDRATE IV INFUSION ADD-ON: CPT

## 2023-01-04 PROCEDURE — 85025 COMPLETE CBC W/AUTO DIFF WBC: CPT | Performed by: EMERGENCY MEDICINE

## 2023-01-04 PROCEDURE — 96374 THER/PROPH/DIAG INJ IV PUSH: CPT

## 2023-01-04 PROCEDURE — 80175 DRUG SCREEN QUAN LAMOTRIGINE: CPT | Performed by: EMERGENCY MEDICINE

## 2023-01-04 PROCEDURE — 70450 CT HEAD/BRAIN W/O DYE: CPT | Performed by: EMERGENCY MEDICINE

## 2023-01-04 PROCEDURE — 99285 EMERGENCY DEPT VISIT HI MDM: CPT

## 2023-01-04 PROCEDURE — 96375 TX/PRO/DX INJ NEW DRUG ADDON: CPT

## 2023-01-04 PROCEDURE — 84100 ASSAY OF PHOSPHORUS: CPT | Performed by: EMERGENCY MEDICINE

## 2023-01-04 PROCEDURE — 80053 COMPREHEN METABOLIC PANEL: CPT | Performed by: EMERGENCY MEDICINE

## 2023-01-04 RX ORDER — LORAZEPAM 2 MG/ML
INJECTION INTRAMUSCULAR
Status: COMPLETED
Start: 2023-01-04 | End: 2023-01-04

## 2023-01-04 RX ORDER — KETOROLAC TROMETHAMINE 15 MG/ML
15 INJECTION, SOLUTION INTRAMUSCULAR; INTRAVENOUS ONCE
Status: COMPLETED | OUTPATIENT
Start: 2023-01-04 | End: 2023-01-04

## 2023-01-04 RX ORDER — LORAZEPAM 2 MG/ML
1 INJECTION INTRAMUSCULAR ONCE
Status: COMPLETED | OUTPATIENT
Start: 2023-01-04 | End: 2023-01-04

## 2023-01-04 RX ORDER — DIPHENHYDRAMINE HYDROCHLORIDE 50 MG/ML
50 INJECTION INTRAMUSCULAR; INTRAVENOUS ONCE
Status: COMPLETED | OUTPATIENT
Start: 2023-01-04 | End: 2023-01-04

## 2023-01-04 RX ORDER — METOCLOPRAMIDE HYDROCHLORIDE 5 MG/ML
10 INJECTION INTRAMUSCULAR; INTRAVENOUS ONCE
Status: COMPLETED | OUTPATIENT
Start: 2023-01-04 | End: 2023-01-04

## 2023-01-04 NOTE — ED INITIAL ASSESSMENT (HPI)
PT was walking up at most 3 carpeted stairs when she had a seizure, and fell. Seizure and fall was unwitnessed. PT was found at landing of stairs. PT has hx of epilepsy. Denies blood thinner use.

## 2023-01-06 LAB — LAMOTRIGINE: 3 UG/ML

## 2023-01-18 ENCOUNTER — HOSPITAL ENCOUNTER (EMERGENCY)
Facility: HOSPITAL | Age: 48
Discharge: HOME | End: 2023-01-18
Attending: EMERGENCY MEDICINE
Payer: MEDICARE

## 2023-01-18 VITALS
HEART RATE: 76 BPM | SYSTOLIC BLOOD PRESSURE: 100 MMHG | OXYGEN SATURATION: 94 % | WEIGHT: 215 LBS | BODY MASS INDEX: 33.74 KG/M2 | HEIGHT: 67 IN | TEMPERATURE: 99 F | DIASTOLIC BLOOD PRESSURE: 65 MMHG | RESPIRATION RATE: 20 BRPM

## 2023-01-18 DIAGNOSIS — G40.909 SEIZURE DISORDER (HCC): Primary | ICD-10-CM

## 2023-01-18 LAB
ANION GAP SERPL CALC-SCNC: 3 MMOL/L (ref 0–18)
ATRIAL RATE: 78 BPM
BUN BLD-MCNC: 20 MG/DL (ref 7–18)
CALCIUM BLD-MCNC: 8.4 MG/DL (ref 8.5–10.1)
CHLORIDE SERPL-SCNC: 113 MMOL/L (ref 98–112)
CO2 SERPL-SCNC: 21 MMOL/L (ref 21–32)
CREAT BLD-MCNC: 1.28 MG/DL
GFR SERPLBLD BASED ON 1.73 SQ M-ARVRAT: 52 ML/MIN/1.73M2 (ref 60–?)
GLUCOSE BLD-MCNC: 122 MG/DL (ref 70–99)
OSMOLALITY SERPL CALC.SUM OF ELEC: 288 MOSM/KG (ref 275–295)
P AXIS: 33 DEGREES
P-R INTERVAL: 162 MS
POTASSIUM SERPL-SCNC: 4.2 MMOL/L (ref 3.5–5.1)
Q-T INTERVAL: 392 MS
QRS DURATION: 92 MS
QTC CALCULATION (BEZET): 446 MS
R AXIS: -9 DEGREES
SODIUM SERPL-SCNC: 137 MMOL/L (ref 136–145)
T AXIS: 14 DEGREES
VENTRICULAR RATE: 78 BPM

## 2023-01-18 PROCEDURE — 96361 HYDRATE IV INFUSION ADD-ON: CPT

## 2023-01-18 PROCEDURE — 99284 EMERGENCY DEPT VISIT MOD MDM: CPT

## 2023-01-18 PROCEDURE — 93010 ELECTROCARDIOGRAM REPORT: CPT

## 2023-01-18 PROCEDURE — 93005 ELECTROCARDIOGRAM TRACING: CPT

## 2023-01-18 PROCEDURE — 96374 THER/PROPH/DIAG INJ IV PUSH: CPT

## 2023-01-18 PROCEDURE — 80175 DRUG SCREEN QUAN LAMOTRIGINE: CPT | Performed by: EMERGENCY MEDICINE

## 2023-01-18 PROCEDURE — 96375 TX/PRO/DX INJ NEW DRUG ADDON: CPT

## 2023-01-18 PROCEDURE — 80048 BASIC METABOLIC PNL TOTAL CA: CPT | Performed by: EMERGENCY MEDICINE

## 2023-01-18 RX ORDER — KETOROLAC TROMETHAMINE 15 MG/ML
15 INJECTION, SOLUTION INTRAMUSCULAR; INTRAVENOUS ONCE
Status: COMPLETED | OUTPATIENT
Start: 2023-01-18 | End: 2023-01-18

## 2023-01-18 RX ORDER — METOCLOPRAMIDE HYDROCHLORIDE 5 MG/ML
10 INJECTION INTRAMUSCULAR; INTRAVENOUS ONCE
Status: COMPLETED | OUTPATIENT
Start: 2023-01-18 | End: 2023-01-18

## 2023-01-18 RX ORDER — DIPHENHYDRAMINE HYDROCHLORIDE 50 MG/ML
25 INJECTION INTRAMUSCULAR; INTRAVENOUS ONCE
Status: COMPLETED | OUTPATIENT
Start: 2023-01-18 | End: 2023-01-18

## 2023-01-18 NOTE — ED INITIAL ASSESSMENT (HPI)
Patient arrived via EMS and had a seizure today while she was working out. Standing on treadmill. Witnessed. Unsure how long it lasted. Pt has hx of seizures. No post ictal- just tired and migraine. Pt sts has been up since 2 am and photosensitivity.

## 2023-01-19 LAB — LAMOTRIGINE: 6 UG/ML

## 2023-03-20 ENCOUNTER — HOSPITAL ENCOUNTER (EMERGENCY)
Facility: HOSPITAL | Age: 48
Discharge: HOME | End: 2023-03-20
Attending: EMERGENCY MEDICINE
Payer: MEDICARE

## 2023-03-20 VITALS
HEART RATE: 74 BPM | OXYGEN SATURATION: 99 % | WEIGHT: 196 LBS | RESPIRATION RATE: 16 BRPM | SYSTOLIC BLOOD PRESSURE: 110 MMHG | HEIGHT: 67 IN | BODY MASS INDEX: 30.76 KG/M2 | TEMPERATURE: 98 F | DIASTOLIC BLOOD PRESSURE: 63 MMHG

## 2023-03-20 DIAGNOSIS — G43.109 MIGRAINE WITH AURA AND WITHOUT STATUS MIGRAINOSUS, NOT INTRACTABLE: Primary | ICD-10-CM

## 2023-03-20 DIAGNOSIS — G40.909 SEIZURE DISORDER (HCC): ICD-10-CM

## 2023-03-20 LAB
ALBUMIN SERPL-MCNC: 3.9 G/DL (ref 3.4–5)
ALBUMIN/GLOB SERPL: 1.2 {RATIO} (ref 1–2)
ALP LIVER SERPL-CCNC: 42 U/L
ALT SERPL-CCNC: 38 U/L
ANION GAP SERPL CALC-SCNC: 8 MMOL/L (ref 0–18)
AST SERPL-CCNC: 21 U/L (ref 15–37)
B-HCG UR QL: NEGATIVE
BASOPHILS # BLD AUTO: 0.02 X10(3) UL (ref 0–0.2)
BASOPHILS NFR BLD AUTO: 0.4 %
BILIRUB SERPL-MCNC: 0.3 MG/DL (ref 0.1–2)
BILIRUB UR QL STRIP.AUTO: NEGATIVE
BUN BLD-MCNC: 22 MG/DL (ref 7–18)
CALCIUM BLD-MCNC: 8.9 MG/DL (ref 8.5–10.1)
CHLORIDE SERPL-SCNC: 109 MMOL/L (ref 98–112)
CLARITY UR REFRACT.AUTO: CLEAR
CO2 SERPL-SCNC: 22 MMOL/L (ref 21–32)
CREAT BLD-MCNC: 1.14 MG/DL
EOSINOPHIL # BLD AUTO: 0.05 X10(3) UL (ref 0–0.7)
EOSINOPHIL NFR BLD AUTO: 1.1 %
ERYTHROCYTE [DISTWIDTH] IN BLOOD BY AUTOMATED COUNT: 13.7 %
GFR SERPLBLD BASED ON 1.73 SQ M-ARVRAT: 60 ML/MIN/1.73M2 (ref 60–?)
GLOBULIN PLAS-MCNC: 3.3 G/DL (ref 2.8–4.4)
GLUCOSE BLD-MCNC: 100 MG/DL (ref 70–99)
GLUCOSE BLD-MCNC: 101 MG/DL (ref 70–99)
GLUCOSE UR STRIP.AUTO-MCNC: NEGATIVE MG/DL
HCT VFR BLD AUTO: 43.6 %
HGB BLD-MCNC: 14.1 G/DL
IMM GRANULOCYTES # BLD AUTO: 0.01 X10(3) UL (ref 0–1)
IMM GRANULOCYTES NFR BLD: 0.2 %
KETONES UR STRIP.AUTO-MCNC: NEGATIVE MG/DL
LYMPHOCYTES # BLD AUTO: 1.1 X10(3) UL (ref 1–4)
LYMPHOCYTES NFR BLD AUTO: 23.1 %
MCH RBC QN AUTO: 30.7 PG (ref 26–34)
MCHC RBC AUTO-ENTMCNC: 32.3 G/DL (ref 31–37)
MCV RBC AUTO: 95 FL
MONOCYTES # BLD AUTO: 0.46 X10(3) UL (ref 0.1–1)
MONOCYTES NFR BLD AUTO: 9.7 %
NEUTROPHILS # BLD AUTO: 3.12 X10 (3) UL (ref 1.5–7.7)
NEUTROPHILS # BLD AUTO: 3.12 X10(3) UL (ref 1.5–7.7)
NEUTROPHILS NFR BLD AUTO: 65.5 %
NITRITE UR QL STRIP.AUTO: NEGATIVE
OSMOLALITY SERPL CALC.SUM OF ELEC: 291 MOSM/KG (ref 275–295)
PH UR STRIP.AUTO: 7 [PH] (ref 5–8)
PLATELET # BLD AUTO: 168 10(3)UL (ref 150–450)
POTASSIUM SERPL-SCNC: 3.6 MMOL/L (ref 3.5–5.1)
PROT SERPL-MCNC: 7.2 G/DL (ref 6.4–8.2)
PROT UR STRIP.AUTO-MCNC: NEGATIVE MG/DL
RBC # BLD AUTO: 4.59 X10(6)UL
SODIUM SERPL-SCNC: 139 MMOL/L (ref 136–145)
SP GR UR STRIP.AUTO: 1.01 (ref 1–1.03)
TROPONIN I HIGH SENSITIVITY: 4 NG/L
UROBILINOGEN UR STRIP.AUTO-MCNC: <2 MG/DL
WBC # BLD AUTO: 4.8 X10(3) UL (ref 4–11)

## 2023-03-20 PROCEDURE — 87086 URINE CULTURE/COLONY COUNT: CPT | Performed by: EMERGENCY MEDICINE

## 2023-03-20 PROCEDURE — 81025 URINE PREGNANCY TEST: CPT

## 2023-03-20 PROCEDURE — 84484 ASSAY OF TROPONIN QUANT: CPT | Performed by: EMERGENCY MEDICINE

## 2023-03-20 PROCEDURE — 96361 HYDRATE IV INFUSION ADD-ON: CPT

## 2023-03-20 PROCEDURE — 99284 EMERGENCY DEPT VISIT MOD MDM: CPT

## 2023-03-20 PROCEDURE — 96375 TX/PRO/DX INJ NEW DRUG ADDON: CPT

## 2023-03-20 PROCEDURE — 93010 ELECTROCARDIOGRAM REPORT: CPT

## 2023-03-20 PROCEDURE — 82962 GLUCOSE BLOOD TEST: CPT

## 2023-03-20 PROCEDURE — 99285 EMERGENCY DEPT VISIT HI MDM: CPT

## 2023-03-20 PROCEDURE — 93005 ELECTROCARDIOGRAM TRACING: CPT

## 2023-03-20 PROCEDURE — 96374 THER/PROPH/DIAG INJ IV PUSH: CPT

## 2023-03-20 PROCEDURE — 85025 COMPLETE CBC W/AUTO DIFF WBC: CPT | Performed by: EMERGENCY MEDICINE

## 2023-03-20 PROCEDURE — 81001 URINALYSIS AUTO W/SCOPE: CPT | Performed by: EMERGENCY MEDICINE

## 2023-03-20 PROCEDURE — 80053 COMPREHEN METABOLIC PANEL: CPT | Performed by: EMERGENCY MEDICINE

## 2023-03-20 RX ORDER — KETOROLAC TROMETHAMINE 15 MG/ML
15 INJECTION, SOLUTION INTRAMUSCULAR; INTRAVENOUS ONCE
Status: COMPLETED | OUTPATIENT
Start: 2023-03-20 | End: 2023-03-20

## 2023-03-20 RX ORDER — KETOROLAC TROMETHAMINE 30 MG/ML
30 INJECTION, SOLUTION INTRAMUSCULAR; INTRAVENOUS ONCE
Status: DISCONTINUED | OUTPATIENT
Start: 2023-03-20 | End: 2023-03-20

## 2023-03-20 RX ORDER — METOCLOPRAMIDE HYDROCHLORIDE 5 MG/ML
10 INJECTION INTRAMUSCULAR; INTRAVENOUS ONCE
Status: COMPLETED | OUTPATIENT
Start: 2023-03-20 | End: 2023-03-20

## 2023-03-20 RX ORDER — DIPHENHYDRAMINE HYDROCHLORIDE 50 MG/ML
25 INJECTION INTRAMUSCULAR; INTRAVENOUS ONCE
Status: COMPLETED | OUTPATIENT
Start: 2023-03-20 | End: 2023-03-20

## 2023-03-20 NOTE — ED QUICK NOTES
Pt states she has had Migraines for the past three days, pt took prescribed Toradol x2 days with no relief, pt has not taken Toradol today.

## 2023-03-20 NOTE — DISCHARGE INSTRUCTIONS
Follow-up with your own urologist Dr. Shoshana Ruiz. Return to the emergency room if you have any severe headache, numbness, weakness. Continue taking her antiseizure medicines. Do not drive. You were seen in the emergency room in a limited time. There is a possibility that although we do not see any acute process at this present time that things can change with time. Is therefore imperative that you follow-up with primary care physician for close follow-up. If there is any significant progression of your pain  or other symptoms you to return immediately to the emergency room. Post-Care Instructions: I reviewed with the patient in detail post-care instructions. Patient is to wear sunprotection, and avoid picking at any of the treated lesions. Pt may apply Vaseline to crusted or scabbing areas. Detail Level: Detailed Medical Necessity Clause: This procedure was medically necessary because the lesions that were treated were: Show Applicator Variable?: Yes Render Note In Bullet Format When Appropriate: No Consent: The patient's consent was obtained including but not limited to risks of crusting, scabbing, blistering, scarring, darker or lighter pigmentary change, recurrence, incomplete removal and infection. Medical Necessity Information: It is in your best interest to select a reason for this procedure from the list below. All of these items fulfill various CMS LCD requirements except the new and changing color options.

## 2023-03-20 NOTE — ED INITIAL ASSESSMENT (HPI)
Pt states she has had migraine for the past three days, took prescribed Toradol for the pain x 2 days with no relief. Pt states she had not taken any Toradol today.

## 2023-03-20 NOTE — ED INITIAL ASSESSMENT (HPI)
Pt arrives to ED via EMS s/p seizure, pt had two seizures prior to arriving to ED. EMS witnessed second seizure and administered 10 mg versed, per EMS seizure stopped once Versed was given.

## 2023-03-21 LAB
ATRIAL RATE: 67 BPM
P AXIS: 44 DEGREES
P-R INTERVAL: 164 MS
Q-T INTERVAL: 400 MS
QRS DURATION: 86 MS
QTC CALCULATION (BEZET): 422 MS
R AXIS: 8 DEGREES
T AXIS: 14 DEGREES
VENTRICULAR RATE: 67 BPM

## 2023-05-04 ENCOUNTER — IMAGING SERVICES (OUTPATIENT)
Dept: OTHER | Age: 48
End: 2023-05-04

## 2023-05-17 ENCOUNTER — APPOINTMENT (OUTPATIENT)
Dept: CV DIAGNOSTICS | Facility: HOSPITAL | Age: 48
DRG: 908 | End: 2023-05-17
Attending: EMERGENCY MEDICINE
Payer: COMMERCIAL

## 2023-05-17 ENCOUNTER — APPOINTMENT (OUTPATIENT)
Dept: GENERAL RADIOLOGY | Facility: HOSPITAL | Age: 48
DRG: 908 | End: 2023-05-17
Attending: EMERGENCY MEDICINE
Payer: COMMERCIAL

## 2023-05-17 ENCOUNTER — APPOINTMENT (OUTPATIENT)
Dept: CT IMAGING | Facility: HOSPITAL | Age: 48
End: 2023-05-17
Attending: EMERGENCY MEDICINE
Payer: COMMERCIAL

## 2023-05-17 ENCOUNTER — APPOINTMENT (OUTPATIENT)
Dept: CT IMAGING | Facility: HOSPITAL | Age: 48
DRG: 908 | End: 2023-05-17
Attending: EMERGENCY MEDICINE
Payer: COMMERCIAL

## 2023-05-17 ENCOUNTER — APPOINTMENT (OUTPATIENT)
Dept: GENERAL RADIOLOGY | Facility: HOSPITAL | Age: 48
End: 2023-05-17
Attending: EMERGENCY MEDICINE
Payer: COMMERCIAL

## 2023-05-17 ENCOUNTER — HOSPITAL ENCOUNTER (INPATIENT)
Facility: HOSPITAL | Age: 48
LOS: 2 days | Discharge: HOME | DRG: 908 | End: 2023-05-19
Attending: EMERGENCY MEDICINE | Admitting: HOSPITALIST
Payer: COMMERCIAL

## 2023-05-17 ENCOUNTER — APPOINTMENT (OUTPATIENT)
Dept: CV DIAGNOSTICS | Facility: HOSPITAL | Age: 48
End: 2023-05-17
Attending: EMERGENCY MEDICINE
Payer: COMMERCIAL

## 2023-05-17 ENCOUNTER — HOSPITAL ENCOUNTER (INPATIENT)
Facility: HOSPITAL | Age: 48
LOS: 2 days | Discharge: HOME | End: 2023-05-19
Attending: EMERGENCY MEDICINE | Admitting: HOSPITALIST
Payer: COMMERCIAL

## 2023-05-17 ENCOUNTER — ANESTHESIA EVENT (OUTPATIENT)
Dept: SURGERY | Facility: HOSPITAL | Age: 48
End: 2023-05-17
Payer: COMMERCIAL

## 2023-05-17 ENCOUNTER — ANESTHESIA (OUTPATIENT)
Dept: SURGERY | Facility: HOSPITAL | Age: 48
End: 2023-05-17
Payer: COMMERCIAL

## 2023-05-17 DIAGNOSIS — S36.892A CONTUSION OF MESENTERY, INITIAL ENCOUNTER: ICD-10-CM

## 2023-05-17 DIAGNOSIS — S09.90XA INJURY OF HEAD, INITIAL ENCOUNTER: ICD-10-CM

## 2023-05-17 DIAGNOSIS — S20.212A CONTUSION OF LEFT CHEST WALL, INITIAL ENCOUNTER: ICD-10-CM
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
## 2023-05-17 LAB
ALBUMIN SERPL-MCNC: 3.6 G/DL (ref 3.4–5)
ALBUMIN/GLOB SERPL: 1 {RATIO} (ref 1–2)
ALP LIVER SERPL-CCNC: 49 U/L
ALT SERPL-CCNC: 24 U/L
ANION GAP SERPL CALC-SCNC: 4 MMOL/L (ref 0–18)
ANTIBODY SCREEN: NEGATIVE
ARTERIAL PATENCY WRIST A: POSITIVE
AST SERPL-CCNC: 24 U/L (ref 15–37)
BASE EXCESS BLDA CALC-SCNC: -5.6 MMOL/L (ref ?–2)
BASOPHILS # BLD AUTO: 0.02 X10(3) UL (ref 0–0.2)
BASOPHILS # BLD AUTO: 0.02 X10(3) UL (ref 0–0.2)
BASOPHILS NFR BLD AUTO: 0.2 %
BASOPHILS NFR BLD AUTO: 0.3 %
BILIRUB SERPL-MCNC: 0.3 MG/DL (ref 0.1–2)
BILIRUB UR QL STRIP.AUTO: NEGATIVE
BODY TEMPERATURE: 98.6 F
BUN BLD-MCNC: 25 MG/DL (ref 7–18)
CA-I BLD-SCNC: 1.15 MMOL/L (ref 0.95–1.32)
CALCIUM BLD-MCNC: 8.6 MG/DL (ref 8.5–10.1)
CHLORIDE SERPL-SCNC: 112 MMOL/L (ref 98–112)
CLARITY UR REFRACT.AUTO: CLEAR
CO2 SERPL-SCNC: 24 MMOL/L (ref 21–32)
COHGB MFR BLD: 1.6 % SAT (ref 0–3)
COLOR UR AUTO: YELLOW
CREAT BLD-MCNC: 1.15 MG/DL
EOSINOPHIL # BLD AUTO: 0.14 X10(3) UL (ref 0–0.7)
EOSINOPHIL # BLD AUTO: 0.14 X10(3) UL (ref 0–0.7)
EOSINOPHIL NFR BLD AUTO: 1.7 %
EOSINOPHIL NFR BLD AUTO: 2.2 %
ERYTHROCYTE [DISTWIDTH] IN BLOOD BY AUTOMATED COUNT: 13.3 %
ERYTHROCYTE [DISTWIDTH] IN BLOOD BY AUTOMATED COUNT: 13.4 %
ETHANOL SERPL-MCNC: <3 MG/DL (ref ?–3)
GFR SERPLBLD BASED ON 1.73 SQ M-ARVRAT: 59 ML/MIN/1.73M2 (ref 60–?)
GLOBULIN PLAS-MCNC: 3.6 G/DL (ref 2.8–4.4)
GLUCOSE BLD-MCNC: 123 MG/DL (ref 70–99)
GLUCOSE UR STRIP.AUTO-MCNC: NEGATIVE MG/DL
HCO3 BLDA-SCNC: 20.6 MEQ/L (ref 21–27)
HCT VFR BLD AUTO: 38.8 %
HCT VFR BLD AUTO: 41.3 %
HGB BLD-MCNC: 12 G/DL
HGB BLD-MCNC: 12.5 G/DL
HGB BLD-MCNC: 13.5 G/DL
IMM GRANULOCYTES # BLD AUTO: 0.03 X10(3) UL (ref 0–1)
IMM GRANULOCYTES # BLD AUTO: 0.06 X10(3) UL (ref 0–1)
IMM GRANULOCYTES NFR BLD: 0.5 %
IMM GRANULOCYTES NFR BLD: 0.7 %
KETONES UR STRIP.AUTO-MCNC: NEGATIVE MG/DL
LACTATE BLD-SCNC: 0.5 MMOL/L (ref 0.5–2)
LYMPHOCYTES # BLD AUTO: 1.26 X10(3) UL (ref 1–4)
LYMPHOCYTES # BLD AUTO: 1.69 X10(3) UL (ref 1–4)
LYMPHOCYTES NFR BLD AUTO: 20 %
LYMPHOCYTES NFR BLD AUTO: 20.3 %
MCH RBC QN AUTO: 29.9 PG (ref 26–34)
MCH RBC QN AUTO: 30.4 PG (ref 26–34)
MCHC RBC AUTO-ENTMCNC: 32.2 G/DL (ref 31–37)
MCHC RBC AUTO-ENTMCNC: 32.7 G/DL (ref 31–37)
MCV RBC AUTO: 92.8 FL
MCV RBC AUTO: 93 FL
METHGB MFR BLD: 0.5 % SAT (ref 0.4–1.5)
MONOCYTES # BLD AUTO: 0.47 X10(3) UL (ref 0.1–1)
MONOCYTES # BLD AUTO: 0.79 X10(3) UL (ref 0.1–1)
MONOCYTES NFR BLD AUTO: 7.4 %
MONOCYTES NFR BLD AUTO: 9.5 %
NEUTROPHILS # BLD AUTO: 4.39 X10 (3) UL (ref 1.5–7.7)
NEUTROPHILS # BLD AUTO: 4.39 X10(3) UL (ref 1.5–7.7)
NEUTROPHILS # BLD AUTO: 5.63 X10 (3) UL (ref 1.5–7.7)
NEUTROPHILS # BLD AUTO: 5.63 X10(3) UL (ref 1.5–7.7)
NEUTROPHILS NFR BLD AUTO: 67.6 %
NEUTROPHILS NFR BLD AUTO: 69.6 %
NITRITE UR QL STRIP.AUTO: NEGATIVE
OSMOLALITY SERPL CALC.SUM OF ELEC: 296 MOSM/KG (ref 275–295)
OXYHGB MFR BLDA: 97.2 % (ref 92–100)
PCO2 BLDA: 35 MM HG (ref 35–45)
PH BLDA: 7.35 [PH] (ref 7.35–7.45)
PH UR STRIP.AUTO: 6 [PH] (ref 5–8)
PLATELET # BLD AUTO: 179 10(3)UL (ref 150–450)
PLATELET # BLD AUTO: 208 10(3)UL (ref 150–450)
PO2 BLDA: 97 MM HG (ref 80–100)
POTASSIUM BLD-SCNC: 3.5 MMOL/L (ref 3.6–5.1)
POTASSIUM SERPL-SCNC: 3.7 MMOL/L (ref 3.5–5.1)
PROT SERPL-MCNC: 7.2 G/DL (ref 6.4–8.2)
PROT UR STRIP.AUTO-MCNC: NEGATIVE MG/DL
RBC # BLD AUTO: 4.18 X10(6)UL
RBC # BLD AUTO: 4.44 X10(6)UL
RH BLOOD TYPE: NEGATIVE
SODIUM BLD-SCNC: 137 MMOL/L (ref 135–145)
SODIUM SERPL-SCNC: 140 MMOL/L (ref 136–145)
SP GR UR STRIP.AUTO: >1.03 (ref 1–1.03)
UROBILINOGEN UR STRIP.AUTO-MCNC: <2 MG/DL
WBC # BLD AUTO: 6.3 X10(3) UL (ref 4–11)
WBC # BLD AUTO: 8.3 X10(3) UL (ref 4–11)

## 2023-05-17 PROCEDURE — 93306 TTE W/DOPPLER COMPLETE: CPT | Performed by: EMERGENCY MEDICINE

## 2023-05-17 PROCEDURE — 99223 1ST HOSP IP/OBS HIGH 75: CPT | Performed by: STUDENT IN AN ORGANIZED HEALTH CARE EDUCATION/TRAINING PROGRAM

## 2023-05-17 PROCEDURE — 72050 X-RAY EXAM NECK SPINE 4/5VWS: CPT | Performed by: EMERGENCY MEDICINE

## 2023-05-17 PROCEDURE — 74178 CT ABD&PLV WO CNTR FLWD CNTR: CPT | Performed by: EMERGENCY MEDICINE

## 2023-05-17 PROCEDURE — 0DJ04ZZ INSPECTION OF UPPER INTESTINAL TRACT, PERCUTANEOUS ENDOSCOPIC APPROACH: ICD-10-PCS | Performed by: STUDENT IN AN ORGANIZED HEALTH CARE EDUCATION/TRAINING PROGRAM

## 2023-05-17 PROCEDURE — 70450 CT HEAD/BRAIN W/O DYE: CPT | Performed by: EMERGENCY MEDICINE

## 2023-05-17 PROCEDURE — 71270 CT THORAX DX C-/C+: CPT | Performed by: EMERGENCY MEDICINE

## 2023-05-17 RX ORDER — MORPHINE SULFATE 4 MG/ML
INJECTION, SOLUTION INTRAMUSCULAR; INTRAVENOUS
Status: COMPLETED
Start: 2023-05-17 | End: 2023-05-17

## 2023-05-17 RX ORDER — HYDROCODONE BITARTRATE AND ACETAMINOPHEN 5; 325 MG/1; MG/1
1 TABLET ORAL EVERY 4 HOURS PRN
Status: DISCONTINUED | OUTPATIENT
Start: 2023-05-17 | End: 2023-05-19

## 2023-05-17 RX ORDER — PROPRANOLOL HYDROCHLORIDE 10 MG/1
10 TABLET ORAL DAILY
COMMUNITY

## 2023-05-17 RX ORDER — ENEMA 19; 7 G/133ML; G/133ML
1 ENEMA RECTAL ONCE AS NEEDED
Status: DISCONTINUED | OUTPATIENT
Start: 2023-05-17 | End: 2023-05-19

## 2023-05-17 RX ORDER — DEXAMETHASONE SODIUM PHOSPHATE 4 MG/ML
VIAL (ML) INJECTION AS NEEDED
Status: DISCONTINUED | OUTPATIENT
Start: 2023-05-17 | End: 2023-05-18 | Stop reason: SURG

## 2023-05-17 RX ORDER — ONDANSETRON 2 MG/ML
4 INJECTION INTRAMUSCULAR; INTRAVENOUS EVERY 6 HOURS PRN
Status: DISCONTINUED | OUTPATIENT
Start: 2023-05-17 | End: 2023-05-19

## 2023-05-17 RX ORDER — MORPHINE SULFATE 4 MG/ML
4 INJECTION, SOLUTION INTRAMUSCULAR; INTRAVENOUS EVERY 30 MIN PRN
Status: ACTIVE | OUTPATIENT
Start: 2023-05-17 | End: 2023-05-17

## 2023-05-17 RX ORDER — DESVENLAFAXINE 25 MG/1
25 TABLET, EXTENDED RELEASE ORAL DAILY
COMMUNITY

## 2023-05-17 RX ORDER — POLYETHYLENE GLYCOL 3350 17 G/17G
17 POWDER, FOR SOLUTION ORAL DAILY PRN
Status: DISCONTINUED | OUTPATIENT
Start: 2023-05-17 | End: 2023-05-19

## 2023-05-17 RX ORDER — ACETAMINOPHEN 500 MG
1000 TABLET ORAL ONCE
Status: COMPLETED | OUTPATIENT
Start: 2023-05-17 | End: 2023-05-17

## 2023-05-17 RX ORDER — MIDAZOLAM HYDROCHLORIDE 1 MG/ML
INJECTION INTRAMUSCULAR; INTRAVENOUS AS NEEDED
Status: DISCONTINUED | OUTPATIENT
Start: 2023-05-17 | End: 2023-05-18 | Stop reason: SURG

## 2023-05-17 RX ORDER — LACOSAMIDE 50 MG/1
TABLET ORAL 2 TIMES DAILY
COMMUNITY

## 2023-05-17 RX ORDER — ONDANSETRON 2 MG/ML
4 INJECTION INTRAMUSCULAR; INTRAVENOUS EVERY 4 HOURS PRN
Status: ACTIVE | OUTPATIENT
Start: 2023-05-17 | End: 2023-05-17

## 2023-05-17 RX ORDER — HYDROMORPHONE HYDROCHLORIDE 1 MG/ML
0.8 INJECTION, SOLUTION INTRAMUSCULAR; INTRAVENOUS; SUBCUTANEOUS EVERY 2 HOUR PRN
Status: DISCONTINUED | OUTPATIENT
Start: 2023-05-17 | End: 2023-05-19

## 2023-05-17 RX ORDER — ONDANSETRON 2 MG/ML
INJECTION INTRAMUSCULAR; INTRAVENOUS AS NEEDED
Status: DISCONTINUED | OUTPATIENT
Start: 2023-05-17 | End: 2023-05-18 | Stop reason: SURG

## 2023-05-17 RX ORDER — LIDOCAINE HYDROCHLORIDE 10 MG/ML
INJECTION, SOLUTION INFILTRATION; PERINEURAL
Status: COMPLETED
Start: 2023-05-17 | End: 2023-05-17

## 2023-05-17 RX ORDER — BISACODYL 10 MG
10 SUPPOSITORY, RECTAL RECTAL
Status: DISCONTINUED | OUTPATIENT
Start: 2023-05-17 | End: 2023-05-19

## 2023-05-17 RX ORDER — ONDANSETRON 2 MG/ML
4 INJECTION INTRAMUSCULAR; INTRAVENOUS EVERY 4 HOURS PRN
Status: DISCONTINUED | OUTPATIENT
Start: 2023-05-17 | End: 2023-05-17

## 2023-05-17 RX ORDER — TEMAZEPAM 30 MG/1
30 CAPSULE ORAL NIGHTLY PRN
COMMUNITY

## 2023-05-17 RX ORDER — HYDROMORPHONE HYDROCHLORIDE 1 MG/ML
0.2 INJECTION, SOLUTION INTRAMUSCULAR; INTRAVENOUS; SUBCUTANEOUS EVERY 2 HOUR PRN
Status: DISCONTINUED | OUTPATIENT
Start: 2023-05-17 | End: 2023-05-19

## 2023-05-17 RX ORDER — HYDROMORPHONE HYDROCHLORIDE 1 MG/ML
0.4 INJECTION, SOLUTION INTRAMUSCULAR; INTRAVENOUS; SUBCUTANEOUS EVERY 2 HOUR PRN
Status: DISCONTINUED | OUTPATIENT
Start: 2023-05-17 | End: 2023-05-19

## 2023-05-17 RX ORDER — ONDANSETRON 2 MG/ML
4 INJECTION INTRAMUSCULAR; INTRAVENOUS ONCE
Status: COMPLETED | OUTPATIENT
Start: 2023-05-17 | End: 2023-05-17

## 2023-05-17 RX ORDER — MORPHINE SULFATE 4 MG/ML
4 INJECTION, SOLUTION INTRAMUSCULAR; INTRAVENOUS ONCE
Status: COMPLETED | OUTPATIENT
Start: 2023-05-17 | End: 2023-05-17

## 2023-05-17 RX ORDER — ALPRAZOLAM 1 MG/1
1 TABLET ORAL NIGHTLY PRN
COMMUNITY

## 2023-05-17 RX ORDER — HYDROCODONE BITARTRATE AND ACETAMINOPHEN 5; 325 MG/1; MG/1
2 TABLET ORAL EVERY 4 HOURS PRN
Status: DISCONTINUED | OUTPATIENT
Start: 2023-05-17 | End: 2023-05-19

## 2023-05-17 RX ORDER — SODIUM CHLORIDE 9 MG/ML
INJECTION, SOLUTION INTRAVENOUS CONTINUOUS
Status: ACTIVE | OUTPATIENT
Start: 2023-05-17 | End: 2023-05-17

## 2023-05-17 RX ORDER — CEFOXITIN 2 G/1
INJECTION, POWDER, FOR SOLUTION INTRAVENOUS AS NEEDED
Status: DISCONTINUED | OUTPATIENT
Start: 2023-05-17 | End: 2023-05-18 | Stop reason: SURG

## 2023-05-17 RX ORDER — LORAZEPAM 1 MG/1
1 TABLET ORAL DAILY
COMMUNITY

## 2023-05-17 RX ORDER — SENNOSIDES 8.6 MG
17.2 TABLET ORAL NIGHTLY PRN
Status: DISCONTINUED | OUTPATIENT
Start: 2023-05-17 | End: 2023-05-19

## 2023-05-17 RX ORDER — SERTRALINE HYDROCHLORIDE 25 MG/1
75 TABLET, FILM COATED ORAL DAILY
COMMUNITY

## 2023-05-17 RX ORDER — LIDOCAINE HYDROCHLORIDE 10 MG/ML
1 INJECTION, SOLUTION INFILTRATION; PERINEURAL ONCE
Status: COMPLETED | OUTPATIENT
Start: 2023-05-17 | End: 2023-05-17

## 2023-05-17 RX ORDER — ACETAMINOPHEN 325 MG/1
650 TABLET ORAL EVERY 4 HOURS PRN
Status: DISCONTINUED | OUTPATIENT
Start: 2023-05-17 | End: 2023-05-19

## 2023-05-17 RX ORDER — BUPIVACAINE HYDROCHLORIDE 2.5 MG/ML
INJECTION, SOLUTION EPIDURAL; INFILTRATION; INTRACAUDAL AS NEEDED
Status: DISCONTINUED | OUTPATIENT
Start: 2023-05-17 | End: 2023-05-18 | Stop reason: HOSPADM

## 2023-05-17 RX ORDER — SODIUM CHLORIDE 9 MG/ML
INJECTION, SOLUTION INTRAVENOUS CONTINUOUS
Status: DISCONTINUED | OUTPATIENT
Start: 2023-05-17 | End: 2023-05-19

## 2023-05-17 RX ORDER — LIDOCAINE HYDROCHLORIDE 10 MG/ML
INJECTION, SOLUTION EPIDURAL; INFILTRATION; INTRACAUDAL; PERINEURAL AS NEEDED
Status: DISCONTINUED | OUTPATIENT
Start: 2023-05-17 | End: 2023-05-18 | Stop reason: SURG

## 2023-05-17 RX ORDER — PROCHLORPERAZINE EDISYLATE 5 MG/ML
5 INJECTION INTRAMUSCULAR; INTRAVENOUS EVERY 8 HOURS PRN
Status: DISCONTINUED | OUTPATIENT
Start: 2023-05-17 | End: 2023-05-19

## 2023-05-17 RX ORDER — ROCURONIUM BROMIDE 10 MG/ML
INJECTION, SOLUTION INTRAVENOUS AS NEEDED
Status: DISCONTINUED | OUTPATIENT
Start: 2023-05-17 | End: 2023-05-18 | Stop reason: SURG

## 2023-05-17 RX ORDER — HYDROMORPHONE HYDROCHLORIDE 1 MG/ML
INJECTION, SOLUTION INTRAMUSCULAR; INTRAVENOUS; SUBCUTANEOUS
Status: DISCONTINUED
Start: 2023-05-17 | End: 2023-05-17 | Stop reason: WASHOUT

## 2023-05-17 RX ADMIN — ROCURONIUM BROMIDE 10 MG: 10 INJECTION, SOLUTION INTRAVENOUS at 23:36:00

## 2023-05-17 RX ADMIN — ONDANSETRON 4 MG: 2 INJECTION INTRAMUSCULAR; INTRAVENOUS at 23:43:00

## 2023-05-17 RX ADMIN — MIDAZOLAM HYDROCHLORIDE 2 MG: 1 INJECTION INTRAMUSCULAR; INTRAVENOUS at 22:44:00

## 2023-05-17 RX ADMIN — DEXAMETHASONE SODIUM PHOSPHATE 4 MG: 4 MG/ML VIAL (ML) INJECTION at 23:02:00

## 2023-05-17 RX ADMIN — ROCURONIUM BROMIDE 50 MG: 10 INJECTION, SOLUTION INTRAVENOUS at 22:55:00

## 2023-05-17 RX ADMIN — LIDOCAINE HYDROCHLORIDE 50 MG: 10 INJECTION, SOLUTION EPIDURAL; INFILTRATION; INTRACAUDAL; PERINEURAL at 22:51:00

## 2023-05-17 RX ADMIN — CEFOXITIN 2 G: 2 INJECTION, POWDER, FOR SOLUTION INTRAVENOUS at 22:58:00

## 2023-05-17 NOTE — ED QUICK NOTES
Orders for admission, patient is aware of plan and ready to go upstairs. Any questions, please call ED RN Navdeep Gamez at extension [phone]. Patient Covid vaccination status: Fully vaccinated     COVID Test Ordered in ED: None    COVID Suspicion at Admission: N/A    Running Infusions:  None    Mental Status/LOC at time of transport: x4    Other pertinent information: Contusion of L chest wall.    CIWA score: N/A   NIH score:  N/A
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
## 2023-05-18 LAB
ANION GAP SERPL CALC-SCNC: 4 MMOL/L (ref 0–18)
BASOPHILS # BLD AUTO: 0.02 X10(3) UL (ref 0–0.2)
BASOPHILS NFR BLD AUTO: 0.3 %
BUN BLD-MCNC: 22 MG/DL (ref 7–18)
CALCIUM BLD-MCNC: 7.6 MG/DL (ref 8.5–10.1)
CHLORIDE SERPL-SCNC: 116 MMOL/L (ref 98–112)
CO2 SERPL-SCNC: 20 MMOL/L (ref 21–32)
CREAT BLD-MCNC: 0.82 MG/DL
EOSINOPHIL # BLD AUTO: 0.01 X10(3) UL (ref 0–0.7)
EOSINOPHIL NFR BLD AUTO: 0.2 %
ERYTHROCYTE [DISTWIDTH] IN BLOOD BY AUTOMATED COUNT: 13.4 %
GFR SERPLBLD BASED ON 1.73 SQ M-ARVRAT: 89 ML/MIN/1.73M2 (ref 60–?)
GLUCOSE BLD-MCNC: 133 MG/DL (ref 70–99)
HCT VFR BLD AUTO: 35.9 %
HGB BLD-MCNC: 11.4 G/DL
IMM GRANULOCYTES # BLD AUTO: 0.03 X10(3) UL (ref 0–1)
IMM GRANULOCYTES NFR BLD: 0.5 %
LYMPHOCYTES # BLD AUTO: 0.71 X10(3) UL (ref 1–4)
LYMPHOCYTES NFR BLD AUTO: 11.1 %
MAGNESIUM SERPL-MCNC: 1.9 MG/DL (ref 1.6–2.6)
MCH RBC QN AUTO: 29.7 PG (ref 26–34)
MCHC RBC AUTO-ENTMCNC: 31.8 G/DL (ref 31–37)
MCV RBC AUTO: 93.5 FL
MONOCYTES # BLD AUTO: 0.39 X10(3) UL (ref 0.1–1)
MONOCYTES NFR BLD AUTO: 6.1 %
NEUTROPHILS # BLD AUTO: 5.24 X10 (3) UL (ref 1.5–7.7)
NEUTROPHILS # BLD AUTO: 5.24 X10(3) UL (ref 1.5–7.7)
NEUTROPHILS NFR BLD AUTO: 81.8 %
OSMOLALITY SERPL CALC.SUM OF ELEC: 295 MOSM/KG (ref 275–295)
PLATELET # BLD AUTO: 171 10(3)UL (ref 150–450)
POTASSIUM SERPL-SCNC: 3.7 MMOL/L (ref 3.5–5.1)
RBC # BLD AUTO: 3.84 X10(6)UL
SODIUM SERPL-SCNC: 140 MMOL/L (ref 136–145)
WBC # BLD AUTO: 6.4 X10(3) UL (ref 4–11)

## 2023-05-18 PROCEDURE — 99291 CRITICAL CARE FIRST HOUR: CPT | Performed by: NURSE PRACTITIONER

## 2023-05-18 PROCEDURE — 49320 DIAG LAPARO SEPARATE PROC: CPT | Performed by: STUDENT IN AN ORGANIZED HEALTH CARE EDUCATION/TRAINING PROGRAM

## 2023-05-18 PROCEDURE — 99221 1ST HOSP IP/OBS SF/LOW 40: CPT

## 2023-05-18 RX ORDER — LORAZEPAM 1 MG/1
1 TABLET ORAL 2 TIMES DAILY
Status: DISCONTINUED | OUTPATIENT
Start: 2023-05-18 | End: 2023-05-19

## 2023-05-18 RX ORDER — LACOSAMIDE 100 MG/1
50 TABLET ORAL 2 TIMES DAILY
Status: DISCONTINUED | OUTPATIENT
Start: 2023-05-18 | End: 2023-05-19

## 2023-05-18 RX ORDER — ALPRAZOLAM 0.5 MG/1
1 TABLET ORAL NIGHTLY PRN
Status: DISCONTINUED | OUTPATIENT
Start: 2023-05-18 | End: 2023-05-19

## 2023-05-18 RX ORDER — DEXAMETHASONE SODIUM PHOSPHATE 4 MG/ML
4 VIAL (ML) INJECTION AS NEEDED
Status: ACTIVE | OUTPATIENT
Start: 2023-05-18 | End: 2023-05-18

## 2023-05-18 RX ORDER — LAMOTRIGINE 100 MG/1
200 TABLET ORAL 2 TIMES DAILY
Status: DISCONTINUED | OUTPATIENT
Start: 2023-05-18 | End: 2023-05-19

## 2023-05-18 RX ORDER — TOPIRAMATE 100 MG/1
200 TABLET, FILM COATED ORAL 2 TIMES DAILY
Status: DISCONTINUED | OUTPATIENT
Start: 2023-05-18 | End: 2023-05-19

## 2023-05-18 RX ORDER — PROPRANOLOL HYDROCHLORIDE 10 MG/1
10 TABLET ORAL DAILY
Status: DISCONTINUED | OUTPATIENT
Start: 2023-05-18 | End: 2023-05-19

## 2023-05-18 RX ORDER — HYDROMORPHONE HYDROCHLORIDE 1 MG/ML
0.4 INJECTION, SOLUTION INTRAMUSCULAR; INTRAVENOUS; SUBCUTANEOUS EVERY 5 MIN PRN
Status: ACTIVE | OUTPATIENT
Start: 2023-05-18 | End: 2023-05-18

## 2023-05-18 RX ORDER — NALOXONE HYDROCHLORIDE 0.4 MG/ML
80 INJECTION, SOLUTION INTRAMUSCULAR; INTRAVENOUS; SUBCUTANEOUS AS NEEDED
Status: ACTIVE | OUTPATIENT
Start: 2023-05-18 | End: 2023-05-18

## 2023-05-18 RX ORDER — HYDROCODONE BITARTRATE AND ACETAMINOPHEN 5; 325 MG/1; MG/1
1 TABLET ORAL ONCE AS NEEDED
Status: ACTIVE | OUTPATIENT
Start: 2023-05-18 | End: 2023-05-18

## 2023-05-18 RX ORDER — DESVENLAFAXINE 25 MG/1
25 TABLET, EXTENDED RELEASE ORAL DAILY
Status: DISCONTINUED | OUTPATIENT
Start: 2023-05-18 | End: 2023-05-19

## 2023-05-18 RX ORDER — POTASSIUM CHLORIDE 14.9 MG/ML
20 INJECTION INTRAVENOUS ONCE
Status: COMPLETED | OUTPATIENT
Start: 2023-05-18 | End: 2023-05-18

## 2023-05-18 RX ORDER — ONDANSETRON 2 MG/ML
4 INJECTION INTRAMUSCULAR; INTRAVENOUS AS NEEDED
Status: ACTIVE | OUTPATIENT
Start: 2023-05-18 | End: 2023-05-18

## 2023-05-18 RX ORDER — TEMAZEPAM 7.5 MG/1
30 CAPSULE ORAL NIGHTLY PRN
Status: DISCONTINUED | OUTPATIENT
Start: 2023-05-18 | End: 2023-05-19

## 2023-05-18 RX ORDER — LORAZEPAM 1 MG/1
1 TABLET ORAL DAILY
Status: DISCONTINUED | OUTPATIENT
Start: 2023-05-18 | End: 2023-05-18

## 2023-05-18 RX ORDER — METOCLOPRAMIDE HYDROCHLORIDE 5 MG/ML
10 INJECTION INTRAMUSCULAR; INTRAVENOUS AS NEEDED
Status: ACTIVE | OUTPATIENT
Start: 2023-05-18 | End: 2023-05-18

## 2023-05-18 RX ORDER — ACETAMINOPHEN 10 MG/ML
1000 INJECTION, SOLUTION INTRAVENOUS EVERY 6 HOURS
Status: DISCONTINUED | OUTPATIENT
Start: 2023-05-18 | End: 2023-05-18

## 2023-05-18 RX ORDER — TRAZODONE HYDROCHLORIDE 50 MG/1
100 TABLET ORAL NIGHTLY
Status: DISCONTINUED | OUTPATIENT
Start: 2023-05-18 | End: 2023-05-19

## 2023-05-18 RX ORDER — HYDROCODONE BITARTRATE AND ACETAMINOPHEN 5; 325 MG/1; MG/1
2 TABLET ORAL ONCE AS NEEDED
Status: ACTIVE | OUTPATIENT
Start: 2023-05-18 | End: 2023-05-18
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
## 2023-05-18 NOTE — ED NOTES
The patient had a stat echo which did not show cardiac contusion, or pericardial effusion. Repeat CBC was within normal also discussed this case with the general surgeon.   Patient blood pressures come up significantly the patient did not look ill or toxic on repeat exam.
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
## 2023-05-18 NOTE — ANESTHESIA PROCEDURE NOTES
Airway  Date/Time: 5/17/2023 10:53 PM  Urgency: elective    Airway not difficult    General Information and Staff    Patient location during procedure: OR  Anesthesiologist: Austyn Daniels MD  Performed: anesthesiologist   Performed by: Austyn Daniels MD  Authorized by: Austyn Daniels MD      Indications and Patient Condition  Indications for airway management: anesthesia  Sedation level: deep  Preoxygenated: yes  Patient position: sniffing  Mask difficulty assessment: 1 - vent by mask    Final Airway Details  Final airway type: endotracheal airway      Successful airway: ETT  Cuffed: yes   Successful intubation technique: direct laryngoscopy  Facilitating devices/methods: cricoid pressure and rapid sequence intubation  Endotracheal tube insertion site: oral  Blade: Estela  Blade size: #3  ETT size (mm): 7.0    Cormack-Lehane Classification: grade I - full view of glottis  Placement verified by: chest auscultation and capnometry   Cuff volume (mL): 9  Measured from: lips  ETT to lips (cm): 22  Number of attempts at approach: 1
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
## 2023-05-19 VITALS
OXYGEN SATURATION: 95 % | RESPIRATION RATE: 18 BRPM | TEMPERATURE: 98 F | BODY MASS INDEX: 31.49 KG/M2 | SYSTOLIC BLOOD PRESSURE: 96 MMHG | HEART RATE: 75 BPM | DIASTOLIC BLOOD PRESSURE: 48 MMHG | HEIGHT: 67 IN | WEIGHT: 200.63 LBS

## 2023-05-19 LAB
ANION GAP SERPL CALC-SCNC: 4 MMOL/L (ref 0–18)
BASOPHILS # BLD AUTO: 0.02 X10(3) UL (ref 0–0.2)
BASOPHILS NFR BLD AUTO: 0.4 %
BUN BLD-MCNC: 12 MG/DL (ref 7–18)
CALCIUM BLD-MCNC: 7.8 MG/DL (ref 8.5–10.1)
CHLORIDE SERPL-SCNC: 120 MMOL/L (ref 98–112)
CO2 SERPL-SCNC: 19 MMOL/L (ref 21–32)
CREAT BLD-MCNC: 0.92 MG/DL
EOSINOPHIL # BLD AUTO: 0.22 X10(3) UL (ref 0–0.7)
EOSINOPHIL NFR BLD AUTO: 4.4 %
ERYTHROCYTE [DISTWIDTH] IN BLOOD BY AUTOMATED COUNT: 13.6 %
GFR SERPLBLD BASED ON 1.73 SQ M-ARVRAT: 78 ML/MIN/1.73M2 (ref 60–?)
GLUCOSE BLD-MCNC: 97 MG/DL (ref 70–99)
HCT VFR BLD AUTO: 35.1 %
HGB BLD-MCNC: 11 G/DL
IMM GRANULOCYTES # BLD AUTO: 0.01 X10(3) UL (ref 0–1)
IMM GRANULOCYTES NFR BLD: 0.2 %
LYMPHOCYTES # BLD AUTO: 1.78 X10(3) UL (ref 1–4)
LYMPHOCYTES NFR BLD AUTO: 35.4 %
MCH RBC QN AUTO: 30.1 PG (ref 26–34)
MCHC RBC AUTO-ENTMCNC: 31.3 G/DL (ref 31–37)
MCV RBC AUTO: 95.9 FL
MONOCYTES # BLD AUTO: 0.47 X10(3) UL (ref 0.1–1)
MONOCYTES NFR BLD AUTO: 9.3 %
NEUTROPHILS # BLD AUTO: 2.53 X10 (3) UL (ref 1.5–7.7)
NEUTROPHILS # BLD AUTO: 2.53 X10(3) UL (ref 1.5–7.7)
NEUTROPHILS NFR BLD AUTO: 50.3 %
OSMOLALITY SERPL CALC.SUM OF ELEC: 296 MOSM/KG (ref 275–295)
PLATELET # BLD AUTO: 161 10(3)UL (ref 150–450)
POTASSIUM SERPL-SCNC: 3.6 MMOL/L (ref 3.5–5.1)
POTASSIUM SERPL-SCNC: 3.6 MMOL/L (ref 3.5–5.1)
RBC # BLD AUTO: 3.66 X10(6)UL
SODIUM SERPL-SCNC: 143 MMOL/L (ref 136–145)
WBC # BLD AUTO: 5 X10(3) UL (ref 4–11)

## 2023-05-19 RX ORDER — KETOROLAC TROMETHAMINE 30 MG/ML
30 INJECTION, SOLUTION INTRAMUSCULAR; INTRAVENOUS ONCE
Status: COMPLETED | OUTPATIENT
Start: 2023-05-19 | End: 2023-05-19

## 2023-05-19 RX ORDER — HYDROCODONE BITARTRATE AND ACETAMINOPHEN 5; 325 MG/1; MG/1
1 TABLET ORAL EVERY 8 HOURS PRN
Qty: 10 TABLET | Refills: 0 | Status: SHIPPED | OUTPATIENT
Start: 2023-05-19

## 2023-05-19 RX ORDER — ENOXAPARIN SODIUM 100 MG/ML
40 INJECTION SUBCUTANEOUS DAILY
Status: DISCONTINUED | OUTPATIENT
Start: 2023-05-19 | End: 2023-05-19

## 2023-05-19 RX ORDER — POTASSIUM CHLORIDE 20 MEQ/1
40 TABLET, EXTENDED RELEASE ORAL EVERY 4 HOURS
Status: COMPLETED | OUTPATIENT
Start: 2023-05-19 | End: 2023-05-19

## 2023-05-19 RX ORDER — SENNA AND DOCUSATE SODIUM 50; 8.6 MG/1; MG/1
1-2 TABLET, FILM COATED ORAL 2 TIMES DAILY PRN
Qty: 30 TABLET | Refills: 0 | Status: SHIPPED | OUTPATIENT
Start: 2023-05-19

## 2023-05-19 NOTE — PLAN OF CARE
IS encouraged. Pt received Norco for pain, VSS. Up to bathroom sith standby assist. Tolerating diet well.      Problem: RESPIRATORY - ADULT  Goal: Achieves optimal ventilation and oxygenation  Description: INTERVENTIONS:  - Assess for changes in respiratory status  - Assess for changes in mentation and behavior  - Position to facilitate oxygenation and minimize respiratory effort  - Oxygen supplementation based on oxygen saturation or ABGs  - Provide Smoking Cessation handout, if applicable  - Encourage broncho-pulmonary hygiene including cough, deep breathe, Incentive Spirometry  - Assess the need for suctioning and perform as needed  - Assess and instruct to report SOB or any respiratory difficulty  - Respiratory Therapy support as indicated  - Manage/alleviate anxiety  - Monitor for signs/symptoms of CO2 retention  Outcome: Progressing

## 2023-05-19 NOTE — PROGRESS NOTES
Pulm/CC  - chart reviewed. No pulm/cc issues. Signed off. Please call with questions. Awaiting transfer to floor or dc.   Marilee Bautista MD

## 2023-05-19 NOTE — PLAN OF CARE
Pt resting in bed on room air. Pt alert and oriented times 4, follows all commands. Walked in hallway with pt. Complains of pain but declined pain meds at this time. Requested migraine medicine for her migraine, hospitalist ordered one time dose. Talked to surgery, hospitalist, APN, and  okay to discharge home. Sent home with paper work and belongings.

## 2023-07-25 ENCOUNTER — APPOINTMENT (OUTPATIENT)
Dept: CT IMAGING | Facility: HOSPITAL | Age: 48
DRG: 101 | End: 2023-07-25
Attending: EMERGENCY MEDICINE
Payer: MEDICARE

## 2023-07-25 ENCOUNTER — NURSE ONLY (OUTPATIENT)
Dept: ELECTROPHYSIOLOGY | Facility: HOSPITAL | Age: 48
DRG: 101 | End: 2023-07-25
Attending: INTERNAL MEDICINE
Payer: MEDICARE

## 2023-07-25 ENCOUNTER — APPOINTMENT (OUTPATIENT)
Dept: GENERAL RADIOLOGY | Facility: HOSPITAL | Age: 48
DRG: 101 | End: 2023-07-25
Attending: EMERGENCY MEDICINE
Payer: MEDICARE

## 2023-07-25 ENCOUNTER — HOSPITAL ENCOUNTER (INPATIENT)
Facility: HOSPITAL | Age: 48
LOS: 1 days | Discharge: HOME | DRG: 101 | End: 2023-07-26
Attending: EMERGENCY MEDICINE | Admitting: INTERNAL MEDICINE
Payer: MEDICARE

## 2023-07-25 DIAGNOSIS — G40.901 STATUS EPILEPTICUS (HCC): Primary | ICD-10-CM

## 2023-07-25 DIAGNOSIS — E87.20 METABOLIC ACIDOSIS: ICD-10-CM

## 2023-07-25 DIAGNOSIS — E87.6 HYPOKALEMIA: ICD-10-CM

## 2023-07-25 DIAGNOSIS — N17.9 AKI (ACUTE KIDNEY INJURY) (HCC): ICD-10-CM

## 2023-07-25 LAB
ALBUMIN SERPL-MCNC: 3.3 G/DL (ref 3.4–5)
ALBUMIN/GLOB SERPL: 0.9 {RATIO} (ref 1–2)
ALP LIVER SERPL-CCNC: 61 U/L
ALT SERPL-CCNC: 15 U/L
AMPHET UR QL SCN: NEGATIVE
ANION GAP SERPL CALC-SCNC: 9 MMOL/L (ref 0–18)
AST SERPL-CCNC: 12 U/L (ref 15–37)
ATRIAL RATE: 99 BPM
B-HCG UR QL: NEGATIVE
BASOPHILS # BLD AUTO: 0 X10(3) UL (ref 0–0.2)
BASOPHILS NFR BLD AUTO: 0 %
BILIRUB SERPL-MCNC: 0.4 MG/DL (ref 0.1–2)
BILIRUB UR QL STRIP.AUTO: NEGATIVE
BUN BLD-MCNC: 19 MG/DL (ref 7–18)
CALCIUM BLD-MCNC: 7.9 MG/DL (ref 8.5–10.1)
CHLORIDE SERPL-SCNC: 112 MMOL/L (ref 98–112)
CK SERPL-CCNC: 171 U/L
CO2 SERPL-SCNC: 17 MMOL/L (ref 21–32)
COCAINE UR QL: NEGATIVE
COLOR UR AUTO: YELLOW
CREAT BLD-MCNC: 1.13 MG/DL
CREAT UR-SCNC: 99.2 MG/DL
EGFRCR SERPLBLD CKD-EPI 2021: 60 ML/MIN/1.73M2 (ref 60–?)
EOSINOPHIL # BLD AUTO: 0.05 X10(3) UL (ref 0–0.7)
EOSINOPHIL NFR BLD AUTO: 0.9 %
ERYTHROCYTE [DISTWIDTH] IN BLOOD BY AUTOMATED COUNT: 13.2 %
GLOBULIN PLAS-MCNC: 3.7 G/DL (ref 2.8–4.4)
GLUCOSE BLD-MCNC: 119 MG/DL (ref 70–99)
GLUCOSE UR STRIP.AUTO-MCNC: NEGATIVE MG/DL
HCT VFR BLD AUTO: 41.5 %
HGB BLD-MCNC: 13.2 G/DL
HYALINE CASTS #/AREA URNS AUTO: PRESENT /LPF
IMM GRANULOCYTES # BLD AUTO: 0.05 X10(3) UL (ref 0–1)
IMM GRANULOCYTES NFR BLD: 0.9 %
KETONES UR STRIP.AUTO-MCNC: NEGATIVE MG/DL
LYMPHOCYTES # BLD AUTO: 0.37 X10(3) UL (ref 1–4)
LYMPHOCYTES NFR BLD AUTO: 6.5 %
MAGNESIUM SERPL-MCNC: 2 MG/DL (ref 1.6–2.6)
MCH RBC QN AUTO: 29.7 PG (ref 26–34)
MCHC RBC AUTO-ENTMCNC: 31.8 G/DL (ref 31–37)
MCV RBC AUTO: 93.5 FL
MDMA UR QL SCN: NEGATIVE
MONOCYTES # BLD AUTO: 0.21 X10(3) UL (ref 0.1–1)
MONOCYTES NFR BLD AUTO: 3.7 %
NEUTROPHILS # BLD AUTO: 5 X10 (3) UL (ref 1.5–7.7)
NEUTROPHILS # BLD AUTO: 5 X10(3) UL (ref 1.5–7.7)
NEUTROPHILS NFR BLD AUTO: 88 %
NITRITE UR QL STRIP.AUTO: NEGATIVE
OPIATES UR QL SCN: NEGATIVE
OSMOLALITY SERPL CALC.SUM OF ELEC: 289 MOSM/KG (ref 275–295)
OXYCODONE UR QL SCN: NEGATIVE
P AXIS: 39 DEGREES
P-R INTERVAL: 146 MS
PH UR STRIP.AUTO: 5 [PH] (ref 5–8)
PLATELET # BLD AUTO: 185 10(3)UL (ref 150–450)
POTASSIUM SERPL-SCNC: 3.4 MMOL/L (ref 3.5–5.1)
PROCALCITONIN SERPL-MCNC: 0.13 NG/ML (ref ?–0.16)
PROT SERPL-MCNC: 7 G/DL (ref 6.4–8.2)
PROT UR STRIP.AUTO-MCNC: NEGATIVE MG/DL
Q-T INTERVAL: 334 MS
QRS DURATION: 84 MS
QTC CALCULATION (BEZET): 428 MS
R AXIS: -10 DEGREES
RBC # BLD AUTO: 4.44 X10(6)UL
RBC UR QL AUTO: NEGATIVE
SODIUM SERPL-SCNC: 138 MMOL/L (ref 136–145)
SP GR UR STRIP.AUTO: 1.01 (ref 1–1.03)
T AXIS: 31 DEGREES
TSI SER-ACNC: 0.8 MIU/ML (ref 0.36–3.74)
UROBILINOGEN UR STRIP.AUTO-MCNC: <2 MG/DL
VENTRICULAR RATE: 99 BPM
WBC # BLD AUTO: 5.7 X10(3) UL (ref 4–11)

## 2023-07-25 PROCEDURE — 71045 X-RAY EXAM CHEST 1 VIEW: CPT | Performed by: EMERGENCY MEDICINE

## 2023-07-25 PROCEDURE — 70450 CT HEAD/BRAIN W/O DYE: CPT | Performed by: EMERGENCY MEDICINE

## 2023-07-25 PROCEDURE — 99291 CRITICAL CARE FIRST HOUR: CPT | Performed by: INTERNAL MEDICINE

## 2023-07-25 PROCEDURE — 99292 CRITICAL CARE ADDL 30 MIN: CPT | Performed by: INTERNAL MEDICINE

## 2023-07-25 RX ORDER — LORAZEPAM 2 MG/ML
1 INJECTION INTRAMUSCULAR ONCE
Status: COMPLETED | OUTPATIENT
Start: 2023-07-25 | End: 2023-07-25

## 2023-07-25 RX ORDER — DEXAMETHASONE SODIUM PHOSPHATE 10 MG/ML
10 INJECTION, SOLUTION INTRAMUSCULAR; INTRAVENOUS ONCE
Status: COMPLETED | OUTPATIENT
Start: 2023-07-25 | End: 2023-07-25

## 2023-07-25 RX ORDER — LACOSAMIDE 10 MG/ML
100 INJECTION, SOLUTION INTRAVENOUS EVERY 12 HOURS
Status: DISCONTINUED | OUTPATIENT
Start: 2023-07-25 | End: 2023-07-26

## 2023-07-25 RX ORDER — SODIUM CHLORIDE 9 MG/ML
INJECTION, SOLUTION INTRAVENOUS CONTINUOUS
Status: DISCONTINUED | OUTPATIENT
Start: 2023-07-25 | End: 2023-07-26

## 2023-07-25 RX ORDER — LEVETIRACETAM 500 MG/5ML
1500 INJECTION, SOLUTION, CONCENTRATE INTRAVENOUS ONCE
Status: COMPLETED | OUTPATIENT
Start: 2023-07-25 | End: 2023-07-25

## 2023-07-25 RX ORDER — POTASSIUM CHLORIDE 14.9 MG/ML
20 INJECTION INTRAVENOUS ONCE
Status: COMPLETED | OUTPATIENT
Start: 2023-07-25 | End: 2023-07-25

## 2023-07-25 RX ORDER — ACETAMINOPHEN 500 MG
1000 TABLET ORAL EVERY 6 HOURS PRN
Status: DISCONTINUED | OUTPATIENT
Start: 2023-07-25 | End: 2023-07-26

## 2023-07-25 RX ORDER — LORAZEPAM 2 MG/ML
INJECTION INTRAMUSCULAR
Status: COMPLETED
Start: 2023-07-25 | End: 2023-07-25

## 2023-07-25 RX ORDER — DIPHENHYDRAMINE HYDROCHLORIDE 50 MG/ML
25 INJECTION INTRAMUSCULAR; INTRAVENOUS ONCE
Status: COMPLETED | OUTPATIENT
Start: 2023-07-25 | End: 2023-07-25

## 2023-07-25 RX ORDER — LORAZEPAM 2 MG/ML
2 INJECTION INTRAMUSCULAR
Status: DISCONTINUED | OUTPATIENT
Start: 2023-07-25 | End: 2023-07-26

## 2023-07-25 RX ORDER — LORAZEPAM 2 MG/ML
2 INJECTION INTRAMUSCULAR ONCE AS NEEDED
Status: DISCONTINUED | OUTPATIENT
Start: 2023-07-29 | End: 2023-07-26

## 2023-07-25 RX ORDER — PROPRANOLOL HYDROCHLORIDE 10 MG/1
10 TABLET ORAL DAILY
Status: DISCONTINUED | OUTPATIENT
Start: 2023-07-25 | End: 2023-07-26

## 2023-07-25 RX ORDER — PROCHLORPERAZINE EDISYLATE 5 MG/ML
10 INJECTION INTRAMUSCULAR; INTRAVENOUS ONCE
Status: COMPLETED | OUTPATIENT
Start: 2023-07-25 | End: 2023-07-25

## 2023-07-25 RX ORDER — KETOROLAC TROMETHAMINE 15 MG/ML
15 INJECTION, SOLUTION INTRAMUSCULAR; INTRAVENOUS ONCE
Status: COMPLETED | OUTPATIENT
Start: 2023-07-25 | End: 2023-07-25

## 2023-07-25 RX ORDER — ENOXAPARIN SODIUM 100 MG/ML
40 INJECTION SUBCUTANEOUS EVERY EVENING
Status: DISCONTINUED | OUTPATIENT
Start: 2023-07-25 | End: 2023-07-26

## 2023-07-25 RX ORDER — LAMOTRIGINE 200 MG/1
200 TABLET ORAL 2 TIMES DAILY
Status: DISCONTINUED | OUTPATIENT
Start: 2023-07-25 | End: 2023-07-26

## 2023-07-25 RX ORDER — LORAZEPAM 2 MG/ML
2 INJECTION INTRAMUSCULAR ONCE
Status: COMPLETED | OUTPATIENT
Start: 2023-07-25 | End: 2023-07-25

## 2023-07-25 RX ORDER — ONDANSETRON 2 MG/ML
4 INJECTION INTRAMUSCULAR; INTRAVENOUS EVERY 6 HOURS PRN
Status: DISCONTINUED | OUTPATIENT
Start: 2023-07-25 | End: 2023-07-26

## 2023-07-25 RX ORDER — LORAZEPAM 2 MG/ML
2 INJECTION INTRAMUSCULAR ONCE AS NEEDED
Status: DISCONTINUED | OUTPATIENT
Start: 2023-07-30 | End: 2023-07-26

## 2023-07-25 RX ORDER — TOPIRAMATE 100 MG/1
200 TABLET, FILM COATED ORAL 2 TIMES DAILY
Status: DISCONTINUED | OUTPATIENT
Start: 2023-07-25 | End: 2023-07-26

## 2023-07-25 RX ORDER — TEMAZEPAM 15 MG/1
30 CAPSULE ORAL NIGHTLY PRN
Status: DISCONTINUED | OUTPATIENT
Start: 2023-07-25 | End: 2023-07-26

## 2023-07-25 RX ORDER — LORAZEPAM 2 MG/ML
2 INJECTION INTRAMUSCULAR ONCE AS NEEDED
Status: DISCONTINUED | OUTPATIENT
Start: 2023-07-31 | End: 2023-07-26

## 2023-07-25 NOTE — PLAN OF CARE
Patient care assumed in the 1500 hour, when pt arrived to room from ED. Pt a little lethargic, but neurologically intact. Denies HA or other pains. NSR on tele. Saturating well on room air. Pulses palpable. Seizure precautions in place. Neuro checks completed Q1 hr with pupillometry. EEG initiated. Purewick applied.

## 2023-07-25 NOTE — ED INITIAL ASSESSMENT (HPI)
Brought by medic with complaints of seizure  4  times today  last seizure 10 mins ago received medazolam 2 mg from EMS  . Complaining  of headache  now patient said she did not take medication last night . because she was sick . Known case of seizure disorder on medications .

## 2023-07-26 VITALS
RESPIRATION RATE: 26 BRPM | TEMPERATURE: 98 F | WEIGHT: 193.56 LBS | DIASTOLIC BLOOD PRESSURE: 73 MMHG | OXYGEN SATURATION: 98 % | HEART RATE: 72 BPM | HEIGHT: 67 IN | SYSTOLIC BLOOD PRESSURE: 113 MMHG | BODY MASS INDEX: 30.38 KG/M2

## 2023-07-26 LAB
ALBUMIN SERPL-MCNC: 2.9 G/DL (ref 3.4–5)
ALBUMIN/GLOB SERPL: 0.8 {RATIO} (ref 1–2)
ALP LIVER SERPL-CCNC: 56 U/L
ALT SERPL-CCNC: 16 U/L
ANION GAP SERPL CALC-SCNC: 5 MMOL/L (ref 0–18)
AST SERPL-CCNC: 15 U/L (ref 15–37)
BASOPHILS # BLD AUTO: 0 X10(3) UL (ref 0–0.2)
BASOPHILS NFR BLD AUTO: 0 %
BILIRUB SERPL-MCNC: 0.2 MG/DL (ref 0.1–2)
BUN BLD-MCNC: 17 MG/DL (ref 7–18)
CALCIUM BLD-MCNC: 7.6 MG/DL (ref 8.5–10.1)
CHLORIDE SERPL-SCNC: 119 MMOL/L (ref 98–112)
CO2 SERPL-SCNC: 18 MMOL/L (ref 21–32)
CREAT BLD-MCNC: 0.89 MG/DL
EGFRCR SERPLBLD CKD-EPI 2021: 80 ML/MIN/1.73M2 (ref 60–?)
EOSINOPHIL # BLD AUTO: 0.08 X10(3) UL (ref 0–0.7)
EOSINOPHIL NFR BLD AUTO: 1.7 %
ERYTHROCYTE [DISTWIDTH] IN BLOOD BY AUTOMATED COUNT: 13.2 %
GLOBULIN PLAS-MCNC: 3.5 G/DL (ref 2.8–4.4)
GLUCOSE BLD-MCNC: 92 MG/DL (ref 70–99)
HCT VFR BLD AUTO: 39.6 %
HGB BLD-MCNC: 12.6 G/DL
IMM GRANULOCYTES # BLD AUTO: 0.04 X10(3) UL (ref 0–1)
IMM GRANULOCYTES NFR BLD: 0.9 %
LYMPHOCYTES # BLD AUTO: 1.17 X10(3) UL (ref 1–4)
LYMPHOCYTES NFR BLD AUTO: 25.3 %
MCH RBC QN AUTO: 29.4 PG (ref 26–34)
MCHC RBC AUTO-ENTMCNC: 31.8 G/DL (ref 31–37)
MCV RBC AUTO: 92.5 FL
MONOCYTES # BLD AUTO: 0.45 X10(3) UL (ref 0.1–1)
MONOCYTES NFR BLD AUTO: 9.7 %
NEUTROPHILS # BLD AUTO: 2.88 X10 (3) UL (ref 1.5–7.7)
NEUTROPHILS # BLD AUTO: 2.88 X10(3) UL (ref 1.5–7.7)
NEUTROPHILS NFR BLD AUTO: 62.4 %
OSMOLALITY SERPL CALC.SUM OF ELEC: 295 MOSM/KG (ref 275–295)
PLATELET # BLD AUTO: 167 10(3)UL (ref 150–450)
POTASSIUM SERPL-SCNC: 3.3 MMOL/L (ref 3.5–5.1)
PROT SERPL-MCNC: 6.4 G/DL (ref 6.4–8.2)
RBC # BLD AUTO: 4.28 X10(6)UL
SODIUM SERPL-SCNC: 142 MMOL/L (ref 136–145)
WBC # BLD AUTO: 4.6 X10(3) UL (ref 4–11)

## 2023-07-26 PROCEDURE — 95720 EEG PHY/QHP EA INCR W/VEEG: CPT | Performed by: OTHER

## 2023-07-26 PROCEDURE — 99291 CRITICAL CARE FIRST HOUR: CPT | Performed by: INTERNAL MEDICINE

## 2023-07-26 RX ORDER — KETOROLAC TROMETHAMINE 30 MG/ML
30 INJECTION, SOLUTION INTRAMUSCULAR; INTRAVENOUS EVERY 6 HOURS PRN
Status: DISCONTINUED | OUTPATIENT
Start: 2023-07-26 | End: 2023-07-26

## 2023-07-26 RX ORDER — KETOROLAC TROMETHAMINE 15 MG/ML
15 INJECTION, SOLUTION INTRAMUSCULAR; INTRAVENOUS EVERY 6 HOURS PRN
Status: DISCONTINUED | OUTPATIENT
Start: 2023-07-26 | End: 2023-07-26

## 2023-07-26 RX ORDER — METOCLOPRAMIDE HYDROCHLORIDE 5 MG/ML
10 INJECTION INTRAMUSCULAR; INTRAVENOUS EVERY 6 HOURS PRN
Status: DISCONTINUED | OUTPATIENT
Start: 2023-07-26 | End: 2023-07-26

## 2023-07-26 RX ORDER — POTASSIUM CHLORIDE 20 MEQ/1
40 TABLET, EXTENDED RELEASE ORAL ONCE
Status: COMPLETED | OUTPATIENT
Start: 2023-07-26 | End: 2023-07-26

## 2023-07-26 RX ORDER — LACOSAMIDE 100 MG/1
100 TABLET ORAL 2 TIMES DAILY
Status: DISCONTINUED | OUTPATIENT
Start: 2023-07-26 | End: 2023-07-26

## 2023-07-26 RX ORDER — PROPRANOLOL HYDROCHLORIDE 10 MG/1
10 TABLET ORAL 2 TIMES DAILY
Status: DISCONTINUED | OUTPATIENT
Start: 2023-07-26 | End: 2023-07-26

## 2023-07-26 NOTE — PROCEDURES
.  LONG-TERM VIDEO EEG REPORT;    Reason for Examination: Status epilepticus    Technical Summary:   18 Channels of EEG and 1 Channel of EKG was performed utilizing internation 10/20 method. Recording start date/time: 07/25 at 1722. Recording end date/time: 07/26 at 1040. Background Activity: The background activity consisted of 10 Hz waveforms, reactive to eye opening/ external stimulation. Abnormality:  Throughout the recording spike, sharp wave, sharp and wave and slow wave activity were noted in the left temporal region with phase reversal at T3 and T5. Rare independent right temporal sharp wave activity was also noted. Activation:    Hyperventilation:   Not performed. Photic Stimulation:  No driving response seen. Sleep:  Stage I and II sleep seen. Impression:  Abnormal long-term video EEG   Throughout the recording spike, sharp wave, sharp and wave and slow wave activity were noted in the left temporal region with phase reversal at T3 and T5. Rare independent right temporal sharp wave activity was also noted. These waveforms are epileptiform in nature. Mild diffuse slowing into delta and theta range was noted. This constellation of findings can be seen in encephalopathy due to metabolic/toxic etiology, medication effects or diffuse cerebral injury. No evidence of electrographic seizures were noted. No evidence of status epilepticus was noted. Tom Campbell MD  [facility].

## 2023-07-26 NOTE — DISCHARGE SUMMARY
[facility] Hospitalist Discharge Summary     Patient ID:  Mili Can  53year old  7/22/1975    Admit date: 7/25/2023    Discharge date and time: 07/26/23     Attending Physician: Sabina Tomlin MD     Primary Care Physician: Jerzy Velazquez MD     Discharge Diagnoses: Hypokalemia [E87.6]  Status epilepticus ([facility] [S65.059]  Metabolic acidosis [J44.77]  AYDE (acute kidney injury) ([facility] [N17.9]    Please note that only IHP DMG and EMG patients enrolled in the Medicare ACO, [facility]CO and [address] [place] [address] will be handled by the [address] [place], [place] [address]S [facility] Management team.  For all other patients, please follow usual protocol for discharge care transition. Discharge Condition: stable    Disposition:  home    Important Follow up:  - PCP within 2 weeks              Hospital Course:         53year old female with PMH sig for epilepsy, CKD stage III, anxiety/depression presented with breakthrough seizures. She reports throwing up yesterday evening and thought some of her medications also came out with the vomitus. She started having some breakthrough seizures yesterday and then this morning. She does report a headache. She received midazolam in route by EMS. In the ER she was tachycardic and initially tachypneic. Labs significant for acidosis and hypokalemia, imaging unremarkable. Neurology consulted, she was loaded with Keppra and Ativan. Will be admitted to neuro ICU. Neuro intensivist also consulted.       Recurrent seizures  Status Epilepticus   - threw up her AEDs which lead to subtherapeutic levels causing seizures   - AEDs per neurology, keppra loading initially  - resume home AEDs on discharge, no changes   - prn ativan  - neurology/neuroICU following - vEEG  - seizure protocol  - CTOH reviewed, no acute changes      CKD stage 3 with acidosis  - likely from seizures, should resolve with fluids  - avoid renal toxins     Anxiety/depression  - resume home meds per neuro    Consults: IP CONSULT TO NEUROLOGY  IP CONSULT TO NEUROLOGY  IP CONSULT TO HOSPITALIST    Operative Procedures:        Patient instructions:      I as the attending physician reconciled the current and discharge medications on day of discharge. Current Discharge Medication List    CONTINUE these medications which have NOT CHANGED    senna-docusate 8.6-50 MG Oral Tab  Take 1-2 tablets by mouth 2 (two) times daily as needed for constipation. ALPRAZolam 1 MG Oral Tab  Take 1 tablet (1 mg total) by mouth nightly as needed. desvenlafaxine ER 25 MG Oral Tablet 24 Hr  Take 1 tablet (25 mg total) by mouth daily. LORazepam 1 MG Oral Tab  Take 1 tablet (1 mg total) by mouth daily. propranolol 10 MG Oral Tab  Take 1 tablet (10 mg total) by mouth 2 (two) times daily. sertraline 100 MG Oral Tab  Take 1 tablet (100 mg total) by mouth daily. Ketorolac Tromethamine 60 MG/2ML Intramuscular Solution  Inject 1ml IM as needed for migraine; no more than twice in 24hrs. Cariprazine HCl (VRAYLAR) 1.5 MG Oral Cap  As Directed. SUMAtriptan Succinate 100 MG Oral Tab  as needed. lamoTRIgine 200 MG Oral Tab  Take 1 tablet (200 mg total) by mouth 2 (two) times daily. topiramate 200 MG Oral Tab  Take 1 tablet (200 mg total) by mouth 2 (two) times daily. lacosamide 50 MG Oral Tab  Take by mouth 2 (two) times daily. temazepam 30 MG Oral Cap  Take 1 capsule (30 mg total) by mouth nightly as needed for Sleep. OnabotulinumtoxinA (BOTOX) 200 units Injection Recon Soln  every 3 (three) months.       STOP taking these medications    HYDROcodone-acetaminophen 5-325 MG Oral Tab    Brivaracetam (BRIVIACT OR)          Activity: activity as tolerated  Diet: regular diet  Wound Care: as directed  Code Status: Full Code      Discharge Exam:     General: no acute distress, alert and oriented x 3  Heart: RRR  Lungs: clear bilaterally, no active wheezing  Abdomen: nontender, nondistended, intact BS  Extremities: no pedal edema   Neuro: CN inact, no focal deficits      Total time coordinating care for discharge: Greater than 30 minutes    Lakshmi Roberto MD  [facility]

## 2023-07-27 LAB
LAMOTRIGINE LVL: 2.9 UG/ML
TOPIRAMATE LVL: 9.7 UG/ML

## 2023-08-28 ENCOUNTER — APPOINTMENT (OUTPATIENT)
Dept: CT IMAGING | Facility: HOSPITAL | Age: 48
End: 2023-08-28
Attending: EMERGENCY MEDICINE
Payer: MEDICARE

## 2023-08-28 ENCOUNTER — HOSPITAL ENCOUNTER (EMERGENCY)
Facility: HOSPITAL | Age: 48
Discharge: HOME | End: 2023-08-28
Attending: EMERGENCY MEDICINE
Payer: MEDICARE

## 2023-08-28 VITALS
HEART RATE: 65 BPM | BODY MASS INDEX: 31 KG/M2 | TEMPERATURE: 99 F | OXYGEN SATURATION: 99 % | WEIGHT: 200 LBS | RESPIRATION RATE: 15 BRPM | SYSTOLIC BLOOD PRESSURE: 102 MMHG | DIASTOLIC BLOOD PRESSURE: 66 MMHG

## 2023-08-28 DIAGNOSIS — G43.809 OTHER MIGRAINE WITHOUT STATUS MIGRAINOSUS, NOT INTRACTABLE: ICD-10-CM

## 2023-08-28 DIAGNOSIS — R56.9 SEIZURE (HCC): Primary | ICD-10-CM

## 2023-08-28 LAB
ALBUMIN SERPL-MCNC: 3.6 G/DL (ref 3.4–5)
ALBUMIN/GLOB SERPL: 1 {RATIO} (ref 1–2)
ALP LIVER SERPL-CCNC: 66 U/L
ALT SERPL-CCNC: 21 U/L
ANION GAP SERPL CALC-SCNC: 5 MMOL/L (ref 0–18)
APTT PPP: 24.1 SECONDS (ref 23.3–35.6)
AST SERPL-CCNC: 13 U/L (ref 15–37)
BASOPHILS # BLD AUTO: 0.03 X10(3) UL (ref 0–0.2)
BASOPHILS NFR BLD AUTO: 0.4 %
BILIRUB SERPL-MCNC: 0.2 MG/DL (ref 0.1–2)
BUN BLD-MCNC: 17 MG/DL (ref 7–18)
CALCIUM BLD-MCNC: 8.2 MG/DL (ref 8.5–10.1)
CHLORIDE SERPL-SCNC: 115 MMOL/L (ref 98–112)
CO2 SERPL-SCNC: 20 MMOL/L (ref 21–32)
CREAT BLD-MCNC: 1.09 MG/DL
EGFRCR SERPLBLD CKD-EPI 2021: 63 ML/MIN/1.73M2 (ref 60–?)
EOSINOPHIL # BLD AUTO: 0.11 X10(3) UL (ref 0–0.7)
EOSINOPHIL NFR BLD AUTO: 1.5 %
ERYTHROCYTE [DISTWIDTH] IN BLOOD BY AUTOMATED COUNT: 13.3 %
GLOBULIN PLAS-MCNC: 3.6 G/DL (ref 2.8–4.4)
GLUCOSE BLD-MCNC: 103 MG/DL (ref 70–99)
GLUCOSE BLD-MCNC: 88 MG/DL (ref 70–99)
HCT VFR BLD AUTO: 42.3 %
HGB BLD-MCNC: 13.7 G/DL
IMM GRANULOCYTES # BLD AUTO: 0.03 X10(3) UL (ref 0–1)
IMM GRANULOCYTES NFR BLD: 0.4 %
INR BLD: 1.02 (ref 0.85–1.16)
LYMPHOCYTES # BLD AUTO: 1.89 X10(3) UL (ref 1–4)
LYMPHOCYTES NFR BLD AUTO: 26.2 %
MCH RBC QN AUTO: 29.1 PG (ref 26–34)
MCHC RBC AUTO-ENTMCNC: 32.4 G/DL (ref 31–37)
MCV RBC AUTO: 90 FL
MONOCYTES # BLD AUTO: 0.52 X10(3) UL (ref 0.1–1)
MONOCYTES NFR BLD AUTO: 7.2 %
NEUTROPHILS # BLD AUTO: 4.63 X10 (3) UL (ref 1.5–7.7)
NEUTROPHILS # BLD AUTO: 4.63 X10(3) UL (ref 1.5–7.7)
NEUTROPHILS NFR BLD AUTO: 64.3 %
OSMOLALITY SERPL CALC.SUM OF ELEC: 292 MOSM/KG (ref 275–295)
PLATELET # BLD AUTO: 209 10(3)UL (ref 150–450)
POTASSIUM SERPL-SCNC: 3.4 MMOL/L (ref 3.5–5.1)
PROT SERPL-MCNC: 7.2 G/DL (ref 6.4–8.2)
PROTHROMBIN TIME: 13.4 SECONDS (ref 11.6–14.8)
RBC # BLD AUTO: 4.7 X10(6)UL
SODIUM SERPL-SCNC: 140 MMOL/L (ref 136–145)
TROPONIN I HIGH SENSITIVITY: 4 NG/L
WBC # BLD AUTO: 7.2 X10(3) UL (ref 4–11)

## 2023-08-28 PROCEDURE — 96374 THER/PROPH/DIAG INJ IV PUSH: CPT

## 2023-08-28 PROCEDURE — 96375 TX/PRO/DX INJ NEW DRUG ADDON: CPT

## 2023-08-28 PROCEDURE — 85025 COMPLETE CBC W/AUTO DIFF WBC: CPT | Performed by: EMERGENCY MEDICINE

## 2023-08-28 PROCEDURE — 93010 ELECTROCARDIOGRAM REPORT: CPT

## 2023-08-28 PROCEDURE — 99285 EMERGENCY DEPT VISIT HI MDM: CPT

## 2023-08-28 PROCEDURE — 84484 ASSAY OF TROPONIN QUANT: CPT | Performed by: EMERGENCY MEDICINE

## 2023-08-28 PROCEDURE — 85730 THROMBOPLASTIN TIME PARTIAL: CPT | Performed by: EMERGENCY MEDICINE

## 2023-08-28 PROCEDURE — 93005 ELECTROCARDIOGRAM TRACING: CPT

## 2023-08-28 PROCEDURE — 82962 GLUCOSE BLOOD TEST: CPT

## 2023-08-28 PROCEDURE — 70450 CT HEAD/BRAIN W/O DYE: CPT | Performed by: EMERGENCY MEDICINE

## 2023-08-28 PROCEDURE — 85610 PROTHROMBIN TIME: CPT | Performed by: EMERGENCY MEDICINE

## 2023-08-28 PROCEDURE — 80053 COMPREHEN METABOLIC PANEL: CPT | Performed by: EMERGENCY MEDICINE

## 2023-08-28 RX ORDER — DIPHENHYDRAMINE HYDROCHLORIDE 50 MG/ML
25 INJECTION INTRAMUSCULAR; INTRAVENOUS ONCE
Status: COMPLETED | OUTPATIENT
Start: 2023-08-28 | End: 2023-08-28

## 2023-08-28 RX ORDER — KETOROLAC TROMETHAMINE 15 MG/ML
30 INJECTION, SOLUTION INTRAMUSCULAR; INTRAVENOUS ONCE
Status: COMPLETED | OUTPATIENT
Start: 2023-08-28 | End: 2023-08-28

## 2023-08-28 RX ORDER — LORAZEPAM 2 MG/ML
INJECTION INTRAMUSCULAR
Status: COMPLETED
Start: 2023-08-28 | End: 2023-08-28

## 2023-08-28 RX ORDER — POTASSIUM CHLORIDE 20 MEQ/1
20 TABLET, EXTENDED RELEASE ORAL ONCE
Status: COMPLETED | OUTPATIENT
Start: 2023-08-28 | End: 2023-08-28

## 2023-08-28 RX ORDER — METOCLOPRAMIDE HYDROCHLORIDE 5 MG/ML
5 INJECTION INTRAMUSCULAR; INTRAVENOUS ONCE
Status: COMPLETED | OUTPATIENT
Start: 2023-08-28 | End: 2023-08-28

## 2023-08-28 RX ORDER — SODIUM CHLORIDE 9 MG/ML
1000 INJECTION, SOLUTION INTRAVENOUS ONCE
Status: COMPLETED | OUTPATIENT
Start: 2023-08-28 | End: 2023-08-28

## 2023-08-28 RX ORDER — LORAZEPAM 2 MG/ML
0.5 INJECTION INTRAMUSCULAR ONCE
Status: COMPLETED | OUTPATIENT
Start: 2023-08-28 | End: 2023-08-28

## 2023-08-29 LAB
ATRIAL RATE: 69 BPM
P AXIS: 3 DEGREES
P-R INTERVAL: 138 MS
Q-T INTERVAL: 378 MS
QRS DURATION: 86 MS
QTC CALCULATION (BEZET): 405 MS
R AXIS: -4 DEGREES
T AXIS: 29 DEGREES
VENTRICULAR RATE: 69 BPM

## 2023-09-17 ENCOUNTER — HOSPITAL ENCOUNTER (OUTPATIENT)
Age: 48
Discharge: HOME | End: 2023-09-17
Payer: MEDICARE

## 2023-09-17 VITALS
TEMPERATURE: 98 F | HEART RATE: 78 BPM | BODY MASS INDEX: 31.39 KG/M2 | OXYGEN SATURATION: 98 % | WEIGHT: 200 LBS | SYSTOLIC BLOOD PRESSURE: 129 MMHG | HEIGHT: 67 IN | DIASTOLIC BLOOD PRESSURE: 82 MMHG | RESPIRATION RATE: 20 BRPM

## 2023-09-17 DIAGNOSIS — S76.211A INGUINAL STRAIN, RIGHT, INITIAL ENCOUNTER: Primary | ICD-10-CM

## 2023-09-17 RX ORDER — KETOROLAC TROMETHAMINE 30 MG/ML
60 INJECTION, SOLUTION INTRAMUSCULAR; INTRAVENOUS ONCE
Status: COMPLETED | OUTPATIENT
Start: 2023-09-17 | End: 2023-09-17

## 2023-09-17 RX ORDER — TRAMADOL HYDROCHLORIDE 50 MG/1
50 TABLET ORAL EVERY 6 HOURS PRN
Qty: 10 TABLET | Refills: 0 | Status: SHIPPED | OUTPATIENT
Start: 2023-09-17

## 2023-09-21 ENCOUNTER — HOSPITAL ENCOUNTER (EMERGENCY)
Facility: HOSPITAL | Age: 48
Discharge: HOME | End: 2023-09-21
Attending: EMERGENCY MEDICINE
Payer: MEDICARE

## 2023-09-21 VITALS
RESPIRATION RATE: 20 BRPM | WEIGHT: 200 LBS | HEART RATE: 61 BPM | BODY MASS INDEX: 31.39 KG/M2 | OXYGEN SATURATION: 100 % | SYSTOLIC BLOOD PRESSURE: 121 MMHG | DIASTOLIC BLOOD PRESSURE: 75 MMHG | HEIGHT: 67 IN

## 2023-09-21 DIAGNOSIS — G43.009 MIGRAINE WITHOUT AURA AND WITHOUT STATUS MIGRAINOSUS, NOT INTRACTABLE: ICD-10-CM

## 2023-09-21 DIAGNOSIS — G40.909 SEIZURE DISORDER (HCC): Primary | ICD-10-CM

## 2023-09-21 LAB
ALBUMIN SERPL-MCNC: 3.4 G/DL (ref 3.4–5)
ALBUMIN/GLOB SERPL: 0.9 {RATIO} (ref 1–2)
ALP LIVER SERPL-CCNC: 57 U/L
ALT SERPL-CCNC: 21 U/L
ANION GAP SERPL CALC-SCNC: 4 MMOL/L (ref 0–18)
AST SERPL-CCNC: 22 U/L (ref 15–37)
BASOPHILS # BLD AUTO: 0.02 X10(3) UL (ref 0–0.2)
BASOPHILS NFR BLD AUTO: 0.4 %
BILIRUB SERPL-MCNC: 0.3 MG/DL (ref 0.1–2)
BUN BLD-MCNC: 17 MG/DL (ref 7–18)
CALCIUM BLD-MCNC: 8.4 MG/DL (ref 8.5–10.1)
CHLORIDE SERPL-SCNC: 113 MMOL/L (ref 98–112)
CO2 SERPL-SCNC: 22 MMOL/L (ref 21–32)
CREAT BLD-MCNC: 1.08 MG/DL
EGFRCR SERPLBLD CKD-EPI 2021: 64 ML/MIN/1.73M2 (ref 60–?)
EOSINOPHIL # BLD AUTO: 0.12 X10(3) UL (ref 0–0.7)
EOSINOPHIL NFR BLD AUTO: 2.1 %
ERYTHROCYTE [DISTWIDTH] IN BLOOD BY AUTOMATED COUNT: 13.5 %
GLOBULIN PLAS-MCNC: 3.7 G/DL (ref 2.8–4.4)
GLUCOSE BLD-MCNC: 94 MG/DL (ref 70–99)
HCT VFR BLD AUTO: 39.3 %
HGB BLD-MCNC: 13.2 G/DL
IMM GRANULOCYTES # BLD AUTO: 0.03 X10(3) UL (ref 0–1)
IMM GRANULOCYTES NFR BLD: 0.5 %
LYMPHOCYTES # BLD AUTO: 1.67 X10(3) UL (ref 1–4)
LYMPHOCYTES NFR BLD AUTO: 29.5 %
MCH RBC QN AUTO: 29.9 PG (ref 26–34)
MCHC RBC AUTO-ENTMCNC: 33.6 G/DL (ref 31–37)
MCV RBC AUTO: 88.9 FL
MONOCYTES # BLD AUTO: 0.47 X10(3) UL (ref 0.1–1)
MONOCYTES NFR BLD AUTO: 8.3 %
NEUTROPHILS # BLD AUTO: 3.35 X10 (3) UL (ref 1.5–7.7)
NEUTROPHILS # BLD AUTO: 3.35 X10(3) UL (ref 1.5–7.7)
NEUTROPHILS NFR BLD AUTO: 59.2 %
OSMOLALITY SERPL CALC.SUM OF ELEC: 289 MOSM/KG (ref 275–295)
PLATELET # BLD AUTO: 212 10(3)UL (ref 150–450)
POTASSIUM SERPL-SCNC: 4 MMOL/L (ref 3.5–5.1)
PROT SERPL-MCNC: 7.1 G/DL (ref 6.4–8.2)
RBC # BLD AUTO: 4.42 X10(6)UL
SODIUM SERPL-SCNC: 139 MMOL/L (ref 136–145)
WBC # BLD AUTO: 5.7 X10(3) UL (ref 4–11)

## 2023-09-21 PROCEDURE — 80053 COMPREHEN METABOLIC PANEL: CPT

## 2023-09-21 PROCEDURE — 96374 THER/PROPH/DIAG INJ IV PUSH: CPT

## 2023-09-21 PROCEDURE — 85025 COMPLETE CBC W/AUTO DIFF WBC: CPT

## 2023-09-21 PROCEDURE — 99284 EMERGENCY DEPT VISIT MOD MDM: CPT

## 2023-09-21 PROCEDURE — 96375 TX/PRO/DX INJ NEW DRUG ADDON: CPT

## 2023-09-21 RX ORDER — KETOROLAC TROMETHAMINE 15 MG/ML
15 INJECTION, SOLUTION INTRAMUSCULAR; INTRAVENOUS ONCE
Status: COMPLETED | OUTPATIENT
Start: 2023-09-21 | End: 2023-09-21

## 2023-09-21 RX ORDER — DIPHENHYDRAMINE HYDROCHLORIDE 50 MG/ML
25 INJECTION INTRAMUSCULAR; INTRAVENOUS ONCE
Status: COMPLETED | OUTPATIENT
Start: 2023-09-21 | End: 2023-09-21

## 2023-09-21 RX ORDER — METOCLOPRAMIDE HYDROCHLORIDE 5 MG/ML
10 INJECTION INTRAMUSCULAR; INTRAVENOUS ONCE
Status: COMPLETED | OUTPATIENT
Start: 2023-09-21 | End: 2023-09-21

## 2023-09-21 NOTE — ED INITIAL ASSESSMENT (HPI)
Pt states that she has had multiple seizures today. Hx of seizures. Per EMS they witnessed a 15 second seizure. No meds administered en route.

## 2023-09-21 NOTE — ED QUICK NOTES
PT REEVALUATED BY ER PHYSICIAN. PT INFORMED OF HER TEST REPORTS AND PLAN OF CARE. VERBALIZING UNDERSTANDING.  PT STATES HER HEADACHE IS BETTER

## 2023-09-21 NOTE — ED QUICK NOTES
While Dr. Abi Alfonso was in the room pt had a pseudo seizures that lasted for 10 seconds, and then continuously talking with md, awake and alert.  No pist ictal state

## 2023-09-25 ENCOUNTER — HOSPITAL ENCOUNTER (EMERGENCY)
Facility: HOSPITAL | Age: 48
Discharge: HOME | End: 2023-09-25
Attending: STUDENT IN AN ORGANIZED HEALTH CARE EDUCATION/TRAINING PROGRAM
Payer: MEDICARE

## 2023-09-25 ENCOUNTER — APPOINTMENT (OUTPATIENT)
Dept: CT IMAGING | Facility: HOSPITAL | Age: 48
End: 2023-09-25
Attending: STUDENT IN AN ORGANIZED HEALTH CARE EDUCATION/TRAINING PROGRAM
Payer: MEDICARE

## 2023-09-25 VITALS
DIASTOLIC BLOOD PRESSURE: 59 MMHG | TEMPERATURE: 98 F | OXYGEN SATURATION: 100 % | HEART RATE: 66 BPM | RESPIRATION RATE: 18 BRPM | SYSTOLIC BLOOD PRESSURE: 93 MMHG

## 2023-09-25 DIAGNOSIS — G43.809 OTHER MIGRAINE WITHOUT STATUS MIGRAINOSUS, NOT INTRACTABLE: ICD-10-CM

## 2023-09-25 DIAGNOSIS — G40.909 SEIZURE DISORDER (HCC): Primary | ICD-10-CM

## 2023-09-25 LAB
ALBUMIN SERPL-MCNC: 3.4 G/DL (ref 3.4–5)
ALBUMIN/GLOB SERPL: 0.9 {RATIO} (ref 1–2)
ALP LIVER SERPL-CCNC: 57 U/L
ALT SERPL-CCNC: 25 U/L
ANION GAP SERPL CALC-SCNC: 5 MMOL/L (ref 0–18)
BASOPHILS # BLD AUTO: 0.02 X10(3) UL (ref 0–0.2)
BASOPHILS NFR BLD AUTO: 0.4 %
BILIRUB SERPL-MCNC: 0.3 MG/DL (ref 0.1–2)
BUN BLD-MCNC: 29 MG/DL (ref 7–18)
CALCIUM BLD-MCNC: 8.9 MG/DL (ref 8.5–10.1)
CHLORIDE SERPL-SCNC: 115 MMOL/L (ref 98–112)
CO2 SERPL-SCNC: 18 MMOL/L (ref 21–32)
CREAT BLD-MCNC: 1.19 MG/DL
EGFRCR SERPLBLD CKD-EPI 2021: 57 ML/MIN/1.73M2 (ref 60–?)
EOSINOPHIL # BLD AUTO: 0.13 X10(3) UL (ref 0–0.7)
EOSINOPHIL NFR BLD AUTO: 2.5 %
ERYTHROCYTE [DISTWIDTH] IN BLOOD BY AUTOMATED COUNT: 13.8 %
GLOBULIN PLAS-MCNC: 4 G/DL (ref 2.8–4.4)
GLUCOSE BLD-MCNC: 93 MG/DL (ref 70–99)
HCT VFR BLD AUTO: 41.8 %
HGB BLD-MCNC: 13.3 G/DL
IMM GRANULOCYTES # BLD AUTO: 0.02 X10(3) UL (ref 0–1)
IMM GRANULOCYTES NFR BLD: 0.4 %
LYMPHOCYTES # BLD AUTO: 1.52 X10(3) UL (ref 1–4)
LYMPHOCYTES NFR BLD AUTO: 29.3 %
MCH RBC QN AUTO: 29 PG (ref 26–34)
MCHC RBC AUTO-ENTMCNC: 31.8 G/DL (ref 31–37)
MCV RBC AUTO: 91.3 FL
MONOCYTES # BLD AUTO: 0.32 X10(3) UL (ref 0.1–1)
MONOCYTES NFR BLD AUTO: 6.2 %
NEUTROPHILS # BLD AUTO: 3.18 X10 (3) UL (ref 1.5–7.7)
NEUTROPHILS # BLD AUTO: 3.18 X10(3) UL (ref 1.5–7.7)
NEUTROPHILS NFR BLD AUTO: 61.2 %
OSMOLALITY SERPL CALC.SUM OF ELEC: 292 MOSM/KG (ref 275–295)
PLATELET # BLD AUTO: 198 10(3)UL (ref 150–450)
POTASSIUM SERPL-SCNC: 4 MMOL/L (ref 3.5–5.1)
PROT SERPL-MCNC: 7.4 G/DL (ref 6.4–8.2)
RBC # BLD AUTO: 4.58 X10(6)UL
SODIUM SERPL-SCNC: 138 MMOL/L (ref 136–145)
WBC # BLD AUTO: 5.2 X10(3) UL (ref 4–11)

## 2023-09-25 PROCEDURE — 85025 COMPLETE CBC W/AUTO DIFF WBC: CPT | Performed by: STUDENT IN AN ORGANIZED HEALTH CARE EDUCATION/TRAINING PROGRAM

## 2023-09-25 PROCEDURE — 70450 CT HEAD/BRAIN W/O DYE: CPT | Performed by: STUDENT IN AN ORGANIZED HEALTH CARE EDUCATION/TRAINING PROGRAM

## 2023-09-25 PROCEDURE — 99284 EMERGENCY DEPT VISIT MOD MDM: CPT

## 2023-09-25 PROCEDURE — 96374 THER/PROPH/DIAG INJ IV PUSH: CPT

## 2023-09-25 PROCEDURE — 99285 EMERGENCY DEPT VISIT HI MDM: CPT

## 2023-09-25 PROCEDURE — 80053 COMPREHEN METABOLIC PANEL: CPT | Performed by: STUDENT IN AN ORGANIZED HEALTH CARE EDUCATION/TRAINING PROGRAM

## 2023-09-25 PROCEDURE — 96375 TX/PRO/DX INJ NEW DRUG ADDON: CPT

## 2023-09-25 RX ORDER — METOCLOPRAMIDE HYDROCHLORIDE 5 MG/ML
10 INJECTION INTRAMUSCULAR; INTRAVENOUS ONCE
Status: COMPLETED | OUTPATIENT
Start: 2023-09-25 | End: 2023-09-25

## 2023-09-25 RX ORDER — KETOROLAC TROMETHAMINE 15 MG/ML
15 INJECTION, SOLUTION INTRAMUSCULAR; INTRAVENOUS ONCE
Status: COMPLETED | OUTPATIENT
Start: 2023-09-25 | End: 2023-09-25

## 2023-09-25 RX ORDER — DIBUCAINE 0.28 G/28G
1 OINTMENT TOPICAL 3 TIMES DAILY
Qty: 28 G | Refills: 0 | Status: SHIPPED | OUTPATIENT
Start: 2023-09-25 | End: 2023-09-25 | Stop reason: CLARIF

## 2023-09-25 RX ORDER — DIPHENHYDRAMINE HYDROCHLORIDE 50 MG/ML
25 INJECTION INTRAMUSCULAR; INTRAVENOUS ONCE
Status: COMPLETED | OUTPATIENT
Start: 2023-09-25 | End: 2023-09-25

## 2023-10-11 ENCOUNTER — HOSPITAL ENCOUNTER (OUTPATIENT)
Age: 48
Discharge: HOME | End: 2023-10-11
Payer: MEDICARE

## 2023-10-11 VITALS
RESPIRATION RATE: 16 BRPM | DIASTOLIC BLOOD PRESSURE: 63 MMHG | HEART RATE: 73 BPM | SYSTOLIC BLOOD PRESSURE: 106 MMHG | TEMPERATURE: 98 F | OXYGEN SATURATION: 98 %

## 2023-10-11 DIAGNOSIS — H92.01 EAR PAIN, RIGHT: Primary | ICD-10-CM

## 2023-10-11 LAB
S PYO AG THROAT QL: NEGATIVE
SARS-COV-2 RNA RESP QL NAA+PROBE: NOT DETECTED

## 2023-10-11 PROCEDURE — U0002 COVID-19 LAB TEST NON-CDC: HCPCS | Performed by: NURSE PRACTITIONER

## 2023-10-11 PROCEDURE — 99213 OFFICE O/P EST LOW 20 MIN: CPT | Performed by: NURSE PRACTITIONER

## 2023-10-11 PROCEDURE — 87880 STREP A ASSAY W/OPTIC: CPT | Performed by: NURSE PRACTITIONER

## 2023-10-11 PROCEDURE — A9150 MISC/EXPER NON-PRESCRIPT DRU: HCPCS | Performed by: NURSE PRACTITIONER

## 2023-10-11 RX ORDER — ACETAMINOPHEN 500 MG
1000 TABLET ORAL ONCE
Status: COMPLETED | OUTPATIENT
Start: 2023-10-11 | End: 2023-10-11

## 2023-10-11 RX ORDER — NEOMYCIN SULFATE, POLYMYXIN B SULFATE AND HYDROCORTISONE 10; 3.5; 1 MG/ML; MG/ML; [USP'U]/ML
SUSPENSION/ DROPS AURICULAR (OTIC)
Qty: 10 ML | Refills: 0 | Status: SHIPPED | OUTPATIENT
Start: 2023-10-11

## 2023-10-11 NOTE — DISCHARGE INSTRUCTIONS
You may apply the Cortisporin eardrops into your right ear as directed. Sometimes placing a warm compress or heating pad on the outside of your ear may help alleviate discomfort within the ear      -Drink plenty of fluids. Use a humidifier.   -Get plenty of rest.   Acetaminophen or ibuprofen if needed for fever or body aches. Only take this medication if you are not allergic to it or have no other contraindications to taking this medicine. For your sore throat, you may gargle with salt water (1/2- 1 tsp of salt in 8 oz of warm water) or lozenges / throat sprays for throat discomfort. Seek immediate medical care if your symptoms are persistent or worsening: spiking fevers, chest pain, worsening shortness of breath, weakness, fatigue, worsening cough, feeling dehydrated.

## 2023-10-11 NOTE — ED INITIAL ASSESSMENT (HPI)
Fri Pt started with a migraine, Sat-Mon fever (Tmax 101)    10/10 Pt started with valentino ear pain and sore throat

## 2023-10-17 ENCOUNTER — HOSPITAL ENCOUNTER (OUTPATIENT)
Age: 48
Discharge: HOME | End: 2023-10-17
Payer: MEDICARE

## 2023-10-17 VITALS
TEMPERATURE: 97 F | RESPIRATION RATE: 16 BRPM | SYSTOLIC BLOOD PRESSURE: 118 MMHG | BODY MASS INDEX: 31.39 KG/M2 | DIASTOLIC BLOOD PRESSURE: 74 MMHG | OXYGEN SATURATION: 95 % | HEART RATE: 64 BPM | HEIGHT: 67 IN | WEIGHT: 200 LBS

## 2023-10-17 DIAGNOSIS — R09.82 PND (POST-NASAL DRIP): ICD-10-CM

## 2023-10-17 DIAGNOSIS — H66.002 ACUTE SUPPURATIVE OTITIS MEDIA OF LEFT EAR WITHOUT SPONTANEOUS RUPTURE OF TYMPANIC MEMBRANE, RECURRENCE NOT SPECIFIED: Primary | ICD-10-CM

## 2023-10-17 PROCEDURE — 99213 OFFICE O/P EST LOW 20 MIN: CPT | Performed by: PHYSICIAN ASSISTANT

## 2023-10-17 RX ORDER — IBUPROFEN 600 MG/1
600 TABLET ORAL ONCE
Status: COMPLETED | OUTPATIENT
Start: 2023-10-17 | End: 2023-10-17

## 2023-10-17 RX ORDER — PREDNISONE 20 MG/1
40 TABLET ORAL DAILY
Qty: 6 TABLET | Refills: 0 | Status: SHIPPED | OUTPATIENT
Start: 2023-10-17 | End: 2023-10-20

## 2023-10-17 RX ORDER — DEXAMETHASONE 4 MG/1
10 TABLET ORAL ONCE
Status: COMPLETED | OUTPATIENT
Start: 2023-10-17 | End: 2023-10-17

## 2023-10-17 RX ORDER — AMOXICILLIN 500 MG/1
500 TABLET, FILM COATED ORAL 2 TIMES DAILY
Qty: 20 TABLET | Refills: 0 | Status: SHIPPED | OUTPATIENT
Start: 2023-10-17 | End: 2023-10-27

## 2023-10-17 NOTE — DISCHARGE INSTRUCTIONS
Please return to the ER/clinic if symptoms worsen. Follow-up with your PCP in 24-48 hours as needed. The decadron to work in your system the next several days. Push fluids. Gargle with warm saline rinses. Sleep more upright. Use Chloraseptic spray. We will give some additional prednisone to start on day 2 or 3 if symptoms persist.  Take the full course of antibiotics as prescribed in tandem with a probiotic daily. Definitely  at least a 30-day supply of Zyrtec to take daily. If symptoms persist or worsen make a follow-up appointment with ENT for further evaluation and treatment.

## 2023-10-22 ENCOUNTER — HOSPITAL ENCOUNTER (EMERGENCY)
Facility: HOSPITAL | Age: 48
Discharge: HOME | End: 2023-10-22
Attending: EMERGENCY MEDICINE
Payer: MEDICARE

## 2023-10-22 VITALS
WEIGHT: 199.94 LBS | TEMPERATURE: 97 F | RESPIRATION RATE: 18 BRPM | HEART RATE: 76 BPM | SYSTOLIC BLOOD PRESSURE: 123 MMHG | DIASTOLIC BLOOD PRESSURE: 80 MMHG | HEIGHT: 67 IN | BODY MASS INDEX: 31.38 KG/M2 | OXYGEN SATURATION: 97 %

## 2023-10-22 DIAGNOSIS — H92.03 OTALGIA OF BOTH EARS: Primary | ICD-10-CM

## 2023-10-22 PROCEDURE — 99283 EMERGENCY DEPT VISIT LOW MDM: CPT

## 2023-10-22 RX ORDER — HYDROCODONE BITARTRATE AND ACETAMINOPHEN 5; 325 MG/1; MG/1
1-2 TABLET ORAL EVERY 6 HOURS PRN
Qty: 20 TABLET | Refills: 0 | Status: SHIPPED | OUTPATIENT
Start: 2023-10-22 | End: 2023-10-27

## 2023-10-22 NOTE — ED INITIAL ASSESSMENT (HPI)
Bilateral ear pressure and sore throat. Pt is on abx, prednisone, zyrtec and has been to urgent care x2. States that she has been tested negative for covid, strep, and has ear drops. Pt is supposed to call ENT in the morning, but states that the ear pressure has gotten worse over the past 2 weeks and she is unable to tolerate it because she is epileptic.

## 2023-12-18 NOTE — PLAN OF CARE
Assumed care of pt. At [address] Pt. Drowsy , but oriented x4. More alert overnight. VSS. Neuro checks Q2H/Q3H noc, HUDSON, follows commands, see flowsheets. C/o moderate HA pain, relieved w/ PRN medication. See MAR. Continuous EEG at bedside. Voiding via purwick. Will continue to monitor closely. 15-Dec-2023 16:49 15-Dec-2023 22:28 18-Dec-2023 15:19

## 2023-12-28 PROBLEM — T14.91XA SUICIDE ATTEMPT (HCC): Status: ACTIVE | Noted: 2023-12-28

## 2023-12-28 PROBLEM — F41.9 ANXIETY DISORDER: Status: ACTIVE | Noted: 2023-12-28

## 2023-12-28 PROBLEM — M62.82 NON-TRAUMATIC RHABDOMYOLYSIS: Status: ACTIVE | Noted: 2023-12-28

## 2023-12-28 PROBLEM — U07.1 COVID-19 VIRUS INFECTION: Status: ACTIVE | Noted: 2023-12-28

## 2023-12-28 PROBLEM — T50.902A POLYSUBSTANCE OVERDOSE, INTENTIONAL SELF-HARM, INITIAL ENCOUNTER (HCC): Status: ACTIVE | Noted: 2023-12-28

## 2023-12-30 PROBLEM — T50.901A POLYSUBSTANCE OVERDOSE: Status: ACTIVE | Noted: 2023-12-30

## 2023-12-30 PROBLEM — F29 PSYCHOSIS (HCC): Status: ACTIVE | Noted: 2023-12-30

## 2023-12-30 PROBLEM — F33.2 MAJOR DEPRESSIVE DISORDER, RECURRENT SEVERE WITHOUT PSYCHOTIC FEATURES (HCC): Status: ACTIVE | Noted: 2023-12-30

## 2024-04-04 ENCOUNTER — HOSPITAL ENCOUNTER (OUTPATIENT)
Age: 49
Discharge: HOME | End: 2024-04-04
Payer: MEDICARE

## 2024-04-04 VITALS
RESPIRATION RATE: 20 BRPM | HEART RATE: 80 BPM | DIASTOLIC BLOOD PRESSURE: 69 MMHG | TEMPERATURE: 98 F | SYSTOLIC BLOOD PRESSURE: 134 MMHG | OXYGEN SATURATION: 97 %

## 2024-04-04 DIAGNOSIS — N39.0 URINARY TRACT INFECTION WITHOUT HEMATURIA, SITE UNSPECIFIED: ICD-10-CM

## 2024-04-04 DIAGNOSIS — R11.0 NAUSEA: Primary | ICD-10-CM

## 2024-04-04 LAB
B-HCG UR QL: NEGATIVE
BILIRUB UR QL STRIP: NEGATIVE
COLOR UR: YELLOW
GLUCOSE UR STRIP-MCNC: NEGATIVE MG/DL
HGB UR QL STRIP: NEGATIVE
KETONES UR STRIP-MCNC: NEGATIVE MG/DL
NITRITE UR QL STRIP: NEGATIVE
PH UR STRIP: 6 [PH]
POCT HEMOCCULT: NEGATIVE
PROT UR STRIP-MCNC: NEGATIVE MG/DL
SP GR UR STRIP: >=1.03
UROBILINOGEN UR STRIP-ACNC: <2 MG/DL

## 2024-04-04 PROCEDURE — 99214 OFFICE O/P EST MOD 30 MIN: CPT | Performed by: PHYSICIAN ASSISTANT

## 2024-04-04 PROCEDURE — S0119 ONDANSETRON 4 MG: HCPCS | Performed by: PHYSICIAN ASSISTANT

## 2024-04-04 PROCEDURE — 81002 URINALYSIS NONAUTO W/O SCOPE: CPT | Performed by: PHYSICIAN ASSISTANT

## 2024-04-04 PROCEDURE — 82272 OCCULT BLD FECES 1-3 TESTS: CPT | Performed by: PHYSICIAN ASSISTANT

## 2024-04-04 PROCEDURE — 81025 URINE PREGNANCY TEST: CPT | Performed by: PHYSICIAN ASSISTANT

## 2024-04-04 RX ORDER — ONDANSETRON 4 MG/1
4 TABLET, ORALLY DISINTEGRATING ORAL ONCE
Status: COMPLETED | OUTPATIENT
Start: 2024-04-04 | End: 2024-04-04

## 2024-04-04 RX ORDER — ONDANSETRON 4 MG/1
4 TABLET, ORALLY DISINTEGRATING ORAL EVERY 4 HOURS PRN
Qty: 10 TABLET | Refills: 0 | Status: SHIPPED | OUTPATIENT
Start: 2024-04-04 | End: 2024-04-11

## 2024-04-04 RX ORDER — NITROFURANTOIN 25; 75 MG/1; MG/1
100 CAPSULE ORAL 2 TIMES DAILY
Qty: 14 CAPSULE | Refills: 0 | Status: SHIPPED | OUTPATIENT
Start: 2024-04-04 | End: 2024-04-11

## 2024-04-04 RX ORDER — ONDANSETRON 2 MG/ML
4 INJECTION INTRAMUSCULAR; INTRAVENOUS ONCE
Status: DISCONTINUED | OUTPATIENT
Start: 2024-04-04 | End: 2024-04-04

## 2024-04-04 RX ORDER — SODIUM CHLORIDE 9 MG/ML
1000 INJECTION, SOLUTION INTRAVENOUS ONCE
Status: DISCONTINUED | OUTPATIENT
Start: 2024-04-04 | End: 2024-04-04

## 2024-04-04 NOTE — ED INITIAL ASSESSMENT (HPI)
Patient states nausea for 3 days and now has black diarrhea.  2 bouts of diarrhea  Not tolerating Zofran- states took 2 doses yesterday and today with no relief

## 2024-04-04 NOTE — ED PROVIDER NOTES
Patient Seen in: [facility]mediate [facility]      History     Chief Complaint   Patient presents with    Nausea     Stated Complaint: nausea x 3 days    Subjective:   HPI    Patient complains of persistent nausea x 3 days.  Denies vomiting.  States that she has had 2 episodes of diarrhea that were black.  Patient states that she has been taking Pepto-Bismol over-the-counter for her symptoms.  Denies abdominal pain/cramping.  Denies UTI symptoms.  Denies fevers, chills.  Denies close sick contacts.  Denies any other complaints or concerns at this time.    Objective:   Past Medical History:   Diagnosis Date    Anxiety state     Back problem     Chronic pain     CKD (chronic kidney disease) stage 3, GFR 30-59 ml/min ([facility])     Creatinine 1.23 and GFR 53 ED visit 8/28/2020    Depression     Epilepsy ([facility])     Migraines     botox , and nerve blocks     Ovarian cyst 08/2021    left 3.2cm  seen on CT in ER    Pneumonia due to COVID-19 virus     8/20    Pneumonia due to organism     covid 11/2020 , had to hospitalized 4 days     Pseudoseizure     Seizure disorder ([facility])     last seizure 2 weeks ago              Past Surgical History:   Procedure Laterality Date    APPENDECTOMY      APPENDECTOMY      BRAIN SURGERY      neurostim implanted./ removed     EGD N/A 09/23/2021    Procedure: ESOPHAGOGASTRODUODENOSCOPY w/ bxs, COLONOSCOPY w/ bxs;  Surgeon: Phil Murphy MD;  Location: [facility]    NEUROSTIMULATOR ELECTRODE      2 yrs ago    OTHER      frontal lobectomy    OTHER      facial surgery    OTHER      shoulder replacement    OTHER      cervical fusion    OTHER      frontal and temporal lobectomies for seizures    OTHER      bilat shoulder surgery    OTHER      Neurostimulator history.  This was removed but wires are still present.  Unable to get brain MRI    OTHER SURGICAL HISTORY      cervical fusion    OTHER SURGICAL HISTORY      shoulder replacement    OTHER SURGICAL HISTORY      complete jaw  reconstruction     SPINE SURGERY PROCEDURE UNLISTED      cervical fusion     TONSILLECTOMY      TOTAL SHOULDER ARTHROPLASTY                  Social History     Socioeconomic History    Marital status: Life Partner   Tobacco Use    Smoking status: Never     Passive exposure: Never    Smokeless tobacco: Never   Vaping Use    Vaping Use: Never used   Substance and Sexual Activity    Alcohol use: Yes     Alcohol/week: 1.0 standard drink of alcohol     Types: 1 Standard drinks or equivalent per week    Drug use: Yes     Types: Cannabis     Comment: nightly    Sexual activity: Yes     Partners: Female   Other Topics Concern    Caffeine Concern No    Exercise Yes    Seat Belt Yes   Social History Narrative    ** Merged History Encounter **          Social Determinants of Health     Food Insecurity: Unknown (12/28/2023)    Food Insecurity     Food Insecurity: Patient unable to answer   Transportation Needs: Unknown (12/28/2023)    Transportation Needs     Lack of Transportation: Patient unable to answer   Housing Stability: Unknown (12/28/2023)    Housing Stability     Housing Instability: Patient unable to answer              Review of Systems    Positive for stated complaint: nausea x 3 days  Other systems are as noted in HPI.  Constitutional and vital signs reviewed.      All other systems reviewed and negative except as noted above.    Physical Exam     ED Triage Vitals [04/04/24 1557]   /69   Pulse 80   Resp 20   Temp 97.9 °F (36.6 °C)   Temp src Temporal   SpO2 97 %   O2 Device None (Room air)       Current:/69   Pulse 80   Temp 97.9 °F (36.6 °C) (Temporal)   Resp 20   LMP 03/18/2024 (Exact Date)   SpO2 97%         Physical Exam  Vitals and nursing note reviewed.   Constitutional:       Appearance: Normal appearance.   HENT:      Mouth/Throat:      Mouth: Mucous membranes are moist.      Pharynx: No posterior oropharyngeal erythema.   Eyes:      Conjunctiva/sclera: Conjunctivae normal.    Cardiovascular:      Rate and Rhythm: Normal rate and regular rhythm.      Heart sounds: Normal heart sounds.   Pulmonary:      Effort: Pulmonary effort is normal.      Breath sounds: Normal breath sounds.   Abdominal:      General: Abdomen is flat. Bowel sounds are normal.      Tenderness: There is no abdominal tenderness. There is no guarding or rebound.   Genitourinary:     Rectum: Guaiac result negative.      Comments: Cathy Jimenez, RN, acted as chaperone for exam.  Skin:     General: Skin is warm and dry.   Neurological:      General: No focal deficit present.      Mental Status: She is alert.   Psychiatric:         Mood and Affect: Mood normal.               ED Course     Labs Reviewed   [facility] POCT URINALYSIS DIPSTICK - Abnormal; Notable for the following components:       Result Value    Urine Clarity Slightly cloudy (*)     Leukocyte esterase urine Small (*)     All other components within normal limits   POCT HEMOCCULT - Normal   POCT PREGNANCY URINE - Normal   URINE CULTURE, ROUTINE                      MDM      Differential diagnosis includes but is not limited to gastroenteritis, colitis, C. difficile, GI bleed, UTI.    Patient well-appearing, nontoxic, vital stable.  Abdominal exam is benign.  Hemoccult negative.  After multiple attempts at an IV in this clinic we were unable to obtain access or get blood.  Patient is feeling better after p.o. Zofran here.  Clinically she is well-appearing and does not appear significantly dehydrated.  Discussed with patient unable to run labs to rule out any electrolyte abnormalities or other abnormal findings.  Discussed UA results and plan to start on antibiotics, culture pending.  Will provide new prescription for Zofran additionally.  Patient feels comfortable with this plan going home.  I advised that if symptoms do not improve or if she develops new/worsening symptoms she is to go directly to the ER for evaluation.  Patient verbalized understanding agreement of  plan.    This report has been produced using speech recognition software and may contain errors related to that system including, but not limited to, errors in grammar, punctuation, and spelling, as well as words and phrases that possibly may have been recognized inappropriately.  If there are any questions or concerns, contact the dictating provider for clarification.     NOTE: The 21st Century Cares Act makes medical notes available to patients.  Be advised that this is a medical document written in medical language and may contain abbreviations or verbiage that is unfamiliar or direct.  It is primarily intended to carry relevant historical information, physical exam findings, and the clinical assessment of the physician.                                     Medical Decision Making  Risk  Prescription drug management.        Disposition and Plan     Clinical Impression:  1. Nausea    2. Urinary tract infection without hematuria, site unspecified         Disposition:  Discharge  4/4/2024  5:50 pm    Follow-up:  Nara Harkins MD  [address] [place] [address]  [address]  [facility] [address]  [phone]    In 3 days            Medications Prescribed:  Discharge Medication List as of 4/4/2024  6:00 PM        START taking these medications    Details   ondansetron 4 MG Oral Tablet Dispersible Take 1 tablet (4 mg total) by mouth every 4 (four) hours as needed for Nausea., Normal, Disp-10 tablet, R-0      nitrofurantoin monohydrate macro 100 MG Oral Cap Take 1 capsule (100 mg total) by mouth 2 (two) times daily for 7 days., Normal, Disp-14 capsule, R-0

## 2024-04-06 NOTE — ED QUICK NOTES
Pt requested many iv attempt, stated they needed to stick her 6 times and finally may get it in her thumb.  I was able to cannulate, but blood return stops and unable to advance.  I attempted 3x and Deep MONET attempted once and none were successful

## 2024-04-08 ENCOUNTER — HOSPITAL ENCOUNTER (EMERGENCY)
Facility: HOSPITAL | Age: 49
Discharge: HOME | End: 2024-04-08
Attending: EMERGENCY MEDICINE
Payer: MEDICARE

## 2024-04-08 VITALS
SYSTOLIC BLOOD PRESSURE: 115 MMHG | TEMPERATURE: 98 F | OXYGEN SATURATION: 97 % | WEIGHT: 188 LBS | RESPIRATION RATE: 20 BRPM | DIASTOLIC BLOOD PRESSURE: 70 MMHG | BODY MASS INDEX: 29 KG/M2 | HEART RATE: 65 BPM

## 2024-04-08 DIAGNOSIS — K29.00 ACUTE GASTRITIS WITHOUT HEMORRHAGE, UNSPECIFIED GASTRITIS TYPE: Primary | ICD-10-CM

## 2024-04-08 DIAGNOSIS — E86.0 DEHYDRATION: ICD-10-CM

## 2024-04-08 LAB
ALBUMIN SERPL-MCNC: 3.7 G/DL (ref 3.4–5)
ALBUMIN/GLOB SERPL: 1.1 {RATIO} (ref 1–2)
ALP LIVER SERPL-CCNC: 54 U/L
ALT SERPL-CCNC: 13 U/L
ANION GAP SERPL CALC-SCNC: 9 MMOL/L (ref 0–18)
AST SERPL-CCNC: 15 U/L (ref 15–37)
B-HCG UR QL: NEGATIVE
BASOPHILS # BLD AUTO: 0.02 X10(3) UL (ref 0–0.2)
BASOPHILS NFR BLD AUTO: 0.4 %
BILIRUB SERPL-MCNC: 0.2 MG/DL (ref 0.1–2)
BILIRUB UR QL STRIP.AUTO: NEGATIVE
BUN BLD-MCNC: 17 MG/DL (ref 9–23)
CALCIUM BLD-MCNC: 8.3 MG/DL (ref 8.5–10.1)
CHLORIDE SERPL-SCNC: 115 MMOL/L (ref 98–112)
CO2 SERPL-SCNC: 17 MMOL/L (ref 21–32)
COLOR UR AUTO: YELLOW
CREAT BLD-MCNC: 1.07 MG/DL
EGFRCR SERPLBLD CKD-EPI 2021: 64 ML/MIN/1.73M2 (ref 60–?)
EOSINOPHIL # BLD AUTO: 0.12 X10(3) UL (ref 0–0.7)
EOSINOPHIL NFR BLD AUTO: 2.2 %
ERYTHROCYTE [DISTWIDTH] IN BLOOD BY AUTOMATED COUNT: 13.3 %
GLOBULIN PLAS-MCNC: 3.5 G/DL (ref 2.8–4.4)
GLUCOSE BLD-MCNC: 94 MG/DL (ref 70–99)
GLUCOSE UR STRIP.AUTO-MCNC: NORMAL MG/DL
HCT VFR BLD AUTO: 39.4 %
HGB BLD-MCNC: 13.1 G/DL
IMM GRANULOCYTES # BLD AUTO: 0.01 X10(3) UL (ref 0–1)
IMM GRANULOCYTES NFR BLD: 0.2 %
KETONES UR STRIP.AUTO-MCNC: NEGATIVE MG/DL
LEUKOCYTE ESTERASE UR QL STRIP.AUTO: 75
LIPASE SERPL-CCNC: 65 U/L (ref 13–75)
LYMPHOCYTES # BLD AUTO: 1.94 X10(3) UL (ref 1–4)
LYMPHOCYTES NFR BLD AUTO: 36.2 %
MCH RBC QN AUTO: 30.1 PG (ref 26–34)
MCHC RBC AUTO-ENTMCNC: 33.2 G/DL (ref 31–37)
MCV RBC AUTO: 90.6 FL
MONOCYTES # BLD AUTO: 0.4 X10(3) UL (ref 0.1–1)
MONOCYTES NFR BLD AUTO: 7.5 %
NEUTROPHILS # BLD AUTO: 2.87 X10 (3) UL (ref 1.5–7.7)
NEUTROPHILS # BLD AUTO: 2.87 X10(3) UL (ref 1.5–7.7)
NEUTROPHILS NFR BLD AUTO: 53.5 %
NITRITE UR QL STRIP.AUTO: NEGATIVE
OSMOLALITY SERPL CALC.SUM OF ELEC: 293 MOSM/KG (ref 275–295)
PH UR STRIP.AUTO: 7 [PH] (ref 5–8)
PLATELET # BLD AUTO: 212 10(3)UL (ref 150–450)
POTASSIUM SERPL-SCNC: 3.5 MMOL/L (ref 3.5–5.1)
PROT SERPL-MCNC: 7.2 G/DL (ref 6.4–8.2)
PROT UR STRIP.AUTO-MCNC: 20 MG/DL
RBC # BLD AUTO: 4.35 X10(6)UL
RBC UR QL AUTO: NEGATIVE
SODIUM SERPL-SCNC: 141 MMOL/L (ref 136–145)
SP GR UR STRIP.AUTO: 1.03 (ref 1–1.03)
UROBILINOGEN UR STRIP.AUTO-MCNC: 2 MG/DL
WBC # BLD AUTO: 5.4 X10(3) UL (ref 4–11)

## 2024-04-08 PROCEDURE — 99284 EMERGENCY DEPT VISIT MOD MDM: CPT

## 2024-04-08 PROCEDURE — S0028 INJECTION, FAMOTIDINE, 20 MG: HCPCS | Performed by: EMERGENCY MEDICINE

## 2024-04-08 PROCEDURE — 93005 ELECTROCARDIOGRAM TRACING: CPT

## 2024-04-08 PROCEDURE — 83690 ASSAY OF LIPASE: CPT

## 2024-04-08 PROCEDURE — 81001 URINALYSIS AUTO W/SCOPE: CPT | Performed by: EMERGENCY MEDICINE

## 2024-04-08 PROCEDURE — 85025 COMPLETE CBC W/AUTO DIFF WBC: CPT

## 2024-04-08 PROCEDURE — 96361 HYDRATE IV INFUSION ADD-ON: CPT

## 2024-04-08 PROCEDURE — 96375 TX/PRO/DX INJ NEW DRUG ADDON: CPT

## 2024-04-08 PROCEDURE — 85025 COMPLETE CBC W/AUTO DIFF WBC: CPT | Performed by: EMERGENCY MEDICINE

## 2024-04-08 PROCEDURE — 83690 ASSAY OF LIPASE: CPT | Performed by: EMERGENCY MEDICINE

## 2024-04-08 PROCEDURE — 80053 COMPREHEN METABOLIC PANEL: CPT

## 2024-04-08 PROCEDURE — 81025 URINE PREGNANCY TEST: CPT

## 2024-04-08 PROCEDURE — 96374 THER/PROPH/DIAG INJ IV PUSH: CPT

## 2024-04-08 PROCEDURE — 82272 OCCULT BLD FECES 1-3 TESTS: CPT

## 2024-04-08 PROCEDURE — 80053 COMPREHEN METABOLIC PANEL: CPT | Performed by: EMERGENCY MEDICINE

## 2024-04-08 PROCEDURE — 93010 ELECTROCARDIOGRAM REPORT: CPT

## 2024-04-08 RX ORDER — LURASIDONE HYDROCHLORIDE 20 MG/1
20 TABLET, FILM COATED ORAL
COMMUNITY

## 2024-04-08 RX ORDER — PANTOPRAZOLE SODIUM 40 MG/1
40 TABLET, DELAYED RELEASE ORAL DAILY
Qty: 30 TABLET | Refills: 0 | Status: SHIPPED | OUTPATIENT
Start: 2024-04-08 | End: 2024-05-08

## 2024-04-08 RX ORDER — LORAZEPAM 1 MG/1
1 TABLET ORAL EVERY 4 HOURS PRN
COMMUNITY

## 2024-04-08 RX ORDER — TRAZODONE HYDROCHLORIDE 100 MG/1
100 TABLET ORAL NIGHTLY
COMMUNITY

## 2024-04-08 RX ORDER — ALPRAZOLAM 1 MG/1
1 TABLET, EXTENDED RELEASE ORAL EVERY MORNING
COMMUNITY

## 2024-04-08 RX ORDER — ZOLPIDEM TARTRATE 10 MG/1
10 TABLET ORAL NIGHTLY PRN
COMMUNITY

## 2024-04-08 RX ORDER — ONDANSETRON 2 MG/ML
4 INJECTION INTRAMUSCULAR; INTRAVENOUS ONCE
Status: COMPLETED | OUTPATIENT
Start: 2024-04-08 | End: 2024-04-08

## 2024-04-08 RX ORDER — FAMOTIDINE 10 MG/ML
20 INJECTION, SOLUTION INTRAVENOUS ONCE
Status: COMPLETED | OUTPATIENT
Start: 2024-04-08 | End: 2024-04-08

## 2024-04-08 RX ORDER — LACOSAMIDE 100 MG/1
100 TABLET ORAL 2 TIMES DAILY
COMMUNITY

## 2024-04-08 NOTE — ED INITIAL ASSESSMENT (HPI)
Pt to ER for \"severe Nausea' since last weds pt denies any abdominal pain or cramps. Pt reports of black stools since Saturday. Seen at immediate care when she received an rectal exam and urinalysis. IC was unable to draw labs due to pt being a hard stick.

## 2024-04-09 LAB
ATRIAL RATE: 60 BPM
P AXIS: 44 DEGREES
P-R INTERVAL: 162 MS
Q-T INTERVAL: 420 MS
QRS DURATION: 82 MS
QTC CALCULATION (BEZET): 420 MS
R AXIS: 4 DEGREES
T AXIS: 15 DEGREES
VENTRICULAR RATE: 60 BPM

## 2024-04-09 NOTE — ED PROVIDER NOTES
Patient Seen in: [facility] Emergency Department      History     Chief Complaint   Patient presents with    Nausea/vomiting     Stated Complaint: nausea since last wednesday    Subjective:   HPI    Patient is a 48-year-old female who presents emergency department has been having severe nausea since last Wednesday.  Denies any abdominal pain or cramps.  States she noticed her stools turning black since Sunday.  Went to an immediate care received a rectal exam and urinalysis but they were unable to draw blood due to her being a hard stick and sent her here for further evaluation.  Patient denies any history of GI bleeds.  No other complaints.    Objective:   Past Medical History:   Diagnosis Date    Anxiety state     Back problem     Chronic pain     CKD (chronic kidney disease) stage 3, GFR 30-59 ml/min ([facility])     Creatinine 1.23 and GFR 53 ED visit 8/28/2020    Depression     Epilepsy ([facility])     Migraines     botox , and nerve blocks     Ovarian cyst 08/2021    left 3.2cm  seen on CT in ER    Pneumonia due to COVID-19 virus     8/20    Pneumonia due to organism     covid 11/2020 , had to hospitalized 4 days     Pseudoseizure     Seizure disorder ([facility])     last seizure 2 weeks ago              Past Surgical History:   Procedure Laterality Date    APPENDECTOMY      APPENDECTOMY      BRAIN SURGERY      neurostim implanted./ removed     EGD N/A 09/23/2021    Procedure: ESOPHAGOGASTRODUODENOSCOPY w/ bxs, COLONOSCOPY w/ bxs;  Surgeon: Phil Murphy MD;  Location: [facility] [place]    NEUROSTIMULATOR ELECTRODE      2 yrs ago    OTHER      frontal lobectomy    OTHER      facial surgery    OTHER      shoulder replacement    OTHER      cervical fusion    OTHER      frontal and temporal lobectomies for seizures    OTHER      bilat shoulder surgery    OTHER      Neurostimulator history.  This was removed but wires are still present.  Unable to get brain MRI    OTHER SURGICAL HISTORY      cervical fusion     OTHER SURGICAL HISTORY      shoulder replacement    OTHER SURGICAL HISTORY      complete jaw reconstruction     SPINE SURGERY PROCEDURE UNLISTED      cervical fusion     TONSILLECTOMY      TOTAL SHOULDER ARTHROPLASTY                  Social History     Socioeconomic History    Marital status: Life Partner   Tobacco Use    Smoking status: Never     Passive exposure: Never    Smokeless tobacco: Never   Vaping Use    Vaping Use: Never used   Substance and Sexual Activity    Alcohol use: Yes     Alcohol/week: 1.0 standard drink of alcohol     Types: 1 Standard drinks or equivalent per week    Drug use: Yes     Types: Cannabis     Comment: nightly    Sexual activity: Yes     Partners: Female   Other Topics Concern    Caffeine Concern No    Exercise Yes    Seat Belt Yes   Social History Narrative    ** Merged History Encounter **          Social Determinants of Health     Food Insecurity: Unknown (12/28/2023)    Food Insecurity     Food Insecurity: Patient unable to answer   Transportation Needs: Unknown (12/28/2023)    Transportation Needs     Lack of Transportation: Patient unable to answer   Housing Stability: Unknown (12/28/2023)    Housing Stability     Housing Instability: Patient unable to answer              Review of Systems    Positive for stated complaint: nausea since last wednesday  Other systems are as noted in HPI.  Constitutional and vital signs reviewed.      All other systems reviewed and negative except as noted above.    Physical Exam     ED Triage Vitals [04/08/24 1754]   /83   Pulse 70   Resp 20   Temp 98.1 °F (36.7 °C)   Temp src    SpO2 97 %   O2 Device None (Room air)       Current:/74   Pulse 61   Temp 98.1 °F (36.7 °C)   Resp 20   Wt 85.3 kg   LMP 03/18/2024 (Exact Date)   SpO2 97%   BMI 29.44 kg/m²         Physical Exam      Vital signs reviewed  General appearance: Patient is alert and in no acute distress  HEENT: Pupils equal react to light extraocular muscles intact no  scleral icterus, mucous membranes are moist, there is no erythema or exudate in the posterior pharynx  Neck: Supple no JVD no lymphadenopathy no meningismus no carotid bruit  CV: Regular rate and rhythm no murmur rub  Respiratory: Clear to auscultation bilaterally no crackles no wheezes no accessory muscle use  Abdomen: Soft nontender nondistended, no rebound no guarding  no hepatosplenomegaly bowel sounds are present , no pulsatile mass  Extremities: No clubbing cyanosis or edema 2+ distal pulses.  Neuro: Cranial nerves II through XII intact with no gross focal sensory or motor abnormality.      ED Course     Labs Reviewed   COMP METABOLIC PANEL (14) - Abnormal; Notable for the following components:       Result Value    Chloride 115 (*)     CO2 17.0 (*)     Creatinine 1.07 (*)     Calcium, Total 8.3 (*)     All other components within normal limits   URINALYSIS, ROUTINE - Abnormal; Notable for the following components:    Clarity Urine Turbid (*)     Protein Urine 20 (*)     Urobilinogen Urine 2 (*)     Leukocyte Esterase Urine 75 (*)     Squamous Epi. Cells Few (*)     All other components within normal limits   LIPASE - Normal   POCT PREGNANCY URINE - Normal   CBC WITH DIFFERENTIAL WITH PLATELET    Narrative:     The following orders were created for panel order CBC With Differential With Platelet.  Procedure                               Abnormality         Status                     ---------                               -----------         ------                     CBC W/ DIFFERENTIAL[[id number]]                              Final result                 Please view results for these tests on the individual orders.   RAINBOW DRAW LAVENDER   RAINBOW DRAW LIGHT GREEN   CBC W/ DIFFERENTIAL     EKG    Rate, intervals and axes as noted on EKG Report.  Rate: 60  Rhythm: Sinus Rhythm  Reading: Normal sinus rhythm                 Patient was evaluated the emergency department had a CBC chemistry lipase and  urinalysis.  Laboratory data showed a bicarb of 17 and a creatinine 1.07.  She was given 2 L of fluid along with some IV Zofran and some Pepcid.  I do feel the Pepto-Bismol was what was causing her dark stools that she was heme-negative and her hemoglobin is fine.  Urgent care did give her an antibiotic as they were concerned about H. pylori however I told her I would like her to see GI before that diagnosis is official and make sure there is no significant ulcer if there is just bad gastritis.  Told her just to drink broth and very easily digestible soups until she can see GI.  Nothing spicy and definitely no alcohol.  She agrees with plan.  I do not feel any imaging is needed as she has no pain.         MDM      Differential diagnosis reflecting the complexity of care include:    Acute Gastritis: Predominant diagnosis given the patient's severe nausea without abdominal pain, her recent use of Pepto-Bismol contributing to black stools, and the absence of hematemesis or melena.  Dehydration: Suspected due to reported severe nausea and potential decreased oral intake, supported by abnormal laboratory findings (elevated creatinine and altered bicarb levels).  Upper Gastrointestinal Bleed: Initially considered due to the report of black stools, but less likely given the negative heme stool test and stable hemoglobin levels.  H. pylori Infection: Considered due to prescription by urgent care, but recommendation made for confirmation by GI specialist before continuing treatment.    Comorbidities that add complexity to management include:Chronic Kidney Disease (CKD) Stage 3: Relevant due to the need for careful fluid management and consideration of renal function in medication dosing and diagnostic procedures.  History of Epilepsy: Consideration given to stress or illness as potential triggers for seizures.    My independent interpretation of studies of:CBC, CMP, Lipase, and Urinalysis results, which supported the  diagnosis of dehydration and acute gastritis without evidence of a significant upper gastrointestinal bleed or acute pancreatitis.  The decision not to proceed with immediate imaging based on the clinical presentation and current findings, opting instead for conservative management and specialist follow-up.    Diagnostic tests and medications considered but not ordered were:Imaging studies such as an abdominal ultrasound or CT scan were considered unnecessary based on the absence of abdominal pain, normal physical exam, and specific symptoms pointing towards gastritis.    .  Shared decision making was done by:Engaging the patient in discussions about her care plan, including the rationale behind hydration therapy, the use of antiemetics and acid reducers, dietary recommendations, and the importance of specialist follow-up for definitive diagnosis and treatment.  Disposition and Plan:  The patient was discharged with instructions for hydration, dietary modifications, and medications to manage symptoms. Urged to follow up with a GI specialist for further evaluation to confirm gastritis and assess for H. pylori infection, ensuring comprehensive care. Also highlighted was the importance of avoiding substances that could aggravate her condition, such as spicy foods and alcohol, until a full evaluation could be completed by a GI specialist.    Patient was screened and evaluated during this visit.  As the treating physician attending to the patient, I determined within reasonable clinical confidence and prior to discharge, that an emergency medical condition was not or was no longer present.  There was no indication for further evaluation, treatment, or admission on an emergency basis.  Comprehensive verbal and written discharge and follow-up instructions were provided to help prevent relapse or worsening.  Patient was instructed to follow-up with primary care provider for further evaluation treatment, return immediately  to ER for worsening, concerning, new, or changing/persisting symptoms.  I discussed the case with the patient and they had no questions, complaints, or concerns.  Patient was comfortable going home.      Dictation Disclaimer Note:   To increase efficiency this document may have been prepared using voice recognition technology. Every effort has been made to correct any errors made during preparation of this note. However, if a word or phrase is confusing, or does not make sense, this is likely due to a recognition error within the program which was not discovered during editing. Please do not hesitate to contact to address any significant errors.    Note to Patient:   The 21st Century Cures Act makes medical notes like these available to patients in the interest of transparency. Please be advised this is a medical document. Medical documents are intended to carry relevant information, facts as evident, and the clinical opinion of the practitioner. The medical note is intended as peer to peer communication and may appear blunt or direct. It is written in medical language and may contain abbreviations or verbiage that are unfamiliar.                                      Medical Decision Making      Disposition and Plan     Clinical Impression:  1. Acute gastritis without hemorrhage, unspecified gastritis type    2. Dehydration         Disposition:  Discharge  4/8/2024 10:50 pm    Follow-up:  Mikal Pino MD  [address]   [address]  [facility] [address]  [phone]    Follow up            Medications Prescribed:  Current Discharge Medication List        START taking these medications    Details   pantoprazole 40 MG Oral Tab EC Take 1 tablet (40 mg total) by mouth daily.  Qty: 30 tablet, Refills: 0

## 2024-04-18 ENCOUNTER — APPOINTMENT (OUTPATIENT)
Dept: GENERAL RADIOLOGY | Age: 49
End: 2024-04-18
Attending: PHYSICIAN ASSISTANT
Payer: MEDICARE

## 2024-04-18 ENCOUNTER — HOSPITAL ENCOUNTER (OUTPATIENT)
Age: 49
Discharge: HOME | End: 2024-04-18
Payer: MEDICARE

## 2024-04-18 VITALS
RESPIRATION RATE: 18 BRPM | BODY MASS INDEX: 29 KG/M2 | HEART RATE: 78 BPM | WEIGHT: 188 LBS | TEMPERATURE: 98 F | OXYGEN SATURATION: 97 % | SYSTOLIC BLOOD PRESSURE: 92 MMHG | DIASTOLIC BLOOD PRESSURE: 59 MMHG
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
## 2024-04-18 DIAGNOSIS — M47.816 OSTEOARTHRITIS OF LUMBAR SPINE, UNSPECIFIED SPINAL OSTEOARTHRITIS COMPLICATION STATUS: ICD-10-CM

## 2024-04-18 PROCEDURE — 72072 X-RAY EXAM THORAC SPINE 3VWS: CPT | Performed by: PHYSICIAN ASSISTANT

## 2024-04-18 PROCEDURE — 96372 THER/PROPH/DIAG INJ SC/IM: CPT | Performed by: PHYSICIAN ASSISTANT

## 2024-04-18 PROCEDURE — 99213 OFFICE O/P EST LOW 20 MIN: CPT | Performed by: PHYSICIAN ASSISTANT

## 2024-04-18 PROCEDURE — 72110 X-RAY EXAM L-2 SPINE 4/>VWS: CPT | Performed by: PHYSICIAN ASSISTANT

## 2024-04-18 RX ORDER — KETOROLAC TROMETHAMINE 30 MG/ML
30 INJECTION, SOLUTION INTRAMUSCULAR; INTRAVENOUS ONCE
Status: COMPLETED | OUTPATIENT
Start: 2024-04-18 | End: 2024-04-18

## 2024-04-18 NOTE — ED PROVIDER NOTES
Patient Seen in: [facility]      History     Chief Complaint   Patient presents with    Back Pain     Stated Complaint: back pain    Subjective:   HPI  Azeb Millan is a 48 year old female here for evluation of low back pain that is localized in nature due to mechanical fall 5 days prior.  No reported numbness, tingling, parasthesias, skin, color, temperature, sensory changes, pancho/ bladder dysfunction, loss of bowel/ bladder control, saddle anesthesia.  No medications taken prior to arrival.   Pain 5/10 worsened with flexion, extension, weight bearing.       Objective:   Past Medical History:    Anxiety state    Back problem    Chronic pain    CKD (chronic kidney disease) stage 3, GFR 30-59 ml/min ([facility])    Creatinine 1.23 and GFR 53 ED visit 8/28/2020    Depression    Epilepsy ([facility])    Migraines    botox , and nerve blocks     Ovarian cyst    left 3.2cm  seen on CT in ER    Pneumonia due to COVID-19 virus    8/20    Pneumonia due to organism    covid 11/2020 , had to hospitalized 4 days     Pseudoseizure    Seizure disorder ([facility])    last seizure 2 weeks ago              Past Surgical History:   Procedure Laterality Date    Appendectomy      Appendectomy      Brain surgery      neurostim implanted./ removed     Egd N/A 09/23/2021    Procedure: ESOPHAGOGASTRODUODENOSCOPY w/ bxs, COLONOSCOPY w/ bxs;  Surgeon: Phli Murphy MD;  Location: [facility]    Neurostimulator electrode      2 yrs ago    Other      frontal lobectomy    Other      facial surgery    Other      shoulder replacement    Other      cervical fusion    Other      frontal and temporal lobectomies for seizures    Other      bilat shoulder surgery    Other      Neurostimulator history.  This was removed but wires are still present.  Unable to get brain MRI    Other surgical history      cervical fusion    Other surgical history      shoulder replacement    Other surgical history      complete jaw reconstruction      Spine surgery procedure unlisted      cervical fusion     Tonsillectomy      Total shoulder arthroplasty                  Social History     Socioeconomic History    Marital status: Life Partner   Tobacco Use    Smoking status: Never     Passive exposure: Never    Smokeless tobacco: Never   Vaping Use    Vaping status: Never Used   Substance and Sexual Activity    Alcohol use: Yes     Alcohol/week: 1.0 standard drink of alcohol     Types: 1 Standard drinks or equivalent per week    Drug use: Yes     Types: Cannabis     Comment: nightly    Sexual activity: Yes     Partners: Female   Other Topics Concern    Caffeine Concern No    Exercise Yes    Seat Belt Yes   Social History Narrative    ** Merged History Encounter **          Social Determinants of Health     Food Insecurity: Unknown (12/28/2023)    Food Insecurity     Food Insecurity: Patient unable to answer   Transportation Needs: Unknown (12/28/2023)    Transportation Needs     Lack of Transportation: Patient unable to answer   Housing Stability: Unknown (12/28/2023)    Housing Stability     Housing Instability: Patient unable to answer              Review of Systems   All other systems reviewed and are negative.      Positive for stated complaint: back pain  Other systems are as noted in HPI.  Constitutional and vital signs reviewed.      All other systems reviewed and negative except as noted above.    Physical Exam     ED Triage Vitals [04/18/24 1640]   BP 93/70   Pulse 77   Resp 18   Temp 97.7 °F (36.5 °C)   Temp src Temporal   SpO2 98 %   O2 Device None (Room air)       Current:BP 92/59   Pulse 78   Temp 97.7 °F (36.5 °C) (Temporal)   Resp 18   Wt 85.3 kg   LMP 04/15/2024 (Exact Date)   SpO2 97%   BMI 29.44 kg/m²         Physical Exam  Vitals and nursing note reviewed.   Constitutional:       General: She is not in acute distress.     Appearance: Normal appearance. She is normal weight. She is not ill-appearing, toxic-appearing or diaphoretic.    HENT:      Head: Normocephalic and atraumatic.      Right Ear: External ear normal.      Left Ear: External ear normal.      Nose: Nose normal.   Eyes:      Extraocular Movements: Extraocular movements intact.      Conjunctiva/sclera: Conjunctivae normal.      Pupils: Pupils are equal, round, and reactive to light.   Pulmonary:      Effort: Pulmonary effort is normal. No respiratory distress.   Musculoskeletal:         General: No swelling, tenderness, deformity or signs of injury. Normal range of motion.      Cervical back: Normal range of motion and neck supple.      Comments: Bilateral thoracolumbar paraspinal tenderness to palpation.  No step offs, deformities, crepitus, midline tenderness.  Full back flexion/ extension      Skin:     General: Skin is warm and dry.      Capillary Refill: Capillary refill takes less than 2 seconds.      Coloration: Skin is not jaundiced or pale.      Findings: No bruising, erythema, lesion or rash.   Neurological:      General: No focal deficit present.      Mental Status: She is alert and oriented to person, place, and time. Mental status is at baseline.      Gait: Gait normal.   Psychiatric:         Mood and Affect: Mood normal.         Behavior: Behavior normal.               ED Course     XR THORACIC SPINE (3 VIEWS) (CPT=72072)   Final Result   PROCEDURE:  XR THORACIC SPINE (3 VIEWS) (CPT=72072)       TECHNIQUE:  AP, lateral, and swimmer's views of the thoracic spine were    obtained.       COMPARISON:  [facility] , CT, CT CHEST ABDOMEN PELVIS (ALL    W+WO)(CPT=74178/49661), 5/17/2023, 3:42 PM.       INDICATIONS:  back pain       PATIENT STATED HISTORY: (As transcribed by Technologist)  Pt has low and    middle back pain. She fell 5 days ago.             FINDINGS:       Stimulator device noted.  Cervical fusion hardware.  Thoracic vertebral    alignment is within normal limits.  There is deformity of the superior    endplate of the T12 vertebral body.  Hypertrophic spurring of  the thoracic    spine.                         =====   CONCLUSION:     Deformity of the superior endplate of T12 vertebral body which may    represent age-indeterminate fracture.  Correlation to region of pain is    recommended.  This is new since 5/17/2023.           LOCATION:  [id number]               Dictated by (CST): Adore Mcdonald MD on 4/18/2024 at 5:36 PM        Finalized by (CST): Adore Mcdonald MD on 4/18/2024 at 5:47 PM         XR LUMBAR SPINE (MIN 4 VIEWS) (CPT=72110)   Final Result   PROCEDURE:  XR LUMBAR SPINE (MIN 4 VIEWS) (CPT=72110)       TECHNIQUE:  AP, lateral, oblique, and coned down L5-S1 views were    obtained.       COMPARISON:  [facility] , XR, XR LUMBAR SPINE (MIN 4 VIEWS) (CPT=72110),    12/27/2018, 2:21 PM.       INDICATIONS:  back pain       PATIENT STATED HISTORY: (As transcribed by Technologist)  Pt has low and    middle back pain. She fell 5 days ago.              FINDINGS:       Lumbar vertebral alignment demonstrates minimal retrolisthesis of L4 on    L5.  Multilevel degenerative changes greatest at L4-L5 level.  There is    deformity of the superior endplate of the T12 vertebral body                         =====   CONCLUSION:     1. There is interval deformity of the superior endplate of the T12    vertebral body which may represent age-indeterminate fracture.     Correlation to region of pain is recommended and if further evaluation is    needed, consider MRI.   2. Multilevel degenerative changes greatest at L4-L5 level.             LOCATION:  [id number]           Dictated by (CST): Adore Mcdonald MD on 4/18/2024 at 5:34 PM        Finalized by (CST): Adore Mcdonald MD on 4/18/2024 at 5:36 PM           Medications   ketorolac (Toradol) 60 MG/2ML IM injection 30 mg (30 mg Intramuscular Given 4/18/24 1724)                      MDM                                        Medical Decision Making  48 year old female presents with thoracolumbar pain due to mechanical fall 5 days prior.    Considerations to includecauda equina versus piriformis syndrome versus pyelonephritis versus nephrolithiasis.  Currently patient denies weakness, saddle anesthesia, bowel/bladder incontinence, hematuria, dysuria, fevers, chills.  Plan   - toradol 30mg IM  - x ray: Thoracic spine.  lumbar spine.   - Alternate between ice and heat to affected region   - Will defer pain medications to pain specialist who she will see on 4/19/2024  -OTC: Tylenol 1g po q 6 hours/ prn.   - refer to orthopaedics/ PCP as needed  - explained to patient that if symptoms do not improve that an MRI may be warranted however that will be determined at the discretion of follow up care  -Will provide information for spine specialist for outpatient follow-up  - RICE:  Alternate between ice and heat 4-5 times daily or as needed for no longer than 5-10 minutes at a time.   When applying ice apply a layer of cloth between skin and cold source in order to prevent tissue irritation  - Return to ED if symptoms worsen            Amount and/or Complexity of Data Reviewed  Radiology: ordered and independent interpretation performed. Decision-making details documented in ED Course.     Details: Suspicious endplate fracture of T12 based on my independent interpretation        Disposition and Plan     Clinical Impression:  1. Fall, initial encounter    2. Osteoarthritis of lumbar spine, unspecified spinal osteoarthritis complication status         Disposition:  Discharge  4/18/2024  6:15 pm    Follow-up:  Craig Dunham MD  [address] [place] [id number]  [phone]                Medications Prescribed:  Discharge Medication List as of 4/18/2024  6:17 PM

## 2024-04-25 ENCOUNTER — HOSPITAL ENCOUNTER (OUTPATIENT)
Dept: NUCLEAR MEDICINE | Facility: HOSPITAL | Age: 49
Discharge: HOME | End: 2024-04-25
Attending: FAMILY MEDICINE
Payer: MEDICARE

## 2024-04-25 DIAGNOSIS — M48.54XA COLLAPSE OF THORACIC VERTEBRA (HCC): ICD-10-CM

## 2024-04-25 PROCEDURE — 78315 BONE IMAGING 3 PHASE: CPT | Performed by: FAMILY MEDICINE

## 2024-05-03 ENCOUNTER — HOSPITAL ENCOUNTER (EMERGENCY)
Facility: HOSPITAL | Age: 49
Discharge: HOME | End: 2024-05-03
Attending: EMERGENCY MEDICINE
Payer: MEDICARE

## 2024-05-03 ENCOUNTER — APPOINTMENT (OUTPATIENT)
Dept: CT IMAGING | Facility: HOSPITAL | Age: 49
End: 2024-05-03
Attending: EMERGENCY MEDICINE
Payer: MEDICARE

## 2024-05-03 VITALS
SYSTOLIC BLOOD PRESSURE: 113 MMHG | TEMPERATURE: 97 F | HEIGHT: 67 IN | OXYGEN SATURATION: 100 % | WEIGHT: 190 LBS | HEART RATE: 83 BPM | DIASTOLIC BLOOD PRESSURE: 77 MMHG | RESPIRATION RATE: 24 BRPM | BODY MASS INDEX: 29.82 KG/M2

## 2024-05-03 DIAGNOSIS — G43.809 OTHER MIGRAINE WITHOUT STATUS MIGRAINOSUS, NOT INTRACTABLE: ICD-10-CM

## 2024-05-03 DIAGNOSIS — G40.909 SEIZURE DISORDER (HCC): Primary | ICD-10-CM

## 2024-05-03 LAB
ALBUMIN SERPL-MCNC: 3.6 G/DL (ref 3.4–5)
ALBUMIN/GLOB SERPL: 1 {RATIO} (ref 1–2)
ALP LIVER SERPL-CCNC: 90 U/L
ALT SERPL-CCNC: 14 U/L
ANION GAP SERPL CALC-SCNC: 9 MMOL/L (ref 0–18)
AST SERPL-CCNC: 13 U/L (ref 15–37)
ATRIAL RATE: 95 BPM
BASOPHILS # BLD AUTO: 0.02 X10(3) UL (ref 0–0.2)
BASOPHILS NFR BLD AUTO: 0.4 %
BILIRUB SERPL-MCNC: 0.2 MG/DL (ref 0.1–2)
BUN BLD-MCNC: 19 MG/DL (ref 9–23)
CALCIUM BLD-MCNC: 8.9 MG/DL (ref 8.5–10.1)
CHLORIDE SERPL-SCNC: 114 MMOL/L (ref 98–112)
CO2 SERPL-SCNC: 16 MMOL/L (ref 21–32)
CREAT BLD-MCNC: 1.24 MG/DL
EGFRCR SERPLBLD CKD-EPI 2021: 54 ML/MIN/1.73M2 (ref 60–?)
EOSINOPHIL # BLD AUTO: 0.08 X10(3) UL (ref 0–0.7)
EOSINOPHIL NFR BLD AUTO: 1.4 %
ERYTHROCYTE [DISTWIDTH] IN BLOOD BY AUTOMATED COUNT: 13.3 %
ETHANOL SERPL-MCNC: <3 MG/DL (ref ?–3)
GLOBULIN PLAS-MCNC: 3.6 G/DL (ref 2.8–4.4)
GLUCOSE BLD-MCNC: 123 MG/DL (ref 70–99)
HCT VFR BLD AUTO: 42 %
HGB BLD-MCNC: 14.4 G/DL
IMM GRANULOCYTES # BLD AUTO: 0.02 X10(3) UL (ref 0–1)
IMM GRANULOCYTES NFR BLD: 0.4 %
LYMPHOCYTES # BLD AUTO: 1.4 X10(3) UL (ref 1–4)
LYMPHOCYTES NFR BLD AUTO: 25.2 %
MCH RBC QN AUTO: 30.1 PG (ref 26–34)
MCHC RBC AUTO-ENTMCNC: 34.3 G/DL (ref 31–37)
MCV RBC AUTO: 87.9 FL
MONOCYTES # BLD AUTO: 0.31 X10(3) UL (ref 0.1–1)
MONOCYTES NFR BLD AUTO: 5.6 %
NEUTROPHILS # BLD AUTO: 3.73 X10 (3) UL (ref 1.5–7.7)
NEUTROPHILS # BLD AUTO: 3.73 X10(3) UL (ref 1.5–7.7)
NEUTROPHILS NFR BLD AUTO: 67 %
OSMOLALITY SERPL CALC.SUM OF ELEC: 292 MOSM/KG (ref 275–295)
P AXIS: 32 DEGREES
P-R INTERVAL: 152 MS
PLATELET # BLD AUTO: 222 10(3)UL (ref 150–450)
POTASSIUM SERPL-SCNC: 3.4 MMOL/L (ref 3.5–5.1)
PROT SERPL-MCNC: 7.2 G/DL (ref 6.4–8.2)
Q-T INTERVAL: 336 MS
QRS DURATION: 82 MS
QTC CALCULATION (BEZET): 422 MS
R AXIS: -13 DEGREES
RBC # BLD AUTO: 4.78 X10(6)UL
SODIUM SERPL-SCNC: 139 MMOL/L (ref 136–145)
T AXIS: 29 DEGREES
VENTRICULAR RATE: 95 BPM
WBC # BLD AUTO: 5.6 X10(3) UL (ref 4–11)

## 2024-05-03 PROCEDURE — 85025 COMPLETE CBC W/AUTO DIFF WBC: CPT | Performed by: EMERGENCY MEDICINE

## 2024-05-03 PROCEDURE — 96375 TX/PRO/DX INJ NEW DRUG ADDON: CPT

## 2024-05-03 PROCEDURE — C9254 INJECTION, LACOSAMIDE: HCPCS | Performed by: EMERGENCY MEDICINE

## 2024-05-03 PROCEDURE — 93005 ELECTROCARDIOGRAM TRACING: CPT

## 2024-05-03 PROCEDURE — 99285 EMERGENCY DEPT VISIT HI MDM: CPT

## 2024-05-03 PROCEDURE — 93010 ELECTROCARDIOGRAM REPORT: CPT

## 2024-05-03 PROCEDURE — 80053 COMPREHEN METABOLIC PANEL: CPT | Performed by: EMERGENCY MEDICINE

## 2024-05-03 PROCEDURE — 70450 CT HEAD/BRAIN W/O DYE: CPT | Performed by: EMERGENCY MEDICINE

## 2024-05-03 PROCEDURE — 82077 ASSAY SPEC XCP UR&BREATH IA: CPT | Performed by: EMERGENCY MEDICINE

## 2024-05-03 PROCEDURE — 96374 THER/PROPH/DIAG INJ IV PUSH: CPT

## 2024-05-03 RX ORDER — KETOROLAC TROMETHAMINE 15 MG/ML
15 INJECTION, SOLUTION INTRAMUSCULAR; INTRAVENOUS ONCE
Status: COMPLETED | OUTPATIENT
Start: 2024-05-03 | End: 2024-05-03

## 2024-05-03 RX ORDER — LACOSAMIDE 10 MG/ML
100 INJECTION, SOLUTION INTRAVENOUS ONCE
Status: COMPLETED | OUTPATIENT
Start: 2024-05-03 | End: 2024-05-03

## 2024-05-03 RX ORDER — METOCLOPRAMIDE HYDROCHLORIDE 5 MG/ML
10 INJECTION INTRAMUSCULAR; INTRAVENOUS ONCE
Status: COMPLETED | OUTPATIENT
Start: 2024-05-03 | End: 2024-05-03

## 2024-05-03 RX ORDER — DIPHENHYDRAMINE HYDROCHLORIDE 50 MG/ML
25 INJECTION INTRAMUSCULAR; INTRAVENOUS ONCE
Status: COMPLETED | OUTPATIENT
Start: 2024-05-03 | End: 2024-05-03

## 2024-05-03 RX ORDER — LACOSAMIDE 50 MG/1
50 TABLET ORAL DAILY
Qty: 30 TABLET | Refills: 0 | Status: SHIPPED | OUTPATIENT
Start: 2024-05-03

## 2024-05-03 NOTE — ED PROVIDER NOTES
Patient Seen in: [facility] Emergency Department      History     Chief Complaint   Patient presents with    Seizure Disorder     Stated Complaint: Patient to ED by [facility] 9 for witnessed seizure at work. Per paramedics, patient's*    Subjective:   HPI    48-year-old female with a past medical history as below including epilepsy, pseudoseizures, anxiety/depression and migraines brought by EMS after she had multiple seizures at work today.  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  Did not note any postictal period afterwards.  Patient states she had a migraine headache this morning for which she took Toradol and some triptan with improvement.  Patient states headache worsened after seizures which is typical for her.  She denies any preceding fever, chills, cough or cold symptoms.  Denies chest pain or shortness of breath.  Denies abdominal pain, vomiting or diarrhea.  Denies visual changes.      Objective:   Past Medical History:    Anxiety state    Back problem    Chronic pain    CKD (chronic kidney disease) stage 3, GFR 30-59 ml/min ([facility])    Creatinine 1.23 and GFR 53 ED visit 8/28/2020    Depression    Epilepsy (HCC)    Migraines    botox , and nerve blocks     Ovarian cyst    left 3.2cm  seen on CT in ER    Pneumonia due to COVID-19 virus    8/20    Pneumonia due to organism    covid 11/2020 , had to hospitalized 4 days     Pseudoseizure    Seizure disorder (HCC)    last seizure 2 weeks ago              Past Surgical History:   Procedure Laterality Date    Appendectomy      Appendectomy      Brain surgery      neurostim implanted./ removed     Egd N/A 09/23/2021    Procedure: ESOPHAGOGASTRODUODENOSCOPY w/ bxs, COLONOSCOPY w/ bxs;  Surgeon: Phil Murphy MD;  [facility] [place]    Neurostimulator electrode      2 yrs ago    Other      frontal lobectomy     Other      facial surgery    Other      shoulder replacement    Other      cervical fusion    Other      frontal and temporal lobectomies for seizures    Other      bilat shoulder surgery    Other      Neurostimulator history.  This was removed but wires are still present.  Unable to get brain MRI    Other surgical history      cervical fusion    Other surgical history      shoulder replacement    Other surgical history      complete jaw reconstruction     Spine surgery procedure unlisted      cervical fusion     Tonsillectomy      Total shoulder arthroplasty                  Social History     Socioeconomic History    Marital status: Life Partner   Tobacco Use    Smoking status: Never     Passive exposure: Never    Smokeless tobacco: Never   Vaping Use    Vaping status: Never Used   Substance and Sexual Activity    Alcohol use: Yes     Alcohol/week: 1.0 standard drink of alcohol     Types: 1 Standard drinks or equivalent per week    Drug use: Yes     Types: Cannabis     Comment: nightly    Sexual activity: Yes     Partners: Female   Other Topics Concern    Caffeine Concern No    Exercise Yes    Seat Belt Yes   Social History Narrative    ** Merged History Encounter **          Social Determinants of Health     Food Insecurity: Unknown (12/28/2023)    Food Insecurity     Food Insecurity: Patient unable to answer   Transportation Needs: Unknown (12/28/2023)    Transportation Needs     Lack of Transportation: Patient unable to answer   Housing Stability: Unknown (12/28/2023)    Housing Stability     Housing Instability: Patient unable to answer              Review of Systems    Positive for stated complaint: Patient to ED by [facility] 9 for witnessed seizure at work. Per paramedics, patient's*  Other systems are as noted in HPI.  Constitutional and vital signs reviewed.      All other systems reviewed and negative except as noted above.    Physical Exam     ED Triage Vitals [05/03/24 0831]   /79   Pulse 95   Resp 18    Temp 97.3 °F (36.3 °C)   Temp src Temporal   SpO2 99 %   O2 Device None (Room air)       Current:/77   Pulse 83   Temp 97.3 °F (36.3 °C) (Temporal)   Resp 17   Ht 170.2 cm (5' 7\")   Wt 86.2 kg   LMP 04/15/2024 (Exact Date)   SpO2 100%   BMI 29.76 kg/m²         Physical Exam  Vitals and nursing note reviewed.   Constitutional:       Appearance: She is well-developed.   HENT:      Head: Normocephalic and atraumatic.      Mouth/Throat:      Mouth: Mucous membranes are moist.   Eyes:      General: No scleral icterus.     Extraocular Movements: Extraocular movements intact.      Conjunctiva/sclera: Conjunctivae normal.      Pupils: Pupils are equal, round, and reactive to light.   Cardiovascular:      Rate and Rhythm: Normal rate and regular rhythm.   Pulmonary:      Effort: Pulmonary effort is normal.      Breath sounds: Normal breath sounds.   Abdominal:      Palpations: Abdomen is soft.      Tenderness: There is abdominal tenderness.   Skin:     General: Skin is warm and dry.   Neurological:      General: No focal deficit present.      Mental Status: She is alert and oriented to person, place, and time.      Cranial Nerves: No cranial nerve deficit.      Motor: No weakness.   Psychiatric:         Mood and Affect: Mood normal.         Behavior: Behavior normal.               ED Course     Labs Reviewed   COMP METABOLIC PANEL (14) - Abnormal; Notable for the following components:       Result Value    Glucose 123 (*)     Potassium 3.4 (*)     Chloride 114 (*)     CO2 16.0 (*)     Creatinine 1.24 (*)     eGFR-Cr 54 (*)     AST 13 (*)     All other components within normal limits   ETHYL ALCOHOL - Normal   CBC WITH DIFFERENTIAL WITH PLATELET    Narrative:     The following orders were created for panel order CBC With Differential With Platelet.  Procedure                               Abnormality         Status                     ---------                               -----------         ------                      CBC W/ DIFFERENTIAL[[id number]]                              Final result                 Please view results for these tests on the individual orders.   DRUG SCREEN 8 W/OUT CONFIRMATION, URINE   URINALYSIS, ROUTINE   RAINBOW DRAW LAVENDER   RAINBOW DRAW LIGHT GREEN   RAINBOW DRAW GOLD   RAINBOW DRAW BLUE   CBC W/ DIFFERENTIAL     EKG    Rate, intervals and axes as noted on EKG Report.  Rate: 95  Rhythm: Sinus Rhythm  Reading: Normal sinus rhythm, low voltage, no ST/T wave changes                 CT BRAIN OR HEAD (19810)    Result Date: 5/3/2024  CONCLUSION:  No acute intracranial abnormality.    LOCATION:  [facility]   Dictated by (CST): Serge Pratt MD on 5/03/2024 at 9:35 AM     Finalized by (CST): Serge Pratt MD on 5/03/2024 at 9:37 AM               MDM   48-year-old female with a past medical history as below including epilepsy, pseudoseizures, anxiety/depression and migraines brought by EMS after she had multiple seizures at work today.     Differential includes but is not limited to breakthrough seizures, pseudoseizures, migraine headache, ICH, electrolyte abnormality    Independent interpretation of CT brain shows no evidence of bleed or stroke.  Radiology report reviewed as above noting no acute intracranial abnormality.    Labs show mild CKD which patient has had previously.  Potassium minimally low at 3.4.  WBC and hemoglobin are normal.    Patient's migraine headache was treated with Toradol, Reglan and Benadryl with resolution.  Patient states he feels much better.    Discussed with patient's neurologist Dr. Lynch.  Unclear if patient's episodes are seizures versus nonepileptic episodes.  Recommends giving a dose of Vimpat 100 mg IV and increasing outpatient dose by 50 mg daily.  Advise close follow-up with her neurologist.  Return precautions discussed.      Medical Decision Making  Amount and/or Complexity of Data Reviewed  Independent Historian: EMS     Details: See HPI  Labs: ordered.  Decision-making details documented in ED Course.  Radiology: ordered and independent interpretation performed. Decision-making details documented in ED Course.  ECG/medicine tests: ordered and independent interpretation performed. Decision-making details documented in ED Course.  Discussion of management or test interpretation with external provider(s): Neurology    Risk  Prescription drug management.  Decision regarding hospitalization.        Disposition and Plan     Clinical Impression:  1. Seizure disorder (HCC)    2. Other migraine without status migrainosus, not intractable         Disposition:  Discharge  5/3/2024 12:04 pm    Follow-up:  Evangelista Lynch  [address]  [place] [id number] [phone]    Schedule an appointment as soon as possible for a visit      Nara Harkins MD  [address]  [address]  [place] [address] [phone]    Schedule an appointment as soon as possible for a visit            Medications Prescribed:  Current Discharge Medication List        START taking these medications    Details   !! lacosamide (VIMPAT) 50 MG Oral Tab Take 1 tablet (50 mg total) by mouth daily.  Qty: 30 tablet, Refills: 0    Associated Diagnoses: Seizure disorder (HCC)       !! - Potential duplicate medications found. Please discuss with provider.

## 2024-05-03 NOTE — ED INITIAL ASSESSMENT (HPI)
Patient with history of seizures and migraines, to ED for multiple witnessed seizures today. No trauma. Patient currently A and O x4.

## 2024-06-04 ENCOUNTER — HOSPITAL ENCOUNTER (EMERGENCY)
Facility: HOSPITAL | Age: 49
Discharge: HOME | End: 2024-06-04
Attending: EMERGENCY MEDICINE
Payer: MEDICARE

## 2024-06-04 ENCOUNTER — APPOINTMENT (OUTPATIENT)
Dept: CT IMAGING | Facility: HOSPITAL | Age: 49
End: 2024-06-04
Attending: EMERGENCY MEDICINE
Payer: MEDICARE

## 2024-06-04 VITALS
HEART RATE: 59 BPM | TEMPERATURE: 99 F | SYSTOLIC BLOOD PRESSURE: 113 MMHG | BODY MASS INDEX: 28.72 KG/M2 | HEIGHT: 67 IN | WEIGHT: 183 LBS | DIASTOLIC BLOOD PRESSURE: 71 MMHG | RESPIRATION RATE: 20 BRPM | OXYGEN SATURATION: 99 %

## 2024-06-04 DIAGNOSIS — R10.9 ABDOMINAL PAIN OF UNKNOWN ETIOLOGY: Primary | ICD-10-CM

## 2024-06-04 LAB
ALBUMIN SERPL-MCNC: 4.1 G/DL (ref 3.4–5)
ALBUMIN/GLOB SERPL: 1.1 {RATIO} (ref 1–2)
ALP LIVER SERPL-CCNC: 61 U/L
ALT SERPL-CCNC: 16 U/L
ANION GAP SERPL CALC-SCNC: 8 MMOL/L (ref 0–18)
AST SERPL-CCNC: 8 U/L (ref 15–37)
B-HCG UR QL: NEGATIVE
BASOPHILS # BLD AUTO: 0.02 X10(3) UL (ref 0–0.2)
BASOPHILS NFR BLD AUTO: 0.4 %
BILIRUB SERPL-MCNC: 0.3 MG/DL (ref 0.1–2)
BILIRUB UR QL STRIP.AUTO: NEGATIVE
BUN BLD-MCNC: 15 MG/DL (ref 9–23)
CALCIUM BLD-MCNC: 8.5 MG/DL (ref 8.5–10.1)
CHLORIDE SERPL-SCNC: 113 MMOL/L (ref 98–112)
CLARITY UR REFRACT.AUTO: CLEAR
CO2 SERPL-SCNC: 18 MMOL/L (ref 21–32)
CREAT BLD-MCNC: 1.17 MG/DL
EGFRCR SERPLBLD CKD-EPI 2021: 58 ML/MIN/1.73M2 (ref 60–?)
EOSINOPHIL # BLD AUTO: 0.08 X10(3) UL (ref 0–0.7)
EOSINOPHIL NFR BLD AUTO: 1.6 %
ERYTHROCYTE [DISTWIDTH] IN BLOOD BY AUTOMATED COUNT: 13.7 %
GLOBULIN PLAS-MCNC: 3.6 G/DL (ref 2.8–4.4)
GLUCOSE BLD-MCNC: 104 MG/DL (ref 70–99)
GLUCOSE UR STRIP.AUTO-MCNC: NORMAL MG/DL
HCT VFR BLD AUTO: 40.1 %
HGB BLD-MCNC: 13.6 G/DL
IMM GRANULOCYTES # BLD AUTO: 0.01 X10(3) UL (ref 0–1)
IMM GRANULOCYTES NFR BLD: 0.2 %
KETONES UR STRIP.AUTO-MCNC: NEGATIVE MG/DL
LEUKOCYTE ESTERASE UR QL STRIP.AUTO: 25
LIPASE SERPL-CCNC: 65 U/L (ref 13–75)
LYMPHOCYTES # BLD AUTO: 1.55 X10(3) UL (ref 1–4)
LYMPHOCYTES NFR BLD AUTO: 30.1 %
MCH RBC QN AUTO: 30 PG (ref 26–34)
MCHC RBC AUTO-ENTMCNC: 33.9 G/DL (ref 31–37)
MCV RBC AUTO: 88.5 FL
MONOCYTES # BLD AUTO: 0.38 X10(3) UL (ref 0.1–1)
MONOCYTES NFR BLD AUTO: 7.4 %
NEUTROPHILS # BLD AUTO: 3.11 X10 (3) UL (ref 1.5–7.7)
NEUTROPHILS # BLD AUTO: 3.11 X10(3) UL (ref 1.5–7.7)
NEUTROPHILS NFR BLD AUTO: 60.3 %
NITRITE UR QL STRIP.AUTO: NEGATIVE
OSMOLALITY SERPL CALC.SUM OF ELEC: 289 MOSM/KG (ref 275–295)
PH UR STRIP.AUTO: 7.5 [PH] (ref 5–8)
PLATELET # BLD AUTO: 214 10(3)UL (ref 150–450)
POTASSIUM SERPL-SCNC: 3.6 MMOL/L (ref 3.5–5.1)
PROT SERPL-MCNC: 7.7 G/DL (ref 6.4–8.2)
PROT UR STRIP.AUTO-MCNC: NEGATIVE MG/DL
RBC # BLD AUTO: 4.53 X10(6)UL
RBC UR QL AUTO: NEGATIVE
SODIUM SERPL-SCNC: 139 MMOL/L (ref 136–145)
SP GR UR STRIP.AUTO: 1.01 (ref 1–1.03)
UROBILINOGEN UR STRIP.AUTO-MCNC: NORMAL MG/DL
WBC # BLD AUTO: 5.2 X10(3) UL (ref 4–11)

## 2024-06-04 PROCEDURE — 81001 URINALYSIS AUTO W/SCOPE: CPT | Performed by: EMERGENCY MEDICINE

## 2024-06-04 PROCEDURE — S0028 INJECTION, FAMOTIDINE, 20 MG: HCPCS | Performed by: EMERGENCY MEDICINE

## 2024-06-04 PROCEDURE — 96375 TX/PRO/DX INJ NEW DRUG ADDON: CPT

## 2024-06-04 PROCEDURE — 83690 ASSAY OF LIPASE: CPT | Performed by: EMERGENCY MEDICINE

## 2024-06-04 PROCEDURE — 99285 EMERGENCY DEPT VISIT HI MDM: CPT

## 2024-06-04 PROCEDURE — 74177 CT ABD & PELVIS W/CONTRAST: CPT | Performed by: EMERGENCY MEDICINE

## 2024-06-04 PROCEDURE — 96374 THER/PROPH/DIAG INJ IV PUSH: CPT

## 2024-06-04 PROCEDURE — 81025 URINE PREGNANCY TEST: CPT

## 2024-06-04 PROCEDURE — 96361 HYDRATE IV INFUSION ADD-ON: CPT

## 2024-06-04 PROCEDURE — 87086 URINE CULTURE/COLONY COUNT: CPT | Performed by: EMERGENCY MEDICINE

## 2024-06-04 PROCEDURE — 80053 COMPREHEN METABOLIC PANEL: CPT | Performed by: EMERGENCY MEDICINE

## 2024-06-04 PROCEDURE — 82272 OCCULT BLD FECES 1-3 TESTS: CPT

## 2024-06-04 PROCEDURE — 99284 EMERGENCY DEPT VISIT MOD MDM: CPT

## 2024-06-04 PROCEDURE — 85025 COMPLETE CBC W/AUTO DIFF WBC: CPT | Performed by: EMERGENCY MEDICINE

## 2024-06-04 PROCEDURE — 96372 THER/PROPH/DIAG INJ SC/IM: CPT

## 2024-06-04 RX ORDER — DICYCLOMINE HYDROCHLORIDE 10 MG/ML
20 INJECTION INTRAMUSCULAR ONCE
Status: COMPLETED | OUTPATIENT
Start: 2024-06-04 | End: 2024-06-04

## 2024-06-04 RX ORDER — METOCLOPRAMIDE HYDROCHLORIDE 5 MG/ML
10 INJECTION INTRAMUSCULAR; INTRAVENOUS ONCE
Status: COMPLETED | OUTPATIENT
Start: 2024-06-04 | End: 2024-06-04

## 2024-06-04 RX ORDER — FAMOTIDINE 10 MG/ML
20 INJECTION, SOLUTION INTRAVENOUS ONCE
Status: COMPLETED | OUTPATIENT
Start: 2024-06-04 | End: 2024-06-04

## 2024-06-04 RX ORDER — DIPHENHYDRAMINE HYDROCHLORIDE 50 MG/ML
12.5 INJECTION INTRAMUSCULAR; INTRAVENOUS ONCE
Status: COMPLETED | OUTPATIENT
Start: 2024-06-04 | End: 2024-06-04

## 2024-06-04 RX ORDER — DICYCLOMINE HCL 20 MG
20 TABLET ORAL 4 TIMES DAILY PRN
Qty: 30 TABLET | Refills: 0 | Status: SHIPPED | OUTPATIENT
Start: 2024-06-04 | End: 2024-07-04

## 2024-06-04 RX ORDER — FAMOTIDINE 20 MG/1
20 TABLET, FILM COATED ORAL 2 TIMES DAILY PRN
Qty: 30 TABLET | Refills: 0 | Status: SHIPPED | OUTPATIENT
Start: 2024-06-04 | End: 2024-07-04

## 2024-06-04 NOTE — ED PROVIDER NOTES
Patient Seen in: [facility] Emergency Department      History     Chief Complaint   Patient presents with    Abdomen/Flank Pain     Stated Complaint: abb pain    Subjective:   HPI    Patient here for evaluation abdominal pain.  She is been having nausea for some time, has seen GI for this, has had upper and lower endoscopies done in the last several months that she reports were unremarkable.  She has had diffuse crampy abdominal pain since last night.  Nauseated cannot vomit, no diarrhea.  States that she had a black stool last night.    Objective:   No pertinent past medical history.            No pertinent past surgical history.              No pertinent social history.            Review of Systems    Positive for stated complaint: abb pain  Other systems are as noted in HPI.  Constitutional and vital signs reviewed.      All other systems reviewed and negative except as noted above.    Physical Exam     ED Triage Vitals [06/04/24 1116]   /80   Pulse 94   Resp (!) 28   Temp 98.6 °F (37 °C)   Temp src Oral   SpO2 100 %   O2 Device None (Room air)       Current Vitals:   Vital Signs  BP: 113/71  Pulse: 59  Resp: 20  Temp: 98.6 °F (37 °C)  Temp src: Oral  MAP (mmHg): 84    Oxygen Therapy  SpO2: 99 %  O2 Device: None (Room air)            Physical Exam    Physical Exam   Constitutional: Awake, alert, well appearing  Head: Normocephalic and atraumatic.   Eyes: Conjunctivae are normal. Pupils are equal, round, and reactive to light.   Neck: Normal range of motion. Neck supple.   Cardiovascular: Normal rate, regular rhythm  Pulmonary/Chest: Normal effort.  No accessory muscle use.  No clubbing, no cyanosis.  Abdominal: Patient complains of extreme pain even with very light palpation.    Objectively, nontender with no peritoneal signs nondistended active bowel sounds throughout  Neurological: Pt is alert and oriented to person, place, and time. no cranial nerve deficits  Skin: Skin is warm and dry.    ED  Course     Labs Reviewed   COMP METABOLIC PANEL (14) - Abnormal; Notable for the following components:       Result Value    Glucose 104 (*)     Chloride 113 (*)     CO2 18.0 (*)     Creatinine 1.17 (*)     eGFR-Cr 58 (*)     AST 8 (*)     All other components within normal limits   URINALYSIS WITH CULTURE REFLEX - Abnormal; Notable for the following components:    Leukocyte Esterase Urine 25 (*)     Squamous Epi. Cells Few (*)     All other components within normal limits   LIPASE - Normal   POCT PREGNANCY URINE - Normal   CBC WITH DIFFERENTIAL WITH PLATELET    Narrative:     The following orders were created for panel order CBC With Differential With Platelet.  Procedure                               Abnormality         Status                     ---------                               -----------         ------                     CBC W/ DIFFERENTIAL[[id number]]                              Final result                 Please view results for these tests on the individual orders.   URINE CULTURE, ROUTINE   CBC W/ DIFFERENTIAL             Blood work reviewed, low bicarb is actually somewhat chronic.  IV fluids ordered nonetheless.  Patient has negative lipase negative    CT ABDOMEN+PELVIS(CONTRAST ONLY)(CPT=74177)    Result Date: 6/4/2024  CONCLUSION:  1. Bilateral nonobstructing renal calcifications. 2. Colonic wall thickening distally may be due to lack of adequate distension, colitis cannot be entirely excluded, correlate clinically.  No free fluid. 3. Small bilateral ovarian cysts.  Pelvic sonogram can be performed for further evaluation as clinically indicated.   LOCATION:  [facility]   Dictated by (CST): Lindsey Franco MD on 6/04/2024 at 1:38 PM     Finalized by (CST): Lindsey Franco MD on 6/04/2024 at 1:45 PM          Medications   metoclopramide (Reglan) 5 mg/mL injection 10 mg (10 mg Intravenous Given 6/4/24 1221)   diphenhydrAMINE (Benadryl) 50 mg/mL  injection 12.5 mg (12.5 mg Intravenous Given 6/4/24 1217)    dicyclomine (Bentyl) 10 MG/ML injection 20 mg (20 mg Intramuscular Given 6/4/24 1221)   famotidine (Pepcid) 20 mg/2mL injection 20 mg (20 mg Intravenous Given 6/4/24 1218)   sodium chloride 0.9 % IV bolus 1,000 mL (0 mL Intravenous Stopped 6/4/24 1325)   iopamidol 76% (ISOVUE-370) injection for power injector (80 mL Intravenous Given 6/4/24 1323)         Patient felt better afterwards.  I reevaluated her, belly was pretty benign.    Rectal exam done with CARISSA Almanzar at bedside: Brown stool guaiac negative         MDM          Differential diagnoses considered: Colitis, diverticulitis, surgical emergencies as perforated viscus all considered    -CT scan report discussed with patient, clinically do not believe that she has colitis.  -Patient felt better after the above medications.  -Unclear etiology for symptoms, may be food intolerance or abdominal cramping.  -Trial Pepcid at home.    -Consider taking Bentyl regularly after her gastric emptying study this Friday.      I visualized the radiology studies, my independent interpretation: No free air or free fluid noted on CT scan abdomen pelvis    *Prescription sent to the pharmacy as noted    *Discussion of ongoing management of this patient's care included: n/a  *Comorbidities contributing to the complexity of decision making: n/a  *External charts reviewed: n/a  *Additional sources of history: n/a    Shared decision making was done by: patient, myself.                                     Medical Decision Making      Disposition and Plan     Clinical Impression:  1. Abdominal pain of unknown etiology         Disposition:  Discharge  6/4/2024  2:15 pm    Follow-up:  Nara Harkins MD  [address] [place] [address]  [address] [place] [id number]  [phone]    Follow up            Medications Prescribed:  Discharge Medication List as of 6/4/2024  2:19 PM        START taking these medications    Details   famotidine (PEPCID) 20 MG Oral Tab Take 1 tablet (20 mg total) by mouth 2 (two)  times daily as needed for Heartburn., Normal, Disp-30 tablet, R-0      dicyclomine 20 MG Oral Tab Take 1 tablet (20 mg total) by mouth 4 (four) times daily as needed., Normal, Disp-30 tablet, R-0

## 2024-06-04 NOTE — DISCHARGE INSTRUCTIONS
Take Bentyl until after gastric emptying study.  After that you may take it up to 4 times a day as needed.

## 2024-06-04 NOTE — ED INITIAL ASSESSMENT (HPI)
Pt by EMS from home c/o severe acute onset periumbilical pain and nausea. Also reports tarry stool, denies vomiting, + blood in stool, denies thinners.

## 2024-06-19 ENCOUNTER — HOSPITAL ENCOUNTER (OUTPATIENT)
Age: 49
Discharge: HOME | End: 2024-06-19

## 2024-06-19 VITALS
DIASTOLIC BLOOD PRESSURE: 70 MMHG | OXYGEN SATURATION: 97 % | TEMPERATURE: 97 F | HEIGHT: 67 IN | WEIGHT: 180 LBS | BODY MASS INDEX: 28.25 KG/M2 | HEART RATE: 90 BPM | RESPIRATION RATE: 18 BRPM | SYSTOLIC BLOOD PRESSURE: 105 MMHG

## 2024-06-19 DIAGNOSIS — Z48.02 ENCOUNTER FOR REMOVAL OF SUTURES: Primary | ICD-10-CM

## 2024-06-19 PROCEDURE — 99213 OFFICE O/P EST LOW 20 MIN: CPT | Performed by: PHYSICIAN ASSISTANT

## 2024-06-19 NOTE — DISCHARGE INSTRUCTIONS
Continue to keep clean and covered  Watch for signs of infection- redness, swelling, drainage, pain  Use mupirocin ointment on your abrasions  Follow up with primary care doctor for wound recheck in 3-4 days  Return to the ER if symptoms worsen

## 2024-06-19 NOTE — ED PROVIDER NOTES
Patient Seen in: [facility]      History     Chief Complaint   Patient presents with    Suture Removal     Stated Complaint: Stich removal    Subjective:   The history is provided by the patient.       48-year-old female presents for suture removal.  Patient unfortunately attempted suicide earlier this month and/both wrists.  Evaluated at an outside ER.  The right wrist was initially sutured and has been healing well.  The left wrist had delayed suture placement and has only been present for 10 days. Maintains full ROM of the wrists. No surrounding erythema, fluctuance, Pt currently denies any HI, SI, hallucinations.     Objective:   Past Medical History:    Anxiety state    Back problem    Chronic pain    CKD (chronic kidney disease) stage 3, GFR 30-59 ml/min ([facility])    Creatinine 1.23 and GFR 53 ED visit 8/28/2020    Depression    Epilepsy ([facility])    Migraines    botox , and nerve blocks     Ovarian cyst    left 3.2cm  seen on CT in ER    Pneumonia due to COVID-19 virus    8/20    Pneumonia due to organism    covid 11/2020 , had to hospitalized 4 days     Pseudoseizure    Seizure disorder ([facility])    last seizure 2 weeks ago              Past Surgical History:   Procedure Laterality Date    Appendectomy      Appendectomy      Brain surgery      neurostim implanted./ removed     Egd N/A 09/23/2021    Procedure: ESOPHAGOGASTRODUODENOSCOPY w/ bxs, COLONOSCOPY w/ bxs;  Surgeon: Phil Murphy MD;  Location: [facility]    Neurostimulator electrode      2 yrs ago    Other      frontal lobectomy    Other      facial surgery    Other      shoulder replacement    Other      cervical fusion    Other      frontal and temporal lobectomies for seizures    Other      bilat shoulder surgery    Other      Neurostimulator history.  This was removed but wires are still present.  Unable to get brain MRI    Other surgical history      cervical fusion    Other surgical history      shoulder replacement     Other surgical history      complete jaw reconstruction     Spine surgery procedure unlisted      cervical fusion     Tonsillectomy      Total shoulder arthroplasty                  Social History     Socioeconomic History    Marital status: Life Partner   Tobacco Use    Smoking status: Never     Passive exposure: Never    Smokeless tobacco: Never   Vaping Use    Vaping status: Never Used   Substance and Sexual Activity    Alcohol use: Yes     Alcohol/week: 1.0 standard drink of alcohol     Types: 1 Standard drinks or equivalent per week    Drug use: Yes     Types: Cannabis     Comment: nightly    Sexual activity: Yes     Partners: Female   Other Topics Concern    Caffeine Concern No    Exercise Yes    Seat Belt Yes   Social History Narrative    ** Merged History Encounter **          Social Determinants of Health     Food Insecurity: No Food Insecurity (6/5/2024)    Received from [facility]    Food Insecurity     Currently or in the past 3 months, have you worried your food would run out before you had money to buy more?: No     In the past 12 months, have you run out of food or been unable to get more?: No   Transportation Needs: No Transportation Needs (6/5/2024)    Received from [facility]    Transportation Needs     Medical Transportation Needs?: No    Received from [facility]    Housing Stability              Review of Systems   Respiratory: Negative.     Cardiovascular: Negative.    Gastrointestinal: Negative.        Positive for stated complaint: Stich removal  Other systems are as noted in HPI.  Constitutional and vital signs reviewed.      All other systems reviewed and negative except as noted above.    Physical Exam     ED Triage Vitals [06/19/24 1015]   /70   Pulse 90   Resp 18   Temp 97.2 °F (36.2 °C)   Temp src Temporal   SpO2 97 %   O2 Device None (Room air)       Current Vitals:   Vital Signs  BP: 105/70  Pulse: 90  Resp: 18  Temp:  97.2 °F (36.2 °C)  Temp src: Temporal    Oxygen Therapy  SpO2: 97 %  O2 Device: None (Room air)            Physical Exam  Vitals and nursing note reviewed.   Constitutional:       Appearance: Normal appearance.   Pulmonary:      Effort: Pulmonary effort is normal.   Musculoskeletal:      Comments: No bony tenderness. Compartments are soft.    Skin:     Comments: Right volar laceration with 3 sutures in place. Well approximated. Well healed    Left volar laceration with 5 sutures in place. No signs of secondary infection . Wound still healing   Neurological:      General: No focal deficit present.      Mental Status: She is alert and oriented to person, place, and time.   Psychiatric:         Mood and Affect: Mood normal.         Behavior: Behavior normal.               ED Course   Labs Reviewed - No data to display                   MDM       On exam the patient is in no acute distress. Denies any SI, HI. Right wrist with well approximated wound, 3 sutures in place. No signs of secondary infection wound cleaned and sutures removed without compilations.     Left wrist with 5 sutures in place wound without secondary infection. Wound still healing, I believe the sutures need to remain for additional 4-5 days. Discussed with patient wound care and close follow up for suture removal with primary. Pt voiced understanding and in agreement.                            Medical Decision Making  Problems Addressed:  Encounter for removal of sutures: acute illness or injury        Disposition and Plan     Clinical Impression:  1. Encounter for removal of sutures         Disposition:  Discharge  6/19/2024 10:39 am    Follow-up:  Nara Harkins MD  [address] [place] [address]  [address] [place] [id number]  [phone]                Medications Prescribed:  Discharge Medication List as of 6/19/2024 10:40 AM        START taking these medications    Details   mupirocin 2 % External Ointment Apply 1 Application topically 3 (three) times daily for  14 days., Normal, Disp-1 each, R-0

## 2024-06-28 ENCOUNTER — HOSPITAL ENCOUNTER (OUTPATIENT)
Age: 49
Discharge: HOME | End: 2024-06-28

## 2024-06-28 VITALS
OXYGEN SATURATION: 100 % | HEART RATE: 78 BPM | SYSTOLIC BLOOD PRESSURE: 108 MMHG | HEIGHT: 67 IN | WEIGHT: 175 LBS | TEMPERATURE: 98 F | BODY MASS INDEX: 27.47 KG/M2 | DIASTOLIC BLOOD PRESSURE: 84 MMHG | RESPIRATION RATE: 22 BRPM

## 2024-06-28 DIAGNOSIS — Z48.02 ENCOUNTER FOR REMOVAL OF SUTURES: Primary | ICD-10-CM

## 2024-06-28 PROCEDURE — 99212 OFFICE O/P EST SF 10 MIN: CPT | Performed by: NURSE PRACTITIONER

## 2024-06-28 NOTE — ED QUICK NOTES
C-SSRS high risk  Pt states she is in an OP program and under care for her recent suicide attempt  Declining further resources from [facility] at this time

## 2024-07-03 ENCOUNTER — HOSPITAL ENCOUNTER (EMERGENCY)
Facility: HOSPITAL | Age: 49
Discharge: HOME | End: 2024-07-03
Attending: EMERGENCY MEDICINE
Payer: MEDICARE

## 2024-07-03 ENCOUNTER — APPOINTMENT (OUTPATIENT)
Dept: ULTRASOUND IMAGING | Facility: HOSPITAL | Age: 49
End: 2024-07-03
Payer: MEDICARE

## 2024-07-03 ENCOUNTER — HOSPITAL ENCOUNTER (OUTPATIENT)
Age: 49
Discharge: ANOTHER HOSPITAL | End: 2024-07-03
Payer: MEDICARE

## 2024-07-03 VITALS
HEIGHT: 67 IN | DIASTOLIC BLOOD PRESSURE: 73 MMHG | WEIGHT: 175 LBS | HEART RATE: 66 BPM | BODY MASS INDEX: 27.47 KG/M2 | TEMPERATURE: 98 F | SYSTOLIC BLOOD PRESSURE: 92 MMHG | OXYGEN SATURATION: 100 % | RESPIRATION RATE: 18 BRPM

## 2024-07-03 VITALS
RESPIRATION RATE: 20 BRPM | HEART RATE: 79 BPM | OXYGEN SATURATION: 98 % | DIASTOLIC BLOOD PRESSURE: 72 MMHG | HEIGHT: 67 IN | BODY MASS INDEX: 27.47 KG/M2 | WEIGHT: 175 LBS | SYSTOLIC BLOOD PRESSURE: 107 MMHG | TEMPERATURE: 99 F

## 2024-07-03 DIAGNOSIS — I82.4Y9 ACUTE DEEP VEIN THROMBOSIS (DVT) OF PROXIMAL VEIN OF LOWER EXTREMITY, UNSPECIFIED LATERALITY (HCC): Primary | ICD-10-CM

## 2024-07-03 DIAGNOSIS — M79.662 PAIN OF LEFT CALF: Primary | ICD-10-CM

## 2024-07-03 LAB
ALBUMIN SERPL-MCNC: 4 G/DL (ref 3.4–5)
ALBUMIN/GLOB SERPL: 1.1 {RATIO} (ref 1–2)
ALP LIVER SERPL-CCNC: 63 U/L
ALT SERPL-CCNC: 14 U/L
ANION GAP SERPL CALC-SCNC: 8 MMOL/L (ref 0–18)
AST SERPL-CCNC: 9 U/L (ref 15–37)
BASOPHILS # BLD AUTO: 0.03 X10(3) UL (ref 0–0.2)
BASOPHILS NFR BLD AUTO: 0.5 %
BILIRUB SERPL-MCNC: 0.2 MG/DL (ref 0.1–2)
BUN BLD-MCNC: 12 MG/DL (ref 9–23)
CALCIUM BLD-MCNC: 8.7 MG/DL (ref 8.5–10.1)
CHLORIDE SERPL-SCNC: 113 MMOL/L (ref 98–112)
CO2 SERPL-SCNC: 18 MMOL/L (ref 21–32)
CREAT BLD-MCNC: 1.13 MG/DL
EGFRCR SERPLBLD CKD-EPI 2021: 60 ML/MIN/1.73M2 (ref 60–?)
EOSINOPHIL # BLD AUTO: 0.08 X10(3) UL (ref 0–0.7)
EOSINOPHIL NFR BLD AUTO: 1.3 %
ERYTHROCYTE [DISTWIDTH] IN BLOOD BY AUTOMATED COUNT: 13.6 %
GLOBULIN PLAS-MCNC: 3.5 G/DL (ref 2.8–4.4)
GLUCOSE BLD-MCNC: 83 MG/DL (ref 70–99)
HCT VFR BLD AUTO: 38.2 %
HGB BLD-MCNC: 12.7 G/DL
IMM GRANULOCYTES # BLD AUTO: 0.02 X10(3) UL (ref 0–1)
IMM GRANULOCYTES NFR BLD: 0.3 %
INR BLD: 0.96 (ref 0.8–1.2)
LYMPHOCYTES # BLD AUTO: 1.86 X10(3) UL (ref 1–4)
LYMPHOCYTES NFR BLD AUTO: 29.4 %
MCH RBC QN AUTO: 28.5 PG (ref 26–34)
MCHC RBC AUTO-ENTMCNC: 33.2 G/DL (ref 31–37)
MCV RBC AUTO: 85.8 FL
MONOCYTES # BLD AUTO: 0.43 X10(3) UL (ref 0.1–1)
MONOCYTES NFR BLD AUTO: 6.8 %
NEUTROPHILS # BLD AUTO: 3.9 X10 (3) UL (ref 1.5–7.7)
NEUTROPHILS # BLD AUTO: 3.9 X10(3) UL (ref 1.5–7.7)
NEUTROPHILS NFR BLD AUTO: 61.7 %
OSMOLALITY SERPL CALC.SUM OF ELEC: 287 MOSM/KG (ref 275–295)
PLATELET # BLD AUTO: 239 10(3)UL (ref 150–450)
PLATELETS.RETICULATED NFR BLD AUTO: 2.5 % (ref 0–7)
POTASSIUM SERPL-SCNC: 3.6 MMOL/L (ref 3.5–5.1)
PROT SERPL-MCNC: 7.5 G/DL (ref 6.4–8.2)
PROTHROMBIN TIME: 12.7 SECONDS (ref 11.6–14.8)
RBC # BLD AUTO: 4.45 X10(6)UL
SODIUM SERPL-SCNC: 139 MMOL/L (ref 136–145)
WBC # BLD AUTO: 6.3 X10(3) UL (ref 4–11)

## 2024-07-03 PROCEDURE — 85025 COMPLETE CBC W/AUTO DIFF WBC: CPT | Performed by: EMERGENCY MEDICINE

## 2024-07-03 PROCEDURE — 93971 EXTREMITY STUDY: CPT

## 2024-07-03 PROCEDURE — 99284 EMERGENCY DEPT VISIT MOD MDM: CPT

## 2024-07-03 PROCEDURE — 36415 COLL VENOUS BLD VENIPUNCTURE: CPT

## 2024-07-03 PROCEDURE — 99285 EMERGENCY DEPT VISIT HI MDM: CPT

## 2024-07-03 PROCEDURE — 80053 COMPREHEN METABOLIC PANEL: CPT | Performed by: EMERGENCY MEDICINE

## 2024-07-03 PROCEDURE — 99215 OFFICE O/P EST HI 40 MIN: CPT | Performed by: NURSE PRACTITIONER

## 2024-07-03 PROCEDURE — 85610 PROTHROMBIN TIME: CPT | Performed by: EMERGENCY MEDICINE

## 2024-07-03 NOTE — ED PROVIDER NOTES
Patient Seen in: [facility] Emergency Department      History     Chief Complaint   Patient presents with    Swelling Edema     Stated Complaint: leg pain    Subjective:   HPI    Patient is a 48-year-old female here with right calf pain is going on for 4 days.  No trauma.  No discoloration.  She was sent over from the immediate care for evaluation.  Was seen recently at the immediate care for suture removal the patient had suicide attempt and cut both wrists.  No shortness of breath no chest pain no other associate symptoms or no other complaints.    Objective:   Past Medical History:    Anxiety state    Back problem    Chronic pain    CKD (chronic kidney disease) stage 3, GFR 30-59 ml/min ([facility])    Creatinine 1.23 and GFR 53 ED visit 8/28/2020    Depression    Epilepsy ([facility])    Migraines    botox , and nerve blocks     Ovarian cyst    left 3.2cm  seen on CT in ER    Pneumonia due to COVID-19 virus    8/20    Pneumonia due to organism    covid 11/2020 , had to hospitalized 4 days     Pseudoseizure    Seizure disorder ([facility])    last seizure 2 weeks ago              Past Surgical History:   Procedure Laterality Date    Appendectomy      Appendectomy      Brain surgery      neurostim implanted./ removed     Egd N/A 09/23/2021    Procedure: ESOPHAGOGASTRODUODENOSCOPY w/ bxs, COLONOSCOPY w/ bxs;  Surgeon: Phil Murphy MD;  Location: [facility]    Neurostimulator electrode      2 yrs ago    Other      frontal lobectomy    Other      facial surgery    Other      shoulder replacement    Other      cervical fusion    Other      frontal and temporal lobectomies for seizures    Other      bilat shoulder surgery    Other      Neurostimulator history.  This was removed but wires are still present.  Unable to get brain MRI    Other surgical history      cervical fusion    Other surgical history      shoulder replacement    Other surgical history      complete jaw reconstruction     Spine surgery procedure  unlisted      cervical fusion     Tonsillectomy      Total shoulder arthroplasty                  Social History     Socioeconomic History    Marital status: Life Partner   Tobacco Use    Smoking status: Never     Passive exposure: Never    Smokeless tobacco: Never   Vaping Use    Vaping status: Never Used   Substance and Sexual Activity    Alcohol use: Not Currently     Comment: rare    Drug use: Yes     Types: Cannabis     Comment: nightly    Sexual activity: Yes     Partners: Female   Other Topics Concern    Caffeine Concern No    Exercise Yes    Seat Belt Yes   Social History Narrative    ** Merged History Encounter **          Social Determinants of Health     Food Insecurity: No Food Insecurity (6/5/2024)    Received from [facility]    Food Insecurity     Currently or in the past 3 months, have you worried your food would run out before you had money to buy more?: No     In the past 12 months, have you run out of food or been unable to get more?: No   Transportation Needs: No Transportation Needs (6/5/2024)    Received from [facility]    Transportation Needs     Medical Transportation Needs?: No    Received from [facility]    Housing Stability              Review of Systems    Positive for stated Chief Complaint: Swelling Edema    Other systems are as noted in HPI.  Constitutional and vital signs reviewed.      All other systems reviewed and negative except as noted above.    Physical Exam     ED Triage Vitals [07/03/24 1534]   /78   Pulse 77   Resp 18   Temp 97.6 °F (36.4 °C)   Temp src Temporal   SpO2 98 %   O2 Device None (Room air)       Current Vitals:   Vital Signs  BP: 117/78  Pulse: 77  Resp: 18  Temp: 97.6 °F (36.4 °C)  Temp src: Temporal    Oxygen Therapy  SpO2: 98 %  O2 Device: None (Room air)            Physical Exam  Vitals and nursing note reviewed.   Constitutional:       General: She is not in acute distress.     Appearance: She  is well-developed. She is not toxic-appearing.   HENT:      Head: Normocephalic and atraumatic.   Eyes:      General: No scleral icterus.     Conjunctiva/sclera: Conjunctivae normal.   Cardiovascular:      Rate and Rhythm: Normal rate.   Pulmonary:      Effort: Pulmonary effort is normal. No respiratory distress.   Abdominal:      General: There is no distension.   Musculoskeletal:         General: Tenderness present. Normal range of motion.      Cervical back: Normal range of motion and neck supple.   Skin:     General: Skin is warm and dry.      Findings: No rash.   Neurological:      Mental Status: She is alert and oriented to person, place, and time.      Motor: No abnormal muscle tone.      Coordination: Coordination normal.   Psychiatric:         Behavior: Behavior normal.              ED Course     Labs Reviewed   COMP METABOLIC PANEL (14) - Abnormal; Notable for the following components:       Result Value    Chloride 113 (*)     CO2 18.0 (*)     Creatinine 1.13 (*)     AST 9 (*)     All other components within normal limits   PROTHROMBIN TIME (PT) - Normal   CBC WITH DIFFERENTIAL WITH PLATELET    Narrative:     The following orders were created for panel order CBC With Differential With Platelet.  Procedure                               Abnormality         Status                     ---------                               -----------         ------                     CBC W/ DIFFERENTIAL[[id number]]                              Final result                 Please view results for these tests on the individual orders.   CBC W/ DIFFERENTIAL                       MDM          -Pulse oximetry was interpreted by me and was normal.  Pulse oximeter was ordered to monitor patient for hypoxia.             -Comorbidities did add complexity to the management are mentioned in the HPI above        -I personally reviewed the prior external notes and the medical record to obtain additional history  -reviewed last ED visit  patient had presented for suicide attempt sustained numerous lacerations.  Sutures were removed.        -DDX: Includes but not limited to -phlebitis, thrombophlebitis, DVT               -I personally reviewed the US findings and it shows superficial phlebitis and DVT  Please refer to radiology report for official interpretation    US VENOUS DOPPLER LEG RIGHT - DIAG IMG (CPT=93971)   Final Result   PROCEDURE:  US VENOUS DOPPLER LEG RIGHT - DIAG IMG (CPT=93971)       COMPARISON:  None.       INDICATIONS:  Right lower extremity pain and swelling       TECHNIQUE:  Real time, grey scale, and duplex ultrasound was used to    evaluate the lower extremity venous system. B-mode two-dimensional images    of the vascular structures, Doppler spectral analysis, and color flow.     Doppler imaging were performed.  The    following veins were imaged:  Common, deep, and superficial femoral,    popliteal, sapheno-femoral junction, posterior tibial veins, and the    contralateral common femoral vein.       PATIENT STATED HISTORY: (As transcribed by Technologist)  Patient with    right calf pain for the past 4 days. Denies trauma.        FINDINGS:     EXTREMITY EXAMINED:  Right lower extremity and left common femoral vein   SAPHENOFEMORAL JUNCTION:  Unremarkable without evidence of thrombus   THROMBI:  There is occlusive thrombus in the proximal distal right greater    saphenous vein at the level of the right calf extending over a 20 cm    segment.  There is also nonocclusive thrombus noted in 1 of mid right    posterior tibial veins.   COMPRESSION:  Normal compressibility, phasicity, and augmentation with the    exception of above.   OTHER:  Negative.                         =====   CONCLUSION:  Deep and superficial venous thrombosis in the right lower    extremity as above.       Critical results were discussed with the emergency room charge nurse Alma    at 1623 hours on 7/3/2024. Critical results were read back.       LOCATION:   [id number]               Dictated by (CST): Rosendo Hicks MD on 7/03/2024 at 4:22 PM        Finalized by (CST): Rosendo Hicks MD on 7/03/2024 at 4:25 PM                 Labs Reviewed   COMP METABOLIC PANEL (14) - Abnormal; Notable for the following components:       Result Value    Chloride 113 (*)     CO2 18.0 (*)     Creatinine 1.13 (*)     AST 9 (*)     All other components within normal limits   PROTHROMBIN TIME (PT) - Normal   CBC WITH DIFFERENTIAL WITH PLATELET    Narrative:     The following orders were created for panel order CBC With Differential With Platelet.  Procedure                               Abnormality         Status                     ---------                               -----------         ------                     CBC W/ DIFFERENTIAL[[id number]]                              Final result                 Please view results for these tests on the individual orders.   CBC W/ DIFFERENTIAL     Ultrasound is negative for DVT.  CBC is normal chemistries show bicarb 18.  Anion gap is normal.  Creatinine is 1.1.  Coags are normal.  Discussed with case management.  Xarelto pack started was arranged.  She was given first dose here.  She is to follow-up with PCP and hematology.  No signs symptoms of PE.  Patient demonstrates understanding and agrees to plan.  Discharged home stable condition.                                     Medical Decision Making      Disposition and Plan     Clinical Impression:  1. Acute deep vein thrombosis (DVT) of proximal vein of lower extremity, unspecified laterality (HCC)         Disposition:  Discharge  7/3/2024  6:11 pm    Follow-up:  Nara Harkins MD  [address] [facility]  [address]  [facility] [id number] [phone]    Schedule an appointment as soon as possible for a visit      Akiko Troncoso MD  [facility] Tessa Rosenberg [address] [facility]  [facility] [address]  [phone]    Follow up            Medications Prescribed:  Current Discharge Medication List         START taking these medications    Details   rivaroxaban 15 & 20 MG Oral Tablet Therapy Pack Take As Directed based on package instructions: Days 1-21: 15 mg by mouth twice daily Days 22-30: 20 mg by mouth once daily  Qty: 1 each, Refills: 0

## 2024-07-03 NOTE — CM/SW NOTE
Emergency Department Discharge Plan    Azeb Millan is a 48 year old female who presented to the ED with a DVT.  ED Case Manager was asked to assist in arranging for home anticoagulation.    Azeb Millan and I discussed indications for anticoagulation. Patient verbalized understanding that chest pain, dyspnea, or hemoptysis could indicate pulmonary embolus, and that if those symptoms occur he should seek medical care as soon as possible.    We also reviewed side effect of bleeding as the major risk of anticoagulation. Patient is aware to monitor for black, tarry, or maroon stool as this could represent intestinal blood loss. Any excessive bruising, unexplained pain, or weakness also are symptoms that require medical attention.     Prescription for Xarelto was e-scribed into patient's pharmacy.  Patient has Patient additionally provided with promotional discount cards.

## 2024-07-03 NOTE — ED INITIAL ASSESSMENT (HPI)
Pt c/o right ankle pain and swelling to her left medial ankle x 1 week. Today pt states pain and swelling has moved up to her right medial calf. Pt denies chest pain and shortness of breath.

## 2024-07-03 NOTE — ED PROVIDER NOTES
Patient Seen in: [facility]      History     Chief Complaint   Patient presents with    Leg Pain     Stated Complaint: right calf pain x 4 days    Subjective:   HPI    Pt is a 48yr old female who is here day with c/o left calf pain x the last 4 days.  Denies injury or trauma.  Denies fever chills,   denies sob, chest pain.     Objective:   Past Medical History:    Anxiety state    Back problem    Chronic pain    CKD (chronic kidney disease) stage 3, GFR 30-59 ml/min ([facility])    Creatinine 1.23 and GFR 53 ED visit 8/28/2020    Depression    Epilepsy ([facility])    Migraines    botox , and nerve blocks     Ovarian cyst    left 3.2cm  seen on CT in ER    Pneumonia due to COVID-19 virus    8/20    Pneumonia due to organism    covid 11/2020 , had to hospitalized 4 days     Pseudoseizure    Seizure disorder ([facility])    last seizure 2 weeks ago              Past Surgical History:   Procedure Laterality Date    Appendectomy      Appendectomy      Brain surgery      neurostim implanted./ removed     Egd N/A 09/23/2021    Procedure: ESOPHAGOGASTRODUODENOSCOPY w/ bxs, COLONOSCOPY w/ bxs;  Surgeon: Phil Murphy MD;  Location: [facility]    Neurostimulator electrode      2 yrs ago    Other      frontal lobectomy    Other      facial surgery    Other      shoulder replacement    Other      cervical fusion    Other      frontal and temporal lobectomies for seizures    Other      bilat shoulder surgery    Other      Neurostimulator history.  This was removed but wires are still present.  Unable to get brain MRI    Other surgical history      cervical fusion    Other surgical history      shoulder replacement    Other surgical history      complete jaw reconstruction     Spine surgery procedure unlisted      cervical fusion     Tonsillectomy      Total shoulder arthroplasty                  Social History     Socioeconomic History    Marital status: Life Partner   Tobacco Use    Smoking status: Never      Passive exposure: Never    Smokeless tobacco: Never   Vaping Use    Vaping status: Never Used   Substance and Sexual Activity    Alcohol use: Yes     Alcohol/week: 1.0 standard drink of alcohol     Types: 1 Standard drinks or equivalent per week    Drug use: Yes     Types: Cannabis     Comment: nightly    Sexual activity: Yes     Partners: Female   Other Topics Concern    Caffeine Concern No    Exercise Yes    Seat Belt Yes   Social History Narrative    ** Merged History Encounter **          Social Determinants of Health     Food Insecurity: No Food Insecurity (6/5/2024)    Received from [facility]    Food Insecurity     Currently or in the past 3 months, have you worried your food would run out before you had money to buy more?: No     In the past 12 months, have you run out of food or been unable to get more?: No   Transportation Needs: No Transportation Needs (6/5/2024)    Received from [facility]    Transportation Needs     Medical Transportation Needs?: No    Received from [facility]    Housing Stability              Review of Systems    Positive for stated Chief Complaint: Leg Pain    Other systems are as noted in HPI.  Constitutional and vital signs reviewed.      All other systems reviewed and negative except as noted above.    Physical Exam     ED Triage Vitals [07/03/24 1500]   /72   Pulse 79   Resp 20   Temp 98.7 °F (37.1 °C)   Temp src Temporal   SpO2 98 %   O2 Device None (Room air)       Current Vitals:   Vital Signs  BP: 107/72  Pulse: 79  Resp: 20  Temp: 98.7 °F (37.1 °C)  Temp src: Temporal    Oxygen Therapy  SpO2: 98 %  O2 Device: None (Room air)            Physical Exam    Adult physical exam:     VS: Vital signs reviewed. O2 saturation within normal limits for this patient     General: Patient is awake and alert, oriented to person, place and time. Not in acute distress.      HEENT: Head is normocephalic atraumatic. Pupils  reactive bilaterally.  EOMs intact.  No facial droop or slurred speech.  No oral pallor. Mucous membranes moist.      Neck: No cervical lymphadenopathy. No stridor. Supple. No meningsmus.      Heart: S1-S2.  Regular rate and rhythm.       Lungs: good inspiratory effort. +air entry bilaterally without wheezes, rhonchi, crackles.  No accessory muscle use or tachypnea.       Abdomen: Soft, nontender, nondistended.  Active bowel sounds present.       Back: No CVA tenderness.     Extremities: No edema.  Pulses 2+ extremities.   Brisk capillary refill noted.   There is moderate pain to the lateral left calf that extends from the ankle to the knee.  There is a erythema noted that also extends from the ankle to the Knee.  No warmth to touch.     Skin: Normal skin turgor     CNS: Moves all 4 extremities.  Interacts appropriately.  No tremor.  No gait abnormality           ED Course   Labs Reviewed - No data to display       I have personally  reviewed available prior medical records for any recent pertinent discharge summaries/testing. Patient/family updated on results and plan, a verbalized understanding and agreement with the plan.  I explained to the patient that emergent conditions may arise and to go to the ER for new, worsening or any persistent conditions. I've explained the importance of taking all medicatons as prescribed, follow up, and return precuations,  All questions answered.    Please note that this report has been produced using speech recognition software and may contain errors related to that system including, but not limited to, errors in grammar, punctuation, and spelling, as well as words and phrases that possibly may have been recognized inappropriately.  If there are any questions or concerns, contact the dictating provider for clarification.         MDM        Patient was sent to the Emergency department for advanced imaging that is not available here at the [facility]                                    Medical Decision Making      Disposition and Plan     Clinical Impression:  1. Pain of left calf         Disposition:  Ic to ed  7/3/2024  3:11 pm    Follow-up:  Nara Harkins MD  [address] [place] [address]  [address] [place] [id number]  [phone]                Medications Prescribed:  Discharge Medication List as of 7/3/2024  3:15 PM

## 2024-07-15 ENCOUNTER — HOSPITAL ENCOUNTER (OUTPATIENT)
Age: 49
Discharge: HOME | End: 2024-07-15
Payer: MEDICARE

## 2024-07-15 VITALS
DIASTOLIC BLOOD PRESSURE: 90 MMHG | BODY MASS INDEX: 26.68 KG/M2 | OXYGEN SATURATION: 99 % | RESPIRATION RATE: 18 BRPM | SYSTOLIC BLOOD PRESSURE: 117 MMHG | TEMPERATURE: 98 F | WEIGHT: 170 LBS | HEIGHT: 67 IN | HEART RATE: 88 BPM

## 2024-07-15 DIAGNOSIS — S00.412A ABRASION OF ANTIHELIX OF LEFT EAR, INITIAL ENCOUNTER: Primary | ICD-10-CM

## 2024-07-15 DIAGNOSIS — H61.002 PERICHONDRITIS OF LEFT EAR: ICD-10-CM

## 2024-07-15 DIAGNOSIS — R42 DIZZINESS: ICD-10-CM

## 2024-07-15 LAB
#MXD IC: 0.5 X10ˆ3/UL (ref 0.1–1)
BUN BLD-MCNC: 12 MG/DL (ref 7–18)
CHLORIDE BLD-SCNC: 109 MMOL/L (ref 98–112)
CO2 BLD-SCNC: 20 MMOL/L (ref 21–32)
CREAT BLD-MCNC: 1.2 MG/DL
EGFRCR SERPLBLD CKD-EPI 2021: 56 ML/MIN/1.73M2 (ref 60–?)
GLUCOSE BLD-MCNC: 99 MG/DL (ref 70–99)
HCT VFR BLD AUTO: 37.9 %
HCT VFR BLD CALC: 39 %
HGB BLD-MCNC: 11.8 G/DL
ISTAT IONIZED CALCIUM FOR CHEM 8: 1.22 MMOL/L (ref 1.12–1.32)
LYMPHOCYTES # BLD AUTO: 1.6 X10ˆ3/UL (ref 1–4)
LYMPHOCYTES NFR BLD AUTO: 29.1 %
MCH RBC QN AUTO: 27.2 PG (ref 26–34)
MCHC RBC AUTO-ENTMCNC: 31.1 G/DL (ref 31–37)
MCV RBC AUTO: 87.3 FL (ref 80–100)
MIXED CELL %: 9.3 %
NEUTROPHILS # BLD AUTO: 3.3 X10ˆ3/UL (ref 1.5–7.7)
NEUTROPHILS NFR BLD AUTO: 61.6 %
PLATELET # BLD AUTO: 285 X10ˆ3/UL (ref 150–450)
POTASSIUM BLD-SCNC: 3.9 MMOL/L (ref 3.6–5.1)
RBC # BLD AUTO: 4.34 X10ˆ6/UL
SODIUM BLD-SCNC: 141 MMOL/L (ref 136–145)
TROPONIN I BLD-MCNC: <0.02 NG/ML
WBC # BLD AUTO: 5.4 X10ˆ3/UL (ref 4–11)

## 2024-07-15 PROCEDURE — 85025 COMPLETE CBC W/AUTO DIFF WBC: CPT | Performed by: NURSE PRACTITIONER

## 2024-07-15 PROCEDURE — 99214 OFFICE O/P EST MOD 30 MIN: CPT | Performed by: NURSE PRACTITIONER

## 2024-07-15 PROCEDURE — 93000 ELECTROCARDIOGRAM COMPLETE: CPT | Performed by: NURSE PRACTITIONER

## 2024-07-15 PROCEDURE — 80047 BASIC METABLC PNL IONIZED CA: CPT | Performed by: NURSE PRACTITIONER

## 2024-07-15 PROCEDURE — 84484 ASSAY OF TROPONIN QUANT: CPT | Performed by: NURSE PRACTITIONER

## 2024-07-15 RX ORDER — LEVOFLOXACIN 750 MG/1
750 TABLET, FILM COATED ORAL DAILY
Qty: 7 TABLET | Refills: 0 | Status: SHIPPED | OUTPATIENT
Start: 2024-07-15 | End: 2024-07-22

## 2024-07-15 NOTE — ED PROVIDER NOTES
Patient Seen in: [facility]      History     Chief Complaint   Patient presents with    Ear Problem Pain     Stated Complaint: ear pain x 4 days    Subjective:   The history is provided by the patient.       Patient is a pleasant 48-year-old female with epilepsy, migraines, chronic kidney disease, DVT, and chronic pain here for evaluation of left ear pain.  Symptoms started about 4 to 5 days ago and have been worsening since onset.  Left outer ear pain is reproducible with palpation.  Denies upper respiratory symptoms including nasal congestion, sore throat, cough or fever.  Denies known trauma to ear.    Upon my exam, patient explains she has had 2 episodes of dizziness which resulted in falls.  Episode occurred 2 days ago and 3 days ago. Episode of dizziness in which caused her to fall. Stood and ambulated a few feet and does not remember how she got there.  Patient states she felt drunk but was not.  Denies loss of consciousness or head injury with fall.  Patient is on Xarelto currently, which being treated for DVT.  This was diagnosed in ER on 7/3/2024.  Patient states that with the dizziness and fall she knows it was \"not a seizure.\"    No HA, lightheadedness, weakness, slurred speech.  No visions, nausea or vomiting.     Objective:   Past Medical History:    Anxiety state    Back problem    Chronic pain    CKD (chronic kidney disease) stage 3, GFR 30-59 ml/min ([facility])    Creatinine 1.23 and GFR 53 ED visit 8/28/2020    Depression    Epilepsy (HCC)    Migraines    botox , and nerve blocks     Ovarian cyst    left 3.2cm  seen on CT in ER    Pneumonia due to COVID-19 virus    8/20    Pneumonia due to organism    covid 11/2020 , had to hospitalized 4 days     Pseudoseizure    Seizure disorder (HCC)    last seizure 2 weeks ago              Past Surgical History:   Procedure Laterality Date    Appendectomy      Appendectomy      Brain surgery      neurostim implanted./ removed     Egd N/A  09/23/2021    Procedure: ESOPHAGOGASTRODUODENOSCOPY w/ bxs, COLONOSCOPY w/ bxs;  Surgeon: Phil Murphy MD;  [facility] [employer]    Neurostimulator electrode      2 yrs ago    Other      frontal lobectomy    Other      facial surgery    Other      shoulder replacement    Other      cervical fusion    Other      frontal and temporal lobectomies for seizures    Other      bilat shoulder surgery    Other      Neurostimulator history.  This was removed but wires are still present.  Unable to get brain MRI    Other surgical history      cervical fusion    Other surgical history      shoulder replacement    Other surgical history      complete jaw reconstruction     Spine surgery procedure unlisted      cervical fusion     Tonsillectomy      Total shoulder arthroplasty                  Social History     Socioeconomic History    Marital status: Life Partner   Tobacco Use    Smoking status: Never     Passive exposure: Never    Smokeless tobacco: Never   Vaping Use    Vaping status: Never Used   Substance and Sexual Activity    Alcohol use: Not Currently     Comment: rare    Drug use: Yes     Types: Cannabis     Comment: nightly    Sexual activity: Yes     Partners: Female   Other Topics Concern    Caffeine Concern No    Exercise Yes    Seat Belt Yes   Social History Narrative    ** Merged History Encounter **          Social Determinants of Health     Food Insecurity: No Food Insecurity (6/5/2024)    Received from [facility]    Food Insecurity     Currently or in the past 3 months, have you worried your food would run out before you had money to buy more?: No     In the past 12 months, have you run out of food or been unable to get more?: No   Transportation Needs: No Transportation Needs (6/5/2024)    Received from [facility]    Transportation Needs     Medical Transportation Needs?: No    Received from [facility]    Housing Stability               Review of Systems    Positive for stated Chief Complaint: Ear Problem Pain    Other systems are as noted in HPI.  Constitutional and vital signs reviewed.      All other systems reviewed and negative except as noted above.    Physical Exam     ED Triage Vitals   BP 07/15/24 1629 117/90   Pulse 07/15/24 1629 88   Resp 07/15/24 1629 18   Temp 07/15/24 1629 98 °F (36.7 °C)   Temp src 07/15/24 1626 Temporal   SpO2 07/15/24 1629 99 %   O2 Device 07/15/24 1626 None (Room air)       Current Vitals:   Vital Signs  BP: 117/90  Pulse: 88  Resp: 18  Temp: 98 °F (36.7 °C)  Temp src: Temporal    Oxygen Therapy  SpO2: 99 %  O2 Device: None (Room air)            Physical Exam  Vitals and nursing note reviewed.   Constitutional:       General: She is not in acute distress.     Appearance: Normal appearance. She is not ill-appearing, toxic-appearing or diaphoretic.          Comments: Small, linear, vertical fissure within left antihelical fossa of ear.  There is no obvious surrounding erythema or warmth.  There is surrounding tenderness.  There is no purulent drainage from affected area.   HENT:      Right Ear: External ear normal.      Mouth/Throat:      Mouth: Mucous membranes are moist.   Eyes:      Conjunctiva/sclera: Conjunctivae normal.      Pupils: Pupils are equal, round, and reactive to light.   Cardiovascular:      Rate and Rhythm: Normal rate and regular rhythm.      Pulses: Normal pulses.   Pulmonary:      Effort: Pulmonary effort is normal.   Neurological:      General: No focal deficit present.      Mental Status: She is alert and oriented to person, place, and time.      Cranial Nerves: Cranial nerves 2-12 are intact. No cranial nerve deficit or facial asymmetry.      Sensory: Sensation is intact.      Motor: Motor function is intact. No weakness.      Coordination: Coordination is intact.      Gait: Gait is intact.             ED Course     Labs Reviewed   POCT CBC - Abnormal; Notable for the following  components:       Result Value    HGB IC 11.8 (*)     All other components within normal limits   POCT ISTAT CHEM8 CARTRIDGE - Abnormal; Notable for the following components:    ISTAT TCO2 20 (*)     ISTAT Creatinine 1.20 (*)     eGFR-Cr 56 (*)     All other components within normal limits   ISTAT TROPONIN - Normal     EKG-normal sinus rhythm, when compared to May 3, 2024, no specific change.  No obvious ischemia               MDM                    Medical Decision Making  Patient presentation and plan of care reviewed and formulated with Dr. JASE Davila, emergency department physician.    Patient declines emergency department for evaluation.  I discussed risk factors and concern of dizziness resulting in falls, encouraged emergency department for further evaluation and neuroimaging if indicated.  Patient declines and states she will follow-up outpatient with neurology.  If concerning symptoms develop or returns, she will go to emergency department    Differentials include but are not limited to arrhythmia, anemia, dehydration, orthostatic hypotension, electrolyte imbalance, abrasion of the antihelix of the left ear, perichondritis and cellulitis.  CBC, Chem-8, EKG and troponin unremarkable/negative.  Will start topical Bactroban and Levaquin for perichondritis.  Close outpatient follow-up.  Strict ER precautions.  Patient agrees with plan of care.  All questions answered to patient's satisfaction.    Amount and/or Complexity of Data Reviewed  External Data Reviewed: labs.  Labs:  Decision-making details documented in ED Course.  ECG/medicine tests: ordered and independent interpretation performed. Decision-making details documented in ED Course.        Disposition and Plan     Clinical Impression:  1. Abrasion of antihelix of left ear, initial encounter    2. Dizziness    3. Perichondritis of left ear         Disposition:  Discharge  7/15/2024  6:21 pm    Follow-up:  Neurology  as soon as possible  Schedule an  appointment as soon as possible for a visit             Medications Prescribed:  Discharge Medication List as of 7/15/2024  6:24 PM        START taking these medications    Details   levoFLOXacin 750 MG Oral Tab Take 1 tablet (750 mg total) by mouth daily for 7 days., Normal, Disp-7 tablet, R-0

## 2024-07-16 LAB
ATRIAL RATE: 68 BPM
P AXIS: 63 DEGREES
P-R INTERVAL: 162 MS
Q-T INTERVAL: 372 MS
QRS DURATION: 86 MS
QTC CALCULATION (BEZET): 395 MS
R AXIS: 19 DEGREES
T AXIS: 47 DEGREES
VENTRICULAR RATE: 68 BPM

## 2024-08-05 ENCOUNTER — APPOINTMENT (OUTPATIENT)
Dept: CT IMAGING | Facility: HOSPITAL | Age: 49
End: 2024-08-05
Attending: STUDENT IN AN ORGANIZED HEALTH CARE EDUCATION/TRAINING PROGRAM
Payer: MEDICARE

## 2024-08-05 ENCOUNTER — HOSPITAL ENCOUNTER (EMERGENCY)
Facility: HOSPITAL | Age: 49
Discharge: HOME | End: 2024-08-05
Attending: STUDENT IN AN ORGANIZED HEALTH CARE EDUCATION/TRAINING PROGRAM
Payer: MEDICARE

## 2024-08-05 ENCOUNTER — APPOINTMENT (OUTPATIENT)
Dept: GENERAL RADIOLOGY | Facility: HOSPITAL | Age: 49
End: 2024-08-05
Attending: STUDENT IN AN ORGANIZED HEALTH CARE EDUCATION/TRAINING PROGRAM
Payer: MEDICARE

## 2024-08-05 VITALS
WEIGHT: 173 LBS | TEMPERATURE: 98 F | SYSTOLIC BLOOD PRESSURE: 97 MMHG | DIASTOLIC BLOOD PRESSURE: 64 MMHG | BODY MASS INDEX: 27.15 KG/M2 | OXYGEN SATURATION: 100 % | HEIGHT: 67 IN | RESPIRATION RATE: 14 BRPM | HEART RATE: 60 BPM

## 2024-08-05 DIAGNOSIS — S09.90XA CLOSED HEAD INJURY, INITIAL ENCOUNTER: Primary | ICD-10-CM

## 2024-08-05 DIAGNOSIS — G43.909 MIGRAINE WITHOUT STATUS MIGRAINOSUS, NOT INTRACTABLE, UNSPECIFIED MIGRAINE TYPE: ICD-10-CM

## 2024-08-05 LAB
ANION GAP SERPL CALC-SCNC: 7 MMOL/L (ref 0–18)
BASOPHILS # BLD AUTO: 0.02 X10(3) UL (ref 0–0.2)
BASOPHILS NFR BLD AUTO: 0.4 %
BUN BLD-MCNC: 14 MG/DL (ref 9–23)
CALCIUM BLD-MCNC: 8.6 MG/DL (ref 8.7–10.4)
CHLORIDE SERPL-SCNC: 115 MMOL/L (ref 98–112)
CO2 SERPL-SCNC: 19 MMOL/L (ref 21–32)
CREAT BLD-MCNC: 0.95 MG/DL
EGFRCR SERPLBLD CKD-EPI 2021: 74 ML/MIN/1.73M2 (ref 60–?)
EOSINOPHIL # BLD AUTO: 0.13 X10(3) UL (ref 0–0.7)
EOSINOPHIL NFR BLD AUTO: 2.3 %
ERYTHROCYTE [DISTWIDTH] IN BLOOD BY AUTOMATED COUNT: 14.1 %
GLUCOSE BLD-MCNC: 110 MG/DL (ref 70–99)
HCT VFR BLD AUTO: 35.1 %
HGB BLD-MCNC: 11.1 G/DL
IMM GRANULOCYTES # BLD AUTO: 0.02 X10(3) UL (ref 0–1)
IMM GRANULOCYTES NFR BLD: 0.4 %
LYMPHOCYTES # BLD AUTO: 1.23 X10(3) UL (ref 1–4)
LYMPHOCYTES NFR BLD AUTO: 22 %
MCH RBC QN AUTO: 27.1 PG (ref 26–34)
MCHC RBC AUTO-ENTMCNC: 31.6 G/DL (ref 31–37)
MCV RBC AUTO: 85.8 FL
MONOCYTES # BLD AUTO: 0.32 X10(3) UL (ref 0.1–1)
MONOCYTES NFR BLD AUTO: 5.7 %
NEUTROPHILS # BLD AUTO: 3.88 X10 (3) UL (ref 1.5–7.7)
NEUTROPHILS # BLD AUTO: 3.88 X10(3) UL (ref 1.5–7.7)
NEUTROPHILS NFR BLD AUTO: 69.2 %
OSMOLALITY SERPL CALC.SUM OF ELEC: 293 MOSM/KG (ref 275–295)
PLATELET # BLD AUTO: 238 10(3)UL (ref 150–450)
POTASSIUM SERPL-SCNC: 3.4 MMOL/L (ref 3.5–5.1)
RBC # BLD AUTO: 4.09 X10(6)UL
SODIUM SERPL-SCNC: 141 MMOL/L (ref 136–145)
WBC # BLD AUTO: 5.6 X10(3) UL (ref 4–11)

## 2024-08-05 PROCEDURE — 80048 BASIC METABOLIC PNL TOTAL CA: CPT | Performed by: STUDENT IN AN ORGANIZED HEALTH CARE EDUCATION/TRAINING PROGRAM

## 2024-08-05 PROCEDURE — 96361 HYDRATE IV INFUSION ADD-ON: CPT

## 2024-08-05 PROCEDURE — 73080 X-RAY EXAM OF ELBOW: CPT | Performed by: STUDENT IN AN ORGANIZED HEALTH CARE EDUCATION/TRAINING PROGRAM

## 2024-08-05 PROCEDURE — 99285 EMERGENCY DEPT VISIT HI MDM: CPT

## 2024-08-05 PROCEDURE — 96375 TX/PRO/DX INJ NEW DRUG ADDON: CPT

## 2024-08-05 PROCEDURE — 85025 COMPLETE CBC W/AUTO DIFF WBC: CPT | Performed by: STUDENT IN AN ORGANIZED HEALTH CARE EDUCATION/TRAINING PROGRAM

## 2024-08-05 PROCEDURE — 70450 CT HEAD/BRAIN W/O DYE: CPT | Performed by: STUDENT IN AN ORGANIZED HEALTH CARE EDUCATION/TRAINING PROGRAM

## 2024-08-05 PROCEDURE — 96374 THER/PROPH/DIAG INJ IV PUSH: CPT

## 2024-08-05 RX ORDER — SUMATRIPTAN 50 MG/1
50 TABLET, FILM COATED ORAL ONCE
Status: COMPLETED | OUTPATIENT
Start: 2024-08-05 | End: 2024-08-05

## 2024-08-05 RX ORDER — SUMATRIPTAN 6 MG/.5ML
6 INJECTION, SOLUTION SUBCUTANEOUS ONCE
Status: DISCONTINUED | OUTPATIENT
Start: 2024-08-05 | End: 2024-08-05

## 2024-08-05 RX ORDER — DIPHENHYDRAMINE HYDROCHLORIDE 50 MG/ML
25 INJECTION INTRAMUSCULAR; INTRAVENOUS ONCE
Status: COMPLETED | OUTPATIENT
Start: 2024-08-05 | End: 2024-08-05

## 2024-08-05 RX ORDER — METOCLOPRAMIDE HYDROCHLORIDE 5 MG/ML
10 INJECTION INTRAMUSCULAR; INTRAVENOUS ONCE
Status: COMPLETED | OUTPATIENT
Start: 2024-08-05 | End: 2024-08-05

## 2024-08-05 NOTE — ED INITIAL ASSESSMENT (HPI)
Brought by medic from home  history of found on the floor  when she wake up  loss of consciousness . Now complaining of pressure  on the head . History of fall Saturday morning  and hit her head . Patient is on blood  thinner . Received fentanyl  from EMS . Alert and awake oriented x4

## 2024-08-05 NOTE — ED PROVIDER NOTES
History     Chief Complaint   Patient presents with    Head Injury    Fall       HPI    49 year old female with history of epilepsy, migraines, DVT on Xarelto, kidney disease, mood disorder presents for evaluation of headache.  Patient states she fell early Saturday morning while she was walking the bathroom.  She has been feeling very weak and lightheaded since being on Seroquel, her doctors have been tapering her down which has progressively been improving, her last dose was prior to her fall.  She has since changed to trazodone.  She is having diffuse dull aching headache since her fall, no weakness or paresthesias.  No vision changes.          Past Medical History:    Anxiety state    Back problem    Chronic pain    CKD (chronic kidney disease) stage 3, GFR 30-59 ml/min ([facility])    Creatinine 1.23 and GFR 53 ED visit 8/28/2020    Depression    Epilepsy ([facility])    Migraines    botox , and nerve blocks     Ovarian cyst    left 3.2cm  seen on CT in ER    Pneumonia due to COVID-19 virus    8/20    Pneumonia due to organism    covid 11/2020 , had to hospitalized 4 days     Pseudoseizure    Seizure disorder (HCC)    last seizure 2 weeks ago       Past Surgical History:   Procedure Laterality Date    Appendectomy      Appendectomy      Brain surgery      neurostim implanted./ removed     Egd N/A 09/23/2021    Procedure: ESOPHAGOGASTRODUODENOSCOPY w/ bxs, COLONOSCOPY w/ bxs;  Surgeon: Phil Murphy MD;  Location: [facility]    Neurostimulator electrode      2 yrs ago    Other      frontal lobectomy    Other      facial surgery    Other      shoulder replacement    Other      cervical fusion    Other      frontal and temporal lobectomies for seizures    Other      bilat shoulder surgery    Other      Neurostimulator history.  This was removed but wires are still present.  Unable to get brain MRI    Other surgical history      cervical fusion    Other surgical history      shoulder replacement    Other  surgical history      complete jaw reconstruction     Spine surgery procedure unlisted      cervical fusion     Tonsillectomy      Total shoulder arthroplasty         Social History     Socioeconomic History    Marital status: Life Partner   Tobacco Use    Smoking status: Never     Passive exposure: Never    Smokeless tobacco: Never   Vaping Use    Vaping status: Never Used   Substance and Sexual Activity    Alcohol use: Not Currently     Comment: rare    Drug use: Yes     Types: Cannabis     Comment: nightly    Sexual activity: Yes     Partners: Female   Other Topics Concern    Caffeine Concern No    Exercise Yes    Seat Belt Yes   Social History Narrative    ** Merged History Encounter **          Social Determinants of Health     Food Insecurity: No Food Insecurity (6/5/2024)    Received from [facility]    Food Insecurity     Currently or in the past 3 months, have you worried your food would run out before you had money to buy more?: No     In the past 12 months, have you run out of food or been unable to get more?: No   Transportation Needs: No Transportation Needs (6/5/2024)    Received from [facility]    Transportation Needs     Medical Transportation Needs?: No    Received from [facility]    Housing Stability                   Physical Exam     ED Triage Vitals [08/05/24 1158]   /59   Pulse 77   Resp 18   Temp 98 °F (36.7 °C)   Temp src Temporal   SpO2 98 %   O2 Device None (Room air)       Physical Exam  Constitutional:       General: She is in acute distress.   HENT:      Head: Normocephalic and atraumatic.   Eyes:      Extraocular Movements: Extraocular movements intact.   Cardiovascular:      Rate and Rhythm: Normal rate.      Pulses: Normal pulses.   Pulmonary:      Effort: Pulmonary effort is normal. No respiratory distress.   Abdominal:      General: Abdomen is flat. There is no distension.      Tenderness: There is no abdominal  tenderness.   Musculoskeletal:         General: No swelling or deformity.      Cervical back: Normal range of motion and neck supple. No tenderness.      Comments: L   radial pulse 2+, cap refill all digits <2sec  sensation intact and equal all digits  thumb + index finger ring strength normal  abduction all digits strength normal  dorsiflexion wrist strength normal     Skin:     General: Skin is warm.   Neurological:      General: No focal deficit present.      Mental Status: She is alert.              ED Course     Labs Reviewed   BASIC METABOLIC PANEL (8) - Abnormal; Notable for the following components:       Result Value    Glucose 110 (*)     Potassium 3.4 (*)     Chloride 115 (*)     CO2 19.0 (*)     Calcium, Total 8.6 (*)     All other components within normal limits   CBC W/ DIFFERENTIAL - Abnormal; Notable for the following components:    HGB 11.1 (*)     All other components within normal limits   CBC WITH DIFFERENTIAL WITH PLATELET    Narrative:     The following orders were created for panel order CBC With Differential With Platelet.  Procedure                               Abnormality         Status                     ---------                               -----------         ------                     CBC W/ DIFFERENTIAL[[id number]]          Abnormal            Final result                 Please view results for these tests on the individual orders.   RAINBOW DRAW LAVENDER   RAINBOW DRAW LIGHT GREEN   RAINBOW DRAW BLUE   RAINBOW DRAW GOLD     CT BRAIN OR HEAD (CPT=70450)    Result Date: 8/5/2024  CONCLUSION:  Stable chronic changes.  No acute disease.    LOCATION:  [place] [id number]   Dictated by (CST): Evangelista Johnson MD on 8/05/2024 at 2:02 PM     Finalized by (CST): Evangelista Johnson MD on 8/05/2024 at 2:09 PM       XR ELBOW, COMPLETE (MIN 3 VIEWS), LEFT (CPT=73080)    Result Date: 8/5/2024  CONCLUSION:  No fracture or dislocation.   LOCATION:  [place]    Dictated by (CST): Cameron Daniels MD on  8/05/2024 at 1:55 PM     Finalized by (CST): Cameron Daniels MD on 8/05/2024 at 1:56 PM            MDM     Vitals:    08/05/24 1158 08/05/24 1230 08/05/24 1430   BP: 112/59 101/63 97/64   Pulse: 77 65 60   Resp: 18 14 14   Temp: 98 °F (36.7 °C)     TempSrc: Temporal     SpO2: 98% 98% 100%   Weight: 78.5 kg     Height: 170.2 cm (5' 7\")         Fall with head injury and persistent headache.  Patient feels like possible migraine exacerbation from the trauma.  Given patient is anticoagulation will need imaging to evaluate for intracranial traumatic injuries.  She is neurologically intact.  No other signs of trauma, patient requesting x-ray of the elbow from a fall couple weeks ago, she is ranging well    ED Course as of 08/05/24 1547  ------------------------------------------------------------  Time: 08/05 1339  Comment: Labs are notable for mild acidosis and hyperchloremia, appears chronic for patient  ------------------------------------------------------------  Time: 08/05 1339  Comment: My interpretation of elbow x-ray without fracture.     Patient states she always has some acidosis, she has not had any seizures.  She feels improved, she is requesting a dose of Imitrex prior to discharge  Discussed all findings.  Patient is feeling well to be discharged.  Vitals remain stable.  Ambulating without difficulty.  Given written and verbal instructions regarding this condition and strict return precautions.   Will follow up in clinic.   Patient verbalizes understanding of instructions and follow up plan, as well as indications to return to the emergency department.      Disposition and Plan     Clinical Impression:  1. Closed head injury, initial encounter    2. Migraine without status migrainosus, not intractable, unspecified migraine type        Disposition:  Discharge    Follow-up:  Nara Harkins MD  [address] [place] [address]  [address]  [facility] [address]  [phone]    Follow up        Medications Prescribed:  Discharge  Medication List as of 8/5/2024  3:24 PM

## 2024-08-15 ENCOUNTER — HOSPITAL ENCOUNTER (INPATIENT)
Facility: HOSPITAL | Age: 49
LOS: 2 days | Discharge: HOME | End: 2024-08-17
Attending: EMERGENCY MEDICINE | Admitting: INTERNAL MEDICINE
Payer: MEDICARE

## 2024-08-15 ENCOUNTER — APPOINTMENT (OUTPATIENT)
Dept: CT IMAGING | Facility: HOSPITAL | Age: 49
End: 2024-08-15
Payer: MEDICARE

## 2024-08-15 DIAGNOSIS — I62.9 INTRACRANIAL HEMORRHAGE (HCC): Primary | ICD-10-CM

## 2024-08-15 DIAGNOSIS — R55 SYNCOPE AND COLLAPSE: ICD-10-CM

## 2024-08-15 DIAGNOSIS — S06.5XAA SUBDURAL HEMATOMA (HCC): ICD-10-CM

## 2024-08-15 LAB
ALBUMIN SERPL-MCNC: 4.9 G/DL (ref 3.2–4.8)
ALBUMIN/GLOB SERPL: 1.6 {RATIO} (ref 1–2)
ALP LIVER SERPL-CCNC: 63 U/L
ALT SERPL-CCNC: <7 U/L
ANION GAP SERPL CALC-SCNC: 9 MMOL/L (ref 0–18)
AST SERPL-CCNC: 14 U/L (ref ?–34)
BASOPHILS # BLD AUTO: 0.01 X10(3) UL (ref 0–0.2)
BASOPHILS NFR BLD AUTO: 0.2 %
BILIRUB SERPL-MCNC: 0.2 MG/DL (ref 0.3–1.2)
BUN BLD-MCNC: 15 MG/DL (ref 9–23)
CALCIUM BLD-MCNC: 9.5 MG/DL (ref 8.7–10.4)
CHLORIDE SERPL-SCNC: 111 MMOL/L (ref 98–112)
CO2 SERPL-SCNC: 20 MMOL/L (ref 21–32)
CREAT BLD-MCNC: 1.07 MG/DL
EGFRCR SERPLBLD CKD-EPI 2021: 64 ML/MIN/1.73M2 (ref 60–?)
EOSINOPHIL # BLD AUTO: 0.09 X10(3) UL (ref 0–0.7)
EOSINOPHIL NFR BLD AUTO: 1.7 %
ERYTHROCYTE [DISTWIDTH] IN BLOOD BY AUTOMATED COUNT: 14.6 %
GLOBULIN PLAS-MCNC: 3 G/DL (ref 2–3.5)
GLUCOSE BLD-MCNC: 77 MG/DL (ref 70–99)
HCT VFR BLD AUTO: 39.2 %
HGB BLD-MCNC: 12.4 G/DL
IMM GRANULOCYTES # BLD AUTO: 0.01 X10(3) UL (ref 0–1)
IMM GRANULOCYTES NFR BLD: 0.2 %
INR BLD: 1.92 (ref 0.8–1.2)
LYMPHOCYTES # BLD AUTO: 1.55 X10(3) UL (ref 1–4)
LYMPHOCYTES NFR BLD AUTO: 29.5 %
MCH RBC QN AUTO: 26.6 PG (ref 26–34)
MCHC RBC AUTO-ENTMCNC: 31.6 G/DL (ref 31–37)
MCV RBC AUTO: 83.9 FL
MONOCYTES # BLD AUTO: 0.41 X10(3) UL (ref 0.1–1)
MONOCYTES NFR BLD AUTO: 7.8 %
NEUTROPHILS # BLD AUTO: 3.18 X10 (3) UL (ref 1.5–7.7)
NEUTROPHILS # BLD AUTO: 3.18 X10(3) UL (ref 1.5–7.7)
NEUTROPHILS NFR BLD AUTO: 60.6 %
OSMOLALITY SERPL CALC.SUM OF ELEC: 290 MOSM/KG (ref 275–295)
PLATELET # BLD AUTO: 273 10(3)UL (ref 150–450)
POTASSIUM SERPL-SCNC: 3.4 MMOL/L (ref 3.5–5.1)
PROT SERPL-MCNC: 7.9 G/DL (ref 5.7–8.2)
PROTHROMBIN TIME: 22.1 SECONDS (ref 11.6–14.8)
RBC # BLD AUTO: 4.67 X10(6)UL
SODIUM SERPL-SCNC: 140 MMOL/L (ref 136–145)
TROPONIN I SERPL HS-MCNC: <3 NG/L
WBC # BLD AUTO: 5.3 X10(3) UL (ref 4–11)

## 2024-08-15 PROCEDURE — 99291 CRITICAL CARE FIRST HOUR: CPT | Performed by: NURSE PRACTITIONER

## 2024-08-15 PROCEDURE — 30283B1 TRANSFUSION OF NONAUTOLOGOUS 4-FACTOR PROTHROMBIN COMPLEX CONCENTRATE INTO VEIN, PERCUTANEOUS APPROACH: ICD-10-PCS | Performed by: INTERNAL MEDICINE

## 2024-08-15 PROCEDURE — 70450 CT HEAD/BRAIN W/O DYE: CPT

## 2024-08-15 RX ORDER — MORPHINE SULFATE 2 MG/ML
2 INJECTION, SOLUTION INTRAMUSCULAR; INTRAVENOUS EVERY 2 HOUR PRN
Status: DISCONTINUED | OUTPATIENT
Start: 2024-08-15 | End: 2024-08-17

## 2024-08-15 RX ORDER — ONDANSETRON 2 MG/ML
4 INJECTION INTRAMUSCULAR; INTRAVENOUS EVERY 6 HOURS PRN
Status: DISCONTINUED | OUTPATIENT
Start: 2024-08-15 | End: 2024-08-17

## 2024-08-15 RX ORDER — PROCHLORPERAZINE EDISYLATE 5 MG/ML
5 INJECTION INTRAMUSCULAR; INTRAVENOUS EVERY 8 HOURS PRN
Status: DISCONTINUED | OUTPATIENT
Start: 2024-08-15 | End: 2024-08-17

## 2024-08-15 RX ORDER — LAMOTRIGINE 100 MG/1
200 TABLET ORAL 2 TIMES DAILY
Status: DISCONTINUED | OUTPATIENT
Start: 2024-08-15 | End: 2024-08-17

## 2024-08-15 RX ORDER — LABETALOL HYDROCHLORIDE 5 MG/ML
10 INJECTION, SOLUTION INTRAVENOUS EVERY 10 MIN PRN
Status: DISCONTINUED | OUTPATIENT
Start: 2024-08-15 | End: 2024-08-17

## 2024-08-15 RX ORDER — ONDANSETRON 2 MG/ML
4 INJECTION INTRAMUSCULAR; INTRAVENOUS EVERY 6 HOURS PRN
Status: DISCONTINUED | OUTPATIENT
Start: 2024-08-15 | End: 2024-08-15

## 2024-08-15 RX ORDER — ALPRAZOLAM 1 MG
1 TABLET ORAL DAILY
Status: DISCONTINUED | OUTPATIENT
Start: 2024-08-16 | End: 2024-08-15

## 2024-08-15 RX ORDER — QUETIAPINE FUMARATE 50 MG/1
200 TABLET, FILM COATED ORAL NIGHTLY
Status: DISCONTINUED | OUTPATIENT
Start: 2024-08-15 | End: 2024-08-15

## 2024-08-15 RX ORDER — ACETAMINOPHEN 325 MG/1
650 TABLET ORAL EVERY 4 HOURS PRN
Status: DISCONTINUED | OUTPATIENT
Start: 2024-08-15 | End: 2024-08-17

## 2024-08-15 RX ORDER — ACETAMINOPHEN 325 MG/1
650 TABLET ORAL EVERY 6 HOURS PRN
Status: DISCONTINUED | OUTPATIENT
Start: 2024-08-15 | End: 2024-08-16

## 2024-08-15 RX ORDER — LURASIDONE HYDROCHLORIDE 20 MG/1
20 TABLET, FILM COATED ORAL
Status: DISCONTINUED | OUTPATIENT
Start: 2024-08-16 | End: 2024-08-16

## 2024-08-15 RX ORDER — HYDROMORPHONE HYDROCHLORIDE 1 MG/ML
0.5 INJECTION, SOLUTION INTRAMUSCULAR; INTRAVENOUS; SUBCUTANEOUS EVERY 30 MIN PRN
Status: DISCONTINUED | OUTPATIENT
Start: 2024-08-15 | End: 2024-08-15

## 2024-08-15 RX ORDER — LORAZEPAM 1 MG/1
1 TABLET ORAL EVERY 4 HOURS PRN
Status: DISCONTINUED | OUTPATIENT
Start: 2024-08-15 | End: 2024-08-17

## 2024-08-15 RX ORDER — HYDRALAZINE HYDROCHLORIDE 20 MG/ML
10 INJECTION INTRAMUSCULAR; INTRAVENOUS EVERY 2 HOUR PRN
Status: DISCONTINUED | OUTPATIENT
Start: 2024-08-15 | End: 2024-08-17

## 2024-08-15 RX ORDER — TOPIRAMATE 100 MG/1
200 TABLET, FILM COATED ORAL 2 TIMES DAILY
Status: DISCONTINUED | OUTPATIENT
Start: 2024-08-15 | End: 2024-08-17

## 2024-08-15 RX ORDER — MORPHINE SULFATE 2 MG/ML
1 INJECTION, SOLUTION INTRAMUSCULAR; INTRAVENOUS EVERY 2 HOUR PRN
Status: DISCONTINUED | OUTPATIENT
Start: 2024-08-15 | End: 2024-08-17

## 2024-08-15 RX ORDER — ACETAMINOPHEN 650 MG/1
650 SUPPOSITORY RECTAL EVERY 4 HOURS PRN
Status: DISCONTINUED | OUTPATIENT
Start: 2024-08-15 | End: 2024-08-17

## 2024-08-15 RX ORDER — SODIUM CHLORIDE 9 MG/ML
INJECTION, SOLUTION INTRAVENOUS CONTINUOUS
Status: DISCONTINUED | OUTPATIENT
Start: 2024-08-15 | End: 2024-08-17

## 2024-08-15 RX ORDER — LACOSAMIDE 50 MG/1
50 TABLET ORAL DAILY
Status: DISCONTINUED | OUTPATIENT
Start: 2024-08-16 | End: 2024-08-16

## 2024-08-15 RX ORDER — MORPHINE SULFATE 2 MG/ML
0.5 INJECTION, SOLUTION INTRAMUSCULAR; INTRAVENOUS EVERY 2 HOUR PRN
Status: DISCONTINUED | OUTPATIENT
Start: 2024-08-15 | End: 2024-08-17

## 2024-08-15 RX ORDER — LACOSAMIDE 100 MG/1
100 TABLET ORAL 2 TIMES DAILY
Status: DISCONTINUED | OUTPATIENT
Start: 2024-08-15 | End: 2024-08-15

## 2024-08-15 RX ORDER — TRAZODONE HYDROCHLORIDE 50 MG/1
100 TABLET, FILM COATED ORAL NIGHTLY
Status: DISCONTINUED | OUTPATIENT
Start: 2024-08-15 | End: 2024-08-15

## 2024-08-15 RX ORDER — SERTRALINE HYDROCHLORIDE 100 MG/1
100 TABLET, FILM COATED ORAL DAILY
Status: DISCONTINUED | OUTPATIENT
Start: 2024-08-16 | End: 2024-08-15

## 2024-08-15 RX ORDER — PHENYLEPHRINE HCL IN 0.9% NACL 50MG/250ML
PLASTIC BAG, INJECTION (ML) INTRAVENOUS CONTINUOUS PRN
Status: DISCONTINUED | OUTPATIENT
Start: 2024-08-15 | End: 2024-08-17

## 2024-08-15 RX ORDER — LACOSAMIDE 100 MG/1
50 TABLET ORAL DAILY
Status: DISCONTINUED | OUTPATIENT
Start: 2024-08-16 | End: 2024-08-15

## 2024-08-15 NOTE — ED INITIAL ASSESSMENT (HPI)
Pt reports frequent falls recently, last fall on Saturday, pt fell backward and hit the back of her head, +LOC, pt on thinners; pt has had medication changes recently from seroquel to trazodone and thinks that may be the cause of her falls, pt currently c/o HA, nausea without vomiting, denies dizziness/blurred vision, pt took toradol at 1000 this AM

## 2024-08-16 ENCOUNTER — APPOINTMENT (OUTPATIENT)
Dept: CT IMAGING | Facility: HOSPITAL | Age: 49
End: 2024-08-16
Payer: MEDICARE

## 2024-08-16 ENCOUNTER — APPOINTMENT (OUTPATIENT)
Dept: ULTRASOUND IMAGING | Facility: HOSPITAL | Age: 49
End: 2024-08-16
Attending: HOSPITALIST
Payer: MEDICARE

## 2024-08-16 PROBLEM — G44.311 INTRACTABLE ACUTE POST-TRAUMATIC HEADACHE: Status: ACTIVE | Noted: 2024-08-16

## 2024-08-16 PROBLEM — S06.5XAA SUBDURAL HEMATOMA (HCC): Status: ACTIVE | Noted: 2024-08-16

## 2024-08-16 LAB
ANION GAP SERPL CALC-SCNC: 8 MMOL/L (ref 0–18)
APTT PPP: 28.7 SECONDS (ref 23–36)
ATRIAL RATE: 64 BPM
ATRIAL RATE: 65 BPM
BASOPHILS # BLD AUTO: 0.02 X10(3) UL (ref 0–0.2)
BASOPHILS NFR BLD AUTO: 0.4 %
BUN BLD-MCNC: 16 MG/DL (ref 9–23)
CALCIUM BLD-MCNC: 8.3 MG/DL (ref 8.7–10.4)
CHLORIDE SERPL-SCNC: 115 MMOL/L (ref 98–112)
CO2 SERPL-SCNC: 18 MMOL/L (ref 21–32)
CREAT BLD-MCNC: 0.8 MG/DL
EGFRCR SERPLBLD CKD-EPI 2021: 91 ML/MIN/1.73M2 (ref 60–?)
EOSINOPHIL # BLD AUTO: 0.14 X10(3) UL (ref 0–0.7)
EOSINOPHIL NFR BLD AUTO: 2.6 %
ERYTHROCYTE [DISTWIDTH] IN BLOOD BY AUTOMATED COUNT: 14.6 %
GLUCOSE BLD-MCNC: 74 MG/DL (ref 70–99)
GLUCOSE BLD-MCNC: 79 MG/DL (ref 70–99)
GLUCOSE BLD-MCNC: 80 MG/DL (ref 70–99)
GLUCOSE BLD-MCNC: 89 MG/DL (ref 70–99)
HCT VFR BLD AUTO: 31.7 %
HGB BLD-MCNC: 9.9 G/DL
IMM GRANULOCYTES # BLD AUTO: 0.01 X10(3) UL (ref 0–1)
IMM GRANULOCYTES NFR BLD: 0.2 %
INR BLD: 1.23 (ref 0.8–1.2)
LYMPHOCYTES # BLD AUTO: 1.99 X10(3) UL (ref 1–4)
LYMPHOCYTES NFR BLD AUTO: 36.4 %
MAGNESIUM SERPL-MCNC: 2 MG/DL (ref 1.6–2.6)
MCH RBC QN AUTO: 26.6 PG (ref 26–34)
MCHC RBC AUTO-ENTMCNC: 31.2 G/DL (ref 31–37)
MCV RBC AUTO: 85.2 FL
MONOCYTES # BLD AUTO: 0.46 X10(3) UL (ref 0.1–1)
MONOCYTES NFR BLD AUTO: 8.4 %
NEUTROPHILS # BLD AUTO: 2.84 X10 (3) UL (ref 1.5–7.7)
NEUTROPHILS # BLD AUTO: 2.84 X10(3) UL (ref 1.5–7.7)
NEUTROPHILS NFR BLD AUTO: 52 %
OSMOLALITY SERPL CALC.SUM OF ELEC: 292 MOSM/KG (ref 275–295)
P AXIS: 39 DEGREES
P AXIS: 48 DEGREES
P-R INTERVAL: 156 MS
P-R INTERVAL: 162 MS
PLATELET # BLD AUTO: 217 10(3)UL (ref 150–450)
POTASSIUM SERPL-SCNC: 3.2 MMOL/L (ref 3.5–5.1)
POTASSIUM SERPL-SCNC: 4.3 MMOL/L (ref 3.5–5.1)
PROTHROMBIN TIME: 15.6 SECONDS (ref 11.6–14.8)
Q-T INTERVAL: 388 MS
Q-T INTERVAL: 416 MS
QRS DURATION: 82 MS
QRS DURATION: 82 MS
QTC CALCULATION (BEZET): 400 MS
QTC CALCULATION (BEZET): 432 MS
R AXIS: 16 DEGREES
R AXIS: 17 DEGREES
RBC # BLD AUTO: 3.72 X10(6)UL
SODIUM SERPL-SCNC: 141 MMOL/L (ref 136–145)
T AXIS: 0 DEGREES
T AXIS: 24 DEGREES
VENTRICULAR RATE: 64 BPM
VENTRICULAR RATE: 65 BPM
WBC # BLD AUTO: 5.5 X10(3) UL (ref 4–11)

## 2024-08-16 PROCEDURE — 99221 1ST HOSP IP/OBS SF/LOW 40: CPT | Performed by: NEUROLOGICAL SURGERY

## 2024-08-16 PROCEDURE — 93970 EXTREMITY STUDY: CPT | Performed by: HOSPITALIST

## 2024-08-16 PROCEDURE — 99291 CRITICAL CARE FIRST HOUR: CPT | Performed by: INTERNAL MEDICINE

## 2024-08-16 PROCEDURE — 70450 CT HEAD/BRAIN W/O DYE: CPT | Performed by: NURSE PRACTITIONER

## 2024-08-16 RX ORDER — ACETAMINOPHEN 500 MG
1000 TABLET ORAL EVERY 6 HOURS PRN
Status: DISCONTINUED | OUTPATIENT
Start: 2024-08-16 | End: 2024-08-17

## 2024-08-16 RX ORDER — ENOXAPARIN SODIUM 100 MG/ML
40 INJECTION SUBCUTANEOUS DAILY
Status: DISCONTINUED | OUTPATIENT
Start: 2024-08-16 | End: 2024-08-17

## 2024-08-16 RX ORDER — LACOSAMIDE 50 MG/1
50 TABLET ORAL NIGHTLY
Status: DISCONTINUED | OUTPATIENT
Start: 2024-08-16 | End: 2024-08-17

## 2024-08-16 RX ORDER — POTASSIUM CHLORIDE 1500 MG/1
40 TABLET, EXTENDED RELEASE ORAL EVERY 4 HOURS
Status: COMPLETED | OUTPATIENT
Start: 2024-08-16 | End: 2024-08-16

## 2024-08-16 NOTE — PLAN OF CARE
Received pt. From ER at 2030. Hourly neuro exams as charted in epic. Repeat CT scan done 0030. Seizure precautions. Medicated with morphine for c/o headache. Instructed by APN not to put on SCDs at this time as pt. Has history of DVT to right leg, on xarelto prior to admission.

## 2024-08-16 NOTE — H&P
[facility] Hospitalist H&P       CC:   Chief Complaint   Patient presents with    Fall        PCP: Nara Harkins MD    History of Present Illness:    Patient is a 49-year-old female with significant past medical history of seizure disorder, anxiety, epilepsy, depression recent right posterior tibial DVT, history of suicide attempt with an overdose of trazodone pills, history of multiple falls came in with a fall and subdural hematoma.  Patient has had about 6 falls in the last month, states that she had gotten up last night to go into the kitchen and and suddenly fell and hit the back of her head.  She has no prior symptoms of lightheadedness dizziness palpitations shortness of breath prior to the sudden fall,   Her psychiatrist and working on figuring out the cause of this fall, she was switched out of Seroquel for orthostatic hypotension and placed on trazodone.  She also denies any bilateral lower extremity swelling shortness of breath.  In the emergency room, patient's vital signs been stable, blood pressures in the 1 teens over 70s afebrile      She had an initial CT head that showed a small acute subdural hematoma layering along the right tentorium with a maximal thickness of 2 mm no evidence of any mass effect or herniation then she had a repeat CT head at midnight last night, that was stable and actually did not show any sign of bleed    Labs are remarkable for hypokalemia with potassium 3.4 creatinine was 1.07, INR was 1.92 hemoglobin was 12.4 repeat hemoglobin this morning was 9.9 no leukocytosis    Patient was admitted to ICU and neurosurgery as well as neurocritical care were consulted given the fact that she was on Xarelto she was also given Kcentra    [facility]  Past Medical History:    Anxiety state    Back problem    Chronic pain    CKD (chronic kidney disease) stage 3, GFR 30-59 ml/min ([facility])    Creatinine 1.23 and GFR 53 ED visit 8/28/2020    Depression    DVT (deep venous thrombosis) ([facility])    Epilepsy ([facility])     Migraines    botox , and nerve blocks     Ovarian cyst    left 3.2cm  seen on CT in ER    Pneumonia due to COVID-19 virus    8/20    Pneumonia due to organism    covid 11/2020 , had to hospitalized 4 days     Pseudoseizure    Seizure disorder (HCC)    last seizure 2 weeks ago        PSH  Past Surgical History:   Procedure Laterality Date    Appendectomy      Appendectomy      Brain surgery      neurostim implanted./ removed     Egd N/A 09/23/2021    Procedure: ESOPHAGOGASTRODUODENOSCOPY w/ bxs, COLONOSCOPY w/ bxs;  Surgeon: Phil Murphy MD;  Location: [facility] [place]    Neurostimulator electrode      2 yrs ago    Other      frontal lobectomy    Other      facial surgery    Other      shoulder replacement    Other      cervical fusion    Other      frontal and temporal lobectomies for seizures    Other      bilat shoulder surgery    Other      Neurostimulator history.  This was removed but wires are still present.  Unable to get brain MRI    Other surgical history      cervical fusion    Other surgical history      shoulder replacement    Other surgical history      complete jaw reconstruction     Spine surgery procedure unlisted      cervical fusion     Tonsillectomy      Total shoulder arthroplasty          ALL:  Allergies   Allergen Reactions    Avocado OTHER (SEE COMMENTS)     Per PATIENT, SWOLLEN FACE    Flagyl [Metronidazole] OTHER (SEE COMMENTS)     Convulsions    Shellfish-Derived Products SWELLING     facial        Home Medications:  Outpatient Medications Marked as Taking for the 8/15/24 encounter (Hospital Encounter)   Medication Sig Dispense Refill    rivaroxaban 15 & 20 MG Oral Tablet Therapy Pack Take As Directed based on package instructions: Days 1-21: 15 mg by mouth twice daily Days 22-30: 20 mg by mouth once daily 1 each 0    traZODone 100 MG Oral Tab Take 0.5 tablets (50 mg total) by mouth nightly.      SUMAtriptan Succinate 100 MG Oral Tab Take 1 tablet (100 mg total) by mouth 2 (two)  times daily as needed for Migraine.      sertraline 100 MG Oral Tab Take 1 tablet (100 mg total) by mouth daily. Take 1 and a half tablets daily for 150mg dose total      lamoTRIgine 200 MG Oral Tab Take 1 tablet (200 mg total) by mouth 2 (two) times daily.      topiramate 200 MG Oral Tab Take 1 tablet (200 mg total) by mouth 2 (two) times daily.           Soc Hx  Social History     Tobacco Use    Smoking status: Never     Passive exposure: Never    Smokeless tobacco: Never   Substance Use Topics    Alcohol use: Not Currently     Comment: rare        Fam Hx  Family History   Problem Relation Age of Onset    Hypertension Father     Other (hiv) Father     Other (Other) Father         HIV    Other (Kidney) Father         Failing Kidneys    Alcohol and Other Disorders Associated Mother     Depression Mother     Other (Other) Mother         MRSA    No Known Problems Maternal Grandmother     No Known Problems Maternal Grandfather     No Known Problems Paternal Grandmother     No Known Problems Paternal Grandfather     Hypertension Brother        Review of Systems  General: Denies unintentional weight loss, fevers, or chills negative except for above-  HEENT: Denies vision loss or double vision, denies hearing loss  Cardiovascular: Denies chest pain, palpitations, peripheral edema  Pulmonary: Denies cough, shortness of breath, or wheezing  Gastrointestinal: Denies abdominal pain, melena, or hematochezia  Genitourinary: Denies urinary frequency, urgency, and dysuria  Neurologic: Denies numbness, headaches, focal weakness  Skin: Denies rashes, sores  Endocrine: Denies heat or cold intolerance, denies polydipsia  Hematologic: Denies abnormal bleeding or bruising     OBJECTIVE:  /66 (BP Location: Right arm)   Pulse 62   Temp 99.3 °F (37.4 °C) (Temporal)   Resp 20   Ht 5' 7\" (1.702 m)   Wt 178 lb 5.6 oz (80.9 kg)   LMP 08/01/2024 (Exact Date)   SpO2 96%   BMI 27.93 kg/m²     BP Readings from Last 3 Encounters:    08/16/24 108/66   08/05/24 97/64   07/15/24 117/90     Wt Readings from Last 3 Encounters:   08/15/24 178 lb 5.6 oz (80.9 kg)   08/05/24 173 lb (78.5 kg)   07/15/24 170 lb (77.1 kg)       Wt Readings from Last 6 Encounters:   08/15/24 178 lb 5.6 oz (80.9 kg)   08/05/24 173 lb (78.5 kg)   07/15/24 170 lb (77.1 kg)   07/03/24 175 lb (79.4 kg)   07/03/24 175 lb (79.4 kg)   06/28/24 175 lb (79.4 kg)     Gen: No acute distress, alert and oriented x 3  Pulm: Lungs clear bilaterally, good inspiratory effort   CV:  nL S1/S2  Abd: soft, NT/ND, no hepatomegaly, +BS  MSK: moving all extremities, no edema  Neuro: no focal deficits  Skin: no rashes/lesions  Psych: normal mood/affect          Diagnostic Data:    CBC/Chem  Recent Labs   Lab 08/15/24  1824 08/16/24  0451   WBC 5.3 5.5   HGB 12.4 9.9*   MCV 83.9 85.2   .0 217.0   INR 1.92* 1.23*       Recent Labs   Lab 08/15/24  1824 08/16/24  0451    141   K 3.4* 3.2*    115*   CO2 20.0* 18.0*   BUN 15 16   CREATSERUM 1.07* 0.80   GLU 77 80   CA 9.5 8.3*   MG  --  2.0       Recent Labs   Lab 08/15/24  1824   ALT <7*   AST 14   ALB 4.9*       No results for input(s): \"TROP\" in the last 168 hours.        Radiology: CT BRAIN OR HEAD (CPT=70450)    Result Date: 8/16/2024  PROCEDURE:  CT BRAIN OR HEAD (45445)  COMPARISON:  [facility] , CT, CT BRAIN OR HEAD ([id number]), 8/15/2024, 5:37 PM.  INDICATIONS:  f/u SDH post fall on Eliquis  TECHNIQUE:  Noncontrast CT scanning is performed through the brain. Dose reduction techniques were used. Dose information is transmitted to the ACR (American College of Radiology) NRDR (National Radiology Data Registry) which includes the Dose Index Registry.  PATIENT STATED HISTORY: (As transcribed by Technologist)  Subdural hematoma. Patient on Eliquis.    FINDINGS:  VENTRICLES/SULCI:  Ventricles and sulci are normal in size.  INTRACRANIAL:  There are no abnormal extraaxial fluid collections.  There is no midline shift.  There are no  intraparenchymal brain abnormalities.  There is nothing specific for acute infarct.  There is no hemorrhage or mass lesion.  SINUSES:           No sign of acute sinusitis.  MASTOIDS:          No sign of acute inflammation. SKULL:             Postsurgical changes involving the calvarium redemonstrated. OTHER:             None.            CONCLUSION:  Stable CT appearance when compared to prior examination from the previous day.  No sign of bleed.    LOCATION:  [facility]   Dictated by (CST): Ravinder Zhao MD on 8/16/2024 at 6:33 AM     Finalized by (CST): Ravinder Zhao MD on 8/16/2024 at 6:34 AM       CT BRAIN OR HEAD (CPT=70450)    Result Date: 8/15/2024  PROCEDURE:  CT BRAIN OR HEAD (50013)  COMPARISON:  [facility] , CT, CT BRAIN OR HEAD ([id number]), 8/05/2024, 1:38 PM.  INDICATIONS:  PT fell Saturday, LOC, on blood thinners, c/o headache  TECHNIQUE:  Noncontrast CT scanning is performed through the brain. Dose reduction techniques were used. Dose information is transmitted to the ACR (American College of Radiology) NRDR (National Radiology Data Registry) which includes the Dose Index Registry.  PATIENT STATED HISTORY: (As transcribed by Technologist)  Patient fell Saturday with LOC and on blood thinners. Now complains of headache.    FINDINGS:  Small acute subdural hematoma layers along the right tentorium, maximal thickness of 2 mm (series 6, image 50).  No regional mass effect, herniation, or hydrocephalus.  Diffuse sulcal prominence concordant with age.  No CT evidence of acute large vessel transcortical infarct.  Dense dural calcifications noted along the superior convexity.  Paranasal sinuses and mastoid air cells are clear.  Bifrontal craniotomy.  No acute calvarial injury.  No organized scalp hematoma.             CONCLUSION:   Small acute subdural hematoma layering along the right tentorium with maximal thickness of 2 mm.  No regional mass effect, herniation, or hydrocephalus.   Findings discussed with   Christina at 5:55 p.m. Central standard time on 08/15/2024.    LOCATION:  [facility]   Dictated by (CST): Johnny Wetzel MD on 8/15/2024 at 5:52 PM     Finalized by (CST): Johnny Wetzel MD on 8/15/2024 at 5:56 PM       CT BRAIN OR HEAD (CPT=70450)    Result Date: 8/5/2024  PROCEDURE:  CT BRAIN OR HEAD ([id number])  COMPARISON:  [facility] , CT, CT BRAIN OR HEAD ([id number]), 5/03/2024, 9:21 AM.  INDICATIONS:  woke up on floor , hit head, head pain, on xarelto, fentanyl 80 mcg given by ems pta  TECHNIQUE:  Noncontrast CT scanning is performed through the brain. Dose reduction techniques were used. Dose information is transmitted to the ACR (American College of Radiology) NRDR (National Radiology Data Registry) which includes the Dose Index Registry.  PATIENT STATED HISTORY: (As transcribed by Technologist)  Patient fell, doesn't remember which side she fell on    FINDINGS:  VENTRICLES/SULCI:  There is no evidence of hydrocephalus. INTRACRANIAL:  There are stable postsurgical changes with encephalomalacia and gliosis involving the anterior lateral left temporal lobe subjacent to the craniectomy defect.  There is no acute hemorrhage.  There is no mass.  There is no midline shift.  The basal cisterns are patent. SINUSES:           No sign of acute sinusitis.  MASTOIDS:          No sign of acute inflammation. SKULL:             Multiple bilateral craniotomy changes noted.  No acute fracture. OTHER:             A cervical neurostimulator is again noted posterior to the upper cervical spine.            CONCLUSION:  Stable chronic changes.  No acute disease.    LOCATION:  [id number]   Dictated by (CST): Evangelista Johnson MD on 8/05/2024 at 2:02 PM     Finalized by (CST): Evangelista Johnson MD on 8/05/2024 at 2:09 PM       XR ELBOW, COMPLETE (MIN 3 VIEWS), LEFT (CPT=73080)    Result Date: 8/5/2024  PROCEDURE:  XR ELBOW, COMPLETE (MIN 3 VIEWS), LEFT (CPT=73080)  TECHNIQUE:  Three views were obtained.  COMPARISON:  None.  INDICATIONS:  woke up on  floor , hit head, head pain, on xarelto, fentanyl 80 mcg given by ems pta  PATIENT STATED HISTORY: (As transcribed by Technologist)  Patient stated having left medial elbow pain after falling a couple days ago.    FINDINGS:  BONES:  No fracture, subluxation or dislocation. SOFT TISSUES:  Negative.  No visible soft tissue swelling. EFFUSION:  None visible. OTHER:  Negative.            CONCLUSION:  No fracture or dislocation.   LOCATION:  [facility]    Dictated by (CST): Cameron Daniels MD on 8/05/2024 at 1:55 PM     Finalized by (CST): Cameron Daniels MD on 8/05/2024 at 1:56 PM              ASSESSMENT / PLAN:         Patient is a 49-year-old female with significant past medical history of seizure disorder, anxiety, epilepsy, depression recent right posterior tibial DVT    # Fall causing subdural hematoma  # Multiple falls recently  -CT head repeated appears to be stable  -No surgical management at this time, continue to hold antiplatelet or anticoagulation therapy for 5 days, patient can safely resume Xarelto on 8/21 per neurosurgery  -Okay to discharge without any repeat imaging needed at this time per neurosurgery  -For falls, check orthostats, also consulted psych as her psych medications were adjusted and possible reason for her multiple falls    # Epilepsy  # History of seizures  -Resume home antiepileptics at this time, no further workup per neurocritical care    # Acute right posterior DVT  -Patient has been on Xarelto however Xarelto on hold secondary to above  -Checking venous Dopplers bilaterally  -Okay to resume Xarelto in 5 days  -Hematology consulted to further manage as patient does have a history of falls    Prophy:  DVT: SCDs/Lovenox  Deconditioning prevention: PT OT    Dispo: [facility] with [facility]    Outpatient records reviewed confirming patient's medical history and medications.     Further recommendations pending patient's clinical course.  [facility] hospitalist to continue to follow patient  while in house    Time spent: greater than 95 minutes spent in d/w pt/family, coordination of care, and d/w staff.   Kinga hill MD   Internal Medicine  [facility] Hospitalist  Pager: [phone]      **Certification      PHYSICIAN Certification of Need for Inpatient Hospitalization - Initial Certification    Patient will require inpatient services that will reasonably be expected to span two midnight's based on the clinical documentation in H+P.   Based on patients current state of illness, I anticipate that, after discharge, patient will require TBD.

## 2024-08-16 NOTE — CM/SW NOTE
08/16/24 1600   CM/SW Referral Data   Referral Source    Reason for Referral Discharge planning     HOME SITUATION  Type of Home: House   Home Layout: One level     Lives With: Roommate  Drives: Yes  Patient Owned Equipment: None  Patient Regularly Uses: Glasses     Prior Level of [place]: Pt states that she lives in a house, with a roommate. Pt is IND with all ADLs and Mobility.    NN needs identified at this time. RN to contact CM if needs arise.    Maria Victoria Galdamez, ARLET RN,   X [phone]

## 2024-08-16 NOTE — PROGRESS NOTES
08/16/24 1611 08/16/24 1612 08/16/24 1613   Vitals   Pulse 63 77 86   Resp 13 20 23   /62 96/66 110/59   MAP (mmHg) 77 77 73   BP Location Right arm Right arm Right arm   BP Method Automatic Automatic Automatic   Patient Position Lying Sitting Standing   Cardiac Rhythm NSR NSR NSR

## 2024-08-16 NOTE — CONSULTS
[facility]  Neurocritical Care Consult Note    Azeb Millan Patient Status:  Inpatient    1975 MRN [id number]   Location [facility] 6NE-A Attending Kinga Sevilla MD   Hosp Day # 1 PCP Nara Harkins MD       Reason for Consultation:   sdh    HPI:   Patient is a 49 year old female with a h/o ckd, dvt on doac, depression/anxiety do, seizures/pseudoseizures p/w small sdh 8/15. Pt reports inc frequency of falls since starting trazadone, including last week fall with head trauma, thus came to ED where w/u revealed ct head with 2mm R tentorial sdh, thus given kcentra for oac reversal and pt was transferred to [facility] for further monitoring.     Past Medical History:    Anxiety state    Back problem    Chronic pain    CKD (chronic kidney disease) stage 3, GFR 30-59 ml/min ([facility])    Creatinine 1.23 and GFR 53 ED visit 2020    Depression    DVT (deep venous thrombosis) ([facility])    Epilepsy ([facility])    Migraines    botox , and nerve blocks     Ovarian cyst    left 3.2cm  seen on CT in ER    Pneumonia due to COVID-19 virus        Pneumonia due to organism    covid 2020 , had to hospitalized 4 days     Pseudoseizure    Seizure disorder (HCC)    last seizure 2 weeks ago       Past Surgical History:   Procedure Laterality Date    Appendectomy      Appendectomy      Brain surgery      neurostim implanted./ removed     Egd N/A 2021    Procedure: ESOPHAGOGASTRODUODENOSCOPY w/ bxs, COLONOSCOPY w/ bxs;  Surgeon: Phil Murphy MD;  Location: [facility] [place]    Neurostimulator electrode      2 yrs ago    Other      frontal lobectomy    Other      facial surgery    Other      shoulder replacement    Other      cervical fusion    Other      frontal and temporal lobectomies for seizures    Other      bilat shoulder surgery    Other      Neurostimulator history.  This was removed but wires are still present.  Unable to get brain MRI    Other surgical history      cervical fusion     Other surgical history      shoulder replacement    Other surgical history      complete jaw reconstruction     Spine surgery procedure unlisted      cervical fusion     Tonsillectomy      Total shoulder arthroplasty         Medications Prior to Admission   Medication Sig Dispense Refill Last Dose    rivaroxaban 15 & 20 MG Oral Tablet Therapy Pack Take As Directed based on package instructions: Days 1-21: 15 mg by mouth twice daily Days 22-30: 20 mg by mouth once daily 1 each 0 8/15/2024 at 1200    traZODone 100 MG Oral Tab Take 0.5 tablets (50 mg total) by mouth nightly.   2024    SUMAtriptan Succinate 100 MG Oral Tab Take 1 tablet (100 mg total) by mouth 2 (two) times daily as needed for Migraine.   8/15/2024 at 1200    sertraline 100 MG Oral Tab Take 1 tablet (100 mg total) by mouth daily. Take 1 and a half tablets daily for 150mg dose total   8/15/2024 at 1200    lamoTRIgine 200 MG Oral Tab Take 1 tablet (200 mg total) by mouth 2 (two) times daily.   2024    topiramate 200 MG Oral Tab Take 1 tablet (200 mg total) by mouth 2 (two) times daily.   8/15/2024 at 1200    [] mupirocin 2 % External Ointment Apply 1 Application topically 3 (three) times daily for 14 days. 1 each 0     [] levoFLOXacin 750 MG Oral Tab Take 1 tablet (750 mg total) by mouth daily for 7 days. 7 tablet 0     [] mupirocin 2 % External Ointment Apply 1 Application topically 3 (three) times daily for 14 days. 1 each 0     [] famotidine (PEPCID) 20 MG Oral Tab Take 1 tablet (20 mg total) by mouth 2 (two) times daily as needed for Heartburn. 30 tablet 0     [] dicyclomine 20 MG Oral Tab Take 1 tablet (20 mg total) by mouth 4 (four) times daily as needed. 30 tablet 0     lacosamide (VIMPAT) 50 MG Oral Tab Take 1 tablet (50 mg total) by mouth daily. 30 tablet 0     zolpidem 10 MG Oral Tab Take 1 tablet (10 mg total) by mouth nightly as needed for Sleep.       ALPRAZolam ER 1 MG Oral Tablet 24 Hr Take 1  tablet (1 mg total) by mouth every morning.       LORazepam 1 MG Oral Tab Take 1 tablet (1 mg total) by mouth every 4 (four) hours as needed for Anxiety.       lacosamide 100 MG Oral Tab Take 1 tablet (100 mg total) by mouth 2 (two) times daily.       QUEtiapine 200 MG Oral Tab Take 1 tablet (200 mg total) by mouth nightly. 30 tablet 0      Allergies   Allergen Reactions    Avocado OTHER (SEE COMMENTS)     Per PATIENT, SWOLLEN FACE    Flagyl [Metronidazole] OTHER (SEE COMMENTS)     Convulsions    Shellfish-Derived Products SWELLING     facial     Social History     Socioeconomic History    Marital status: Life Partner   Tobacco Use    Smoking status: Never     Passive exposure: Never    Smokeless tobacco: Never   Vaping Use    Vaping status: Never Used   Substance and Sexual Activity    Alcohol use: Not Currently     Comment: rare    Drug use: Yes     Types: Cannabis     Comment: nightly    Sexual activity: Yes     Partners: Female   Other Topics Concern    Caffeine Concern No    Exercise Yes    Seat Belt Yes     Family History   Problem Relation Age of Onset    Hypertension Father     Other (hiv) Father     Other (Other) Father         HIV    Other (Kidney) Father         Failing Kidneys    Alcohol and Other Disorders Associated Mother     Depression Mother     Other (Other) Mother         MRSA    No Known Problems Maternal Grandmother     No Known Problems Maternal Grandfather     No Known Problems Paternal Grandmother     No Known Problems Paternal Grandfather     Hypertension Brother        Current Meds:  Current Facility-Administered Medications   Medication Dose Route Frequency    LORazepam (Ativan) tab 1 mg  1 mg Oral Q4H PRN    lamoTRIgine (LaMICtal) tab 200 mg  200 mg Oral BID    lurasidone (Latuda) tab 20 mg  20 mg Oral Daily with breakfast    topiramate (TopaMAX) tab 200 mg  200 mg Oral BID    acetaminophen (Tylenol) tab 650 mg  650 mg Oral Q6H PRN    ondansetron (Zofran) 4 MG/2ML injection 4 mg  4 mg  Intravenous Q6H PRN    sodium chloride 0.9% infusion   Intravenous Continuous    acetaminophen (Tylenol) tab 650 mg  650 mg Oral Q4H PRN    Or    acetaminophen (Tylenol) rectal suppository 650 mg  650 mg Rectal Q4H PRN    labetalol (Trandate) 5 mg/mL injection 10 mg  10 mg Intravenous Q10 Min PRN    hydrALAzine (Apresoline) 20 mg/mL injection 10 mg  10 mg Intravenous Q2H PRN    prochlorperazine (Compazine) 10 MG/2ML injection 5 mg  5 mg Intravenous Q8H PRN    niCARdipine in sodium chloride 0.86% (carDENE) 20 mg/200mL infusion premix  5-15 mg/hr Intravenous Continuous PRN    phenylephrine in sodium chloride 0.9% (Blaine-Synephrine) 50 mg/250mL infusion premix   mcg/min Intravenous Continuous PRN    lacosamide (Vimpat) tab 50 mg  50 mg Oral Daily    sertraline (Zoloft) tab 150 mg  150 mg Oral Daily    traZODone (Desyrel) tab 50 mg  50 mg Oral Nightly    morphINE PF 2 MG/ML injection 0.5 mg  0.5 mg Intravenous Q2H PRN    Or    morphINE PF 2 MG/ML injection 1 mg  1 mg Intravenous Q2H PRN    Or    morphINE PF 2 MG/ML injection 2 mg  2 mg Intravenous Q2H PRN       Review of Systems:     Constitutional:    Denies unusual weight loss or weight gain, fever/chills or night sweats.  HEENT:                Denies changes in vision or difficulty swallowing.  Pulm:                    Denies dyspnea, cough, or sputum production  Cardiac:               Denies chest pain, palpitations or lower extremity edema.  GI:                         Denies constipation, heartburn or melena.  :                       Denies dysuria or hematuria.  Skin:                     Denies rashes or open areas.  Neuro:                  As per HPI    All other systems were reviewed and are negative.    Vitals:   Temp:  [97.8 °F (36.6 °C)-98.2 °F (36.8 °C)] 97.8 °F (36.6 °C)  Pulse:  [45-93] 65  Resp:  [9-25] 17  BP: ()/(59-83) 107/64  SpO2:  [94 %-100 %] 94 %  Body mass index is 27.93 kg/m².    Intake/Output:    Intake/Output Summary (Last 24  hours) at 8/16/2024 1053  Last data filed at 8/16/2024 0500  Gross per 24 hour   Intake 489 ml   Output 350 ml   Net 139 ml       Physical Examination:   General- No acute distress  CV- RRR  Resp- CTA Bilat  Neuro-  Mental status- awake and alert, regards and follows commands, oriented x3, speech fluent  Cranial nerves- pupils equally round and reactive to light, extraocular muscles intact, face symmetric, visual fields full  Motor- 5/5 throughout  Sens-  Intact to light touch    Diagnostics:   CT BRAIN OR HEAD (CPT=70450)    Result Date: 8/16/2024  CONCLUSION:  Stable CT appearance when compared to prior examination from the previous day.  No sign of bleed.    LOCATION:  [facility]   Dictated by (CST): Ravinder Zhao MD on 8/16/2024 at 6:33 AM     Finalized by (CST): Ravinder Zhao MD on 8/16/2024 at 6:34 AM       CT BRAIN OR HEAD (CPT=70450)    Result Date: 8/15/2024  CONCLUSION:   Small acute subdural hematoma layering along the right tentorium with maximal thickness of 2 mm.  No regional mass effect, herniation, or hydrocephalus.   Findings discussed with Dr. Vasquez at 5:55 p.m. Central standard time on 08/15/2024.    LOCATION:  [facility]   Dictated by (CST): Johnny Wetzel MD on 8/15/2024 at 5:52 PM     Finalized by (CST): Johnny Wetzel MD on 8/15/2024 at 5:56 PM        Lab Review     Recent Labs   Lab 08/15/24  1824 08/16/24  0451    141   K 3.4* 3.2*    115*   CO2 20.0* 18.0*   GLU 77 80   BUN 15 16   CREATSERUM 1.07* 0.80     Recent Labs   Lab 08/15/24  1824 08/16/24  0451   WBC 5.3 5.5   HGB 12.4 9.9*   .0 217.0       Assesment/Plan:     Neuro:  Sdh- likely traumatic 2/2 mechanical fall on doac.   S/p oac reversal, rpt ct and exam remain stable.   Cont neurochecks/pt/ot/st.  Hold oac until cleared per Neurosurg.   HA- likely post concussive.   Pt saying morphine does not work for her HA and repeatedly asking for dilaudid, c/f drug seeking behavior- will d/w hospitalist.   Seizures/pseudosz- cont  home AEDs.   Anxiety/depression- cont home meds.  Cardiac:  sbp goal 100-150  Pulmonary:  Stable on RA  Renal:  IVFs, monitor BUN/Cr  GI:  PO as tolerated  Heme/ID:  DVT- hold oac as above  Endocrine/Rheum:  Monitor glucose, sliding scale insulin prn  DVT Prophylaxis:  SCD’s, lvx    Goals of the Day: neurochecks   A total of 45 minutes of critical care time (exclusive of billable procedures) was administered to manage and/or prevent neurologic instability. This involved direct patient intervention, complex decision making, and/or extensive discussions with the patient, family, and clinical staff.    Thank you for allowing me to participate in the care of this patient.     Sofia Roldan MD, [facility]  Medical Director of System Neurosciences  Chief, Section of Neurocritical Care  [facility]

## 2024-08-16 NOTE — ED PROVIDER NOTES
Patient Seen in: [facility] 6ne-a      History     Chief Complaint   Patient presents with    Fall     Stated Complaint: PT fell Saturday, LOC, on blood thinners, c/o headache    Subjective:   HPI    This is a 49-year-old woman, on Xarelto for VTE, here for evaluation of abnormal CT scan.  She states she was recently started on Seroquel, has had a number of syncopal episodes, fainting when she gets up from seated position or laying down mainly.  States she was recently switched to trazodone because of these symptoms, and Saturday, 4 days ago got up from bed to go into the kitchen, states she woke up on the floor after falling, believes she struck the back of her head.  Denies any chest pain or shortness of breath any lightheadedness or dizziness prior to the fall.  She was thought to have possible static hypotension in setting recent vomiting diarrhea, appetite has been normal.  Does have a history of seizure disorder no recent seizures, last seizure 3 months ago.  Denies any neck pain any new injuries.      Objective:   Past Medical History:    Anxiety state    Back problem    Chronic pain    CKD (chronic kidney disease) stage 3, GFR 30-59 ml/min ([facility])    Creatinine 1.23 and GFR 53 ED visit 8/28/2020    Depression    DVT (deep venous thrombosis) ([facility])    Epilepsy ([facility])    Migraines    botox , and nerve blocks     Ovarian cyst    left 3.2cm  seen on CT in ER    Pneumonia due to COVID-19 virus    8/20    Pneumonia due to organism    covid 11/2020 , had to hospitalized 4 days     Pseudoseizure    Seizure disorder ([facility])    last seizure 2 weeks ago              Past Surgical History:   Procedure Laterality Date    Appendectomy      Appendectomy      Brain surgery      neurostim implanted./ removed     Egd N/A 09/23/2021    Procedure: ESOPHAGOGASTRODUODENOSCOPY w/ bxs, COLONOSCOPY w/ bxs;  Surgeon: Phil Murphy MD;  Location: [facility]    Neurostimulator electrode      2 yrs ago    Other       frontal lobectomy    Other      facial surgery    Other      shoulder replacement    Other      cervical fusion    Other      frontal and temporal lobectomies for seizures    Other      bilat shoulder surgery    Other      Neurostimulator history.  This was removed but wires are still present.  Unable to get brain MRI    Other surgical history      cervical fusion    Other surgical history      shoulder replacement    Other surgical history      complete jaw reconstruction     Spine surgery procedure unlisted      cervical fusion     Tonsillectomy      Total shoulder arthroplasty                  Social History     Socioeconomic History    Marital status: Life Partner   Tobacco Use    Smoking status: Never     Passive exposure: Never    Smokeless tobacco: Never   Vaping Use    Vaping status: Never Used   Substance and Sexual Activity    Alcohol use: Not Currently     Comment: rare    Drug use: Yes     Types: Cannabis     Comment: nightly    Sexual activity: Yes     Partners: Female   Other Topics Concern    Caffeine Concern No    Exercise Yes    Seat Belt Yes   Social History Narrative    ** Merged History Encounter **          Social Determinants of Health     Food Insecurity: No Food Insecurity (6/5/2024)    Received from [facility]    Food Insecurity     Currently or in the past 3 months, have you worried your food would run out before you had money to buy more?: No     In the past 12 months, have you run out of food or been unable to get more?: No   Transportation Needs: No Transportation Needs (6/5/2024)    Received from [facility]    Transportation Needs     Medical Transportation Needs?: No    Received from [facility]    Housing Stability              Review of Systems    Positive for stated Chief Complaint: Fall    Other systems are as noted in HPI.  Constitutional and vital signs reviewed.      All other systems reviewed and negative except as  noted above.    Physical Exam     ED Triage Vitals [08/15/24 1629]   /81   Pulse 79   Resp 18   Temp 98.2 °F (36.8 °C)   Temp src Temporal   SpO2 99 %   O2 Device None (Room air)       Current Vitals:   Vital Signs  BP: 111/69  Pulse: 62  Resp: 19  Temp: 98.1 °F (36.7 °C)  Temp src: Temporal  MAP (mmHg): 83    Oxygen Therapy  SpO2: 99 %  O2 Device: None (Room air)  Pulse Oximetry Type: Continuous            Physical Exam        Physical Exam  Vitals signs and nursing note reviewed.   General: Well-appearing woman sitting in bed in no acute distress.  Head: Normocephalic and atraumatic.   Spine:-No pain with range of motion.  HEENT:  Mucous membranes are moist.   Cardiovascular:  Normal rate and regular rhythm.  No Edema  Pulmonary:  Pulmonary effort is normal.  Normal breath sounds. No wheezing, rhonchi or rales.   Abdominal: Soft nontender nondistended, normal bowel sounds, no guarding no rebound tenderness  Skin: Warm and dry  Neurological: Awake alert, speech is normal, motor 5/5 in bilateral upper and lower extremities.  sensation is grossly intact throughout.  No facial asymmetry.  Stable narrow based gait.        ED Course     Labs Reviewed   COMP METABOLIC PANEL (14) - Abnormal; Notable for the following components:       Result Value    Potassium 3.4 (*)     CO2 20.0 (*)     Creatinine 1.07 (*)     ALT <7 (*)     Bilirubin, Total 0.2 (*)     Albumin 4.9 (*)     All other components within normal limits   PROTHROMBIN TIME (PT) - Abnormal; Notable for the following components:    PT 22.1 (*)     INR 1.92 (*)     All other components within normal limits   TROPONIN I HIGH SENSITIVITY - Normal   POCT GLUCOSE - Normal   CBC WITH DIFFERENTIAL WITH PLATELET   CBC WITH DIFFERENTIAL WITH PLATELET   BASIC METABOLIC PANEL (8)   MAGNESIUM   PROTHROMBIN TIME (PT)   PTT, ACTIVATED   RAINBOW DRAW BLUE     EKG    Rate, intervals and axes as noted on EKG Report.  Rate: 65  Rhythm: Sinus Rhythm  Reading: No acute  ischemic changes                 CT BRAIN OR HEAD (CPT=70450)    Result Date: 8/15/2024  PROCEDURE:  CT BRAIN OR HEAD (92604)  COMPARISON:  [facility] , CT, CT BRAIN OR HEAD (65188), 8/05/2024, 1:38 PM.  INDICATIONS:  PT fell Saturday, LOC, on blood thinners, c/o headache  TECHNIQUE:  Noncontrast CT scanning is performed through the brain. Dose reduction techniques were used. Dose information is transmitted to the ACR (American College of Radiology) NRDR (National Radiology Data Registry) which includes the Dose Index Registry.  PATIENT STATED HISTORY: (As transcribed by Technologist)  Patient fell Saturday with LOC and on blood thinners. Now complains of headache.    FINDINGS:  Small acute subdural hematoma layers along the right tentorium, maximal thickness of 2 mm (series 6, image 50).  No regional mass effect, herniation, or hydrocephalus.  Diffuse sulcal prominence concordant with age.  No CT evidence of acute large vessel transcortical infarct.  Dense dural calcifications noted along the superior convexity.  Paranasal sinuses and mastoid air cells are clear.  Bifrontal craniotomy.  No acute calvarial injury.  No organized scalp hematoma.             CONCLUSION:   Small acute subdural hematoma layering along the right tentorium with maximal thickness of 2 mm.  No regional mass effect, herniation, or hydrocephalus.   Findings discussed with Dr. Vasquez at 5:55 p.m. Central standard time on 08/15/2024.    LOCATION:  [facility]   Dictated by (CST): Johnny Wetzel MD on 8/15/2024 at 5:52 PM     Finalized by (CST): Johnny Wetzel MD on 8/15/2024 at 5:56 PM       CT BRAIN OR HEAD (CPT=70450)    Result Date: 8/5/2024  PROCEDURE:  CT BRAIN OR HEAD (90453)  COMPARISON:  [facility] , CT, CT BRAIN OR HEAD ([id number]), 5/03/2024, 9:21 AM.  INDICATIONS:  woke up on floor , hit head, head pain, on xarelto, fentanyl 80 mcg given by ems pta  TECHNIQUE:  Noncontrast CT scanning is performed through the brain. Dose reduction techniques were used.  Dose information is transmitted to the ACR (American College of Radiology) NRDR (National Radiology Data Registry) which includes the Dose Index Registry.  PATIENT STATED HISTORY: (As transcribed by Technologist)  Patient fell, doesn't remember which side she fell on    FINDINGS:  VENTRICLES/SULCI:  There is no evidence of hydrocephalus. INTRACRANIAL:  There are stable postsurgical changes with encephalomalacia and gliosis involving the anterior lateral left temporal lobe subjacent to the craniectomy defect.  There is no acute hemorrhage.  There is no mass.  There is no midline shift.  The basal cisterns are patent. SINUSES:           No sign of acute sinusitis.  MASTOIDS:          No sign of acute inflammation. SKULL:             Multiple bilateral craniotomy changes noted.  No acute fracture. OTHER:             A cervical neurostimulator is again noted posterior to the upper cervical spine.            CONCLUSION:  Stable chronic changes.  No acute disease.    LOCATION:  [place] [id number]   Dictated by (CST): Evangelista Johnson MD on 8/05/2024 at 2:02 PM     Finalized by (CST): Evangelista Johnson MD on 8/05/2024 at 2:09 PM       XR ELBOW, COMPLETE (MIN 3 VIEWS), LEFT (CPT=73080)    Result Date: 8/5/2024  PROCEDURE:  XR ELBOW, COMPLETE (MIN 3 VIEWS), LEFT (CPT=73080)  TECHNIQUE:  Three views were obtained.  COMPARISON:  None.  INDICATIONS:  woke up on floor , hit head, head pain, on xarelto, fentanyl 80 mcg given by ems pta  PATIENT STATED HISTORY: (As transcribed by Technologist)  Patient stated having left medial elbow pain after falling a couple days ago.    FINDINGS:  BONES:  No fracture, subluxation or dislocation. SOFT TISSUES:  Negative.  No visible soft tissue swelling. EFFUSION:  None visible. OTHER:  Negative.            CONCLUSION:  No fracture or dislocation.   LOCATION:  [facility]    Dictated by (CST): Cameron Daniels MD on 8/05/2024 at 1:55 PM     Finalized by (CST): Cameron Daniels MD on 8/05/2024 at 1:56 PM              MDM      49-year-old woman here for evaluation treatment falls.  CT head today shows subdural hematoma.  Differential includes syncopal episode, orthostatic syncope.  Patient is anticoagulated for VTE, this was reversed DEXA.  She is neurologically intact her blood pressure is controlled Case was discussed with neurosurgery Dr. Weber recommends admission to neuro ICU for further monitoring evaluation Case endorsed to neurointensivist Dr. Roldan who agrees with plan for admission, case endorsed to [facility] hospitalist who agrees with plan for admission.    I independently viewed and interpreted the following imaging: CT head shows small right subdural hematoma  Admission disposition: 8/15/2024  6:36 PM                                        Medical Decision Making      Disposition and Plan     Clinical Impression:  1. Intracranial hemorrhage (HCC)    2. Syncope and collapse         Disposition:  Admit  8/15/2024  6:36 pm    Follow-up:  No follow-up provider specified.        Medications Prescribed:  Current Discharge Medication List                            Hospital Problems       Present on Admission  Date Reviewed: 7/15/2024            ICD-10-CM Noted POA    * (Principal) Intracranial hemorrhage (HCC) I62.9 8/15/2024 Unknown

## 2024-08-16 NOTE — ED QUICK NOTES
Orders for admission, patient is aware of plan and ready to go upstairs. Any questions, please call ED RN cait at extension [phone].     Patient Covid vaccination status: Fully vaccinated     COVID Test Ordered in ED: None    COVID Suspicion at Admission: N/A    Running Infusions:      Mental Status/LOC at time of transport: A&O x3     Other pertinent information:   CIWA score: N/A   NIH score:
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
## 2024-08-16 NOTE — SLP NOTE
ADULT SWALLOWING EVALUATION    ASSESSMENT    ASSESSMENT/OVERALL IMPRESSION:  Patient seen for swallowing evaluation per protocol as she was admitted post multiple falls and found to have sustained right tentorium SDH with thickness of   2mm. She is alert and up in bed upon my arrival. She denied any current dysphagia but did report remote history of intermittent dysphagia related to previous medical events. She reports history of craniotomy with subsequent chronic short term memory impairment. She also notes history of seizure. She reports titanium implants in her jaw which impacts her ability to open her mouth fully and does appear to impact clarity of speech though she is 100% intelligible. She denied any perceived change from her cognitive communication baseline.    Oral mechanism exam remarkable for reduced mandibular ROM and questionable subtle lingual deviation to the left. Speech is 100% intelligible as noted above. No apparent expressive or receptive language deficits. Intact oral retrieval and containment. Oral prep and transit functional. Patient declined solid trials for this exam but did eat solids earlier this morning denying any difficulty with chewing or swallowing. Laryngeal excursion judged to be of adequate strength and timeliness to palpation. There were no overt signs of aspiration noted.      Patient appears appropriate for regular consistency solids and thin liquids. She also presents at her cognitive communication baseline. Will sign off.     Tabares Assessment of Swallow Function Score: No abnormality detected (200)    RECOMMENDATIONS   Diet Recommendations - Solids: Regular  Diet Recommendations - Liquids: Thin Liquids                           Aspiration Precautions: Slow rate;Upright position;Small bites and sips  Medication Administration Recommendations: No restrictions  Treatment Plan/Recommendations: No further inpatient SLP service warranted    HISTORY   MEDICAL HISTORY  Reason for  Referral: Stroke protocol    Problem List  Principal Problem:    Intracranial hemorrhage (HCC)  Active Problems:    Subdural hematoma (HCC)    Intractable acute post-traumatic headache      Past Medical History  Past Medical History:    Anxiety state    Back problem    Chronic pain    CKD (chronic kidney disease) stage 3, GFR 30-59 ml/min (HCC)    Creatinine 1.23 and GFR 53 ED visit 8/28/2020    Depression    DVT (deep venous thrombosis) ([facility])    Epilepsy ([facility])    Migraines    botox , and nerve blocks     Ovarian cyst    left 3.2cm  seen on CT in ER    Pneumonia due to COVID-19 virus    8/20    Pneumonia due to organism    covid 11/2020 , had to hospitalized 4 days     Pseudoseizure    Seizure disorder ([facility])    last seizure 2 weeks ago          Diet Prior to Admission: Regular;Thin liquids  Precautions: Aspiration;Seizure    Patient/Family Goals: to get better    SWALLOWING HISTORY  Current Diet Consistency: Regular;Thin liquids  Dysphagia History: as above  Imaging Results:   Brain CT from 8/15/24 revealed:  CONCLUSION:       Small acute subdural hematoma layering along the right tentorium with maximal thickness of 2 mm.  No regional mass effect, herniation, or hydrocephalus.       Findings discussed with Dr. Vasquez at 5:55 p.m. Central standard time on 08/15/2024.            LOCATION:  [facility]         Dictated by (CST): Johnny Wetzel MD on 8/15/2024 at 5:52 PM       Finalized by (CST): Johnny Wetzel MD on 8/15/2024 at 5:56 PM     SUBJECTIVE       OBJECTIVE   ORAL MOTOR EXAMINATION  Dentition: Functional  Symmetry: Within Functional Limits  Strength: Within Functional Limits  Tone: Within Functional Limits  Range of Motion:  (reduced mouth opening)  Rate of Motion: Reduced    Voice Quality: Clear  Respiratory Status: Unlabored  Consistencies Trialed: Thin liquids  Method of Presentation: Staff/Clinician assistance;Straw;Consecutive swallows  Patient Positioning: Upright;Midline (in bed)    Oral Phase of Swallow:  Within Functional Limits                      Pharyngeal Phase of Swallow: Within Functional Limits           (Please note: Silent aspiration cannot be evaluated clinically. Videofluoroscopic Swallow Study is required to rule-out silent aspiration.)    Esophageal Phase of Swallow: No complaints consistent with possible esophageal involvement                  FOLLOW UP  Treatment Plan/Recommendations: No further inpatient SLP service warranted     Follow Up Needed (Documentation Required): No       Thank you for your referral.   If you have any questions, please contact Gwyn Frias MS CCC-SLP  Pager [phone]

## 2024-08-16 NOTE — PHYSICAL THERAPY NOTE
PHYSICAL THERAPY EVALUATION - INPATIENT     Room Number: 6605/6605-A  Evaluation Date: 8/16/2024  Type of Evaluation: Initial  Physician Order: PT Eval and Treat    Presenting Problem: Intracranial hemorrhage  Co-Morbidities : SA/SI, migraines, epilepsy, seizure disorder, and recent dx of DVT  Reason for Therapy: Mobility Dysfunction and Discharge Planning    CT Brain  CONCLUSION:       Small acute subdural hematoma layering along the right tentorium with maximal thickness of 2 mm.  No regional mass effect, herniation, or hydrocephalus.         PHYSICAL THERAPY ASSESSMENT   Patient is currently functioning near baseline with bed mobility, transfers, and gait.  Prior to admission, patient's baseline is IND.  At this time pt is near baseline and PT anticipates that pt will improve as pt continues to recover.    Patient will benefit from continued skilled PT Services for duration of hospitalization, however, given the patient is functioning near baseline level do not anticipate skilled therapy needs at discharge .    PLAN                  Patient has met all skilled IPPT goals at this time. Patient will be discharged from Physical Therapy services.  Please re-order if a new functional limitation presents during this admission.        PHYSICAL THERAPY MEDICAL/SOCIAL HISTORY  History related to current admission: Patient is a 49 year old female admitted on 8/15/2024 from [facility] for Intracranial hemorrhage.      HOME SITUATION  Type of Home: House   Home Layout: One level                Lives With: Roommate  Drives: Yes  Patient Owned Equipment: None  Patient Regularly Uses: Glasses    Prior Level of [place]: Pt states that she lives in a house, with a roommate. Pt is IND with all ADLs and Mobility.    SUBJECTIVE  \"I still feel nauseated\"      OBJECTIVE  Precautions: None  Fall Risk: Standard fall risk    WEIGHT BEARING RESTRICTION  Weight Bearing Restriction: None                PAIN ASSESSMENT  Rating: Unable to  rate  Location: Bilateral Hips  Management Techniques: Other (Comment) (RN was notified)    COGNITION  Overall Cognitive Status:  WFL - within functional limits    RANGE OF MOTION AND STRENGTH ASSESSMENT  Upper extremity ROM and strength are within functional limits     Lower extremity ROM is within functional limits     Lower extremity strength is within functional limits       BALANCE  Static Sitting: Fair +  Dynamic Sitting: Fair +  Static Standing: Fair +  Dynamic Standing: Fair +    ADDITIONAL TESTS  Additional Tests: Modified Juliette              Modified [place]: 2                  ACTIVITY TOLERANCE                         O2 WALK       NEUROLOGICAL FINDINGS                        AM-PAC '6-Clicks' INPATIENT SHORT FORM - BASIC MOBILITY  How much difficulty does the patient currently have...  Patient Difficulty: Turning over in bed (including adjusting bedclothes, sheets and blankets)?: None   Patient Difficulty: Sitting down on and standing up from a chair with arms (e.g., wheelchair, bedside commode, etc.): None   Patient Difficulty: Moving from lying on back to sitting on the side of the bed?: None   How much help from another person does the patient currently need...   Help from Another: Moving to and from a bed to a chair (including a wheelchair)?: None   Help from Another: Need to walk in hospital room?: A Little   Help from Another: Climbing 3-5 steps with a railing?: A Little       AM-PAC Score:  Raw Score: 22   Approx Degree of Impairment: 20.91%   Standardized Score (AM-PAC Scale): 53.28   CMS Modifier (G-Code): CJ    FUNCTIONAL ABILITY STATUS  Gait Assessment   Functional Mobility/Gait Assessment  Gait Assistance: Supervision  Distance (ft): 200  Assistive Device: None  Pattern: Within Functional Limits    Skilled Therapy Provided     Bed Mobility:  Rolling: NT  Supine to sit: Mod I   Sit to supine: Mod I     Transfer Mobility:  Sit to stand: Mod I   Stand to sit: Mod i  Gait = Sup 200ft      Therapist's Comments: Pt was observed with no LOB when ambulating. Pt reports that she is feeling pain at the hips however pt does not report that she has weakness. Educated pt, that if pain continues pt should follow up with primary physician for outpatient physical therapy.     Exercise/Education Provided:  Bed mobility  Body mechanics  Gait training  Strengthening  Transfer training    Patient End of Session: Up in chair;Call light within reach;Needs met;RN aware of session/findings;All patient questions and concerns addressed      Patient Evaluation Complexity Level:  History Moderate - 1 or 2 personal factors and/or co-morbidities   Examination of body systems Low - addressing 1-2 elements   Clinical Presentation Low - Stable   Clinical Decision Making Low - Stable       PT Session Time: 23 minutes  Therapeutic Activity: 15 minutes

## 2024-08-16 NOTE — PROGRESS NOTES
[facility]  Neurocritical Care APRN Progress Note    NAME: Azeb Millan - ROOM: [facility]5/6605-A - MRN: [id number] - Age: 49 year old - :1975    History Of Present Illness:  Azeb Millan is a 49 year old female with PMHx significant for anxiety, depression with recent SA/SI, migraines, epilepsy, seizure disorder, and recent dx of DVT in July--on Xarelto.  Additionally, she reports frequent falls with last being on Saturday with head injury and possible brief LOC.  She presented to ED for evaluation d/t persistent headache, unlike migraines.  CT brain showed thin subdural hematoma layering along the right tentorium.  Neuro critical care consulted and she has been admitted to Neuro ICU for monitoring.    She reports ongoing headache--top of the head, but no visual disturbance, speech disturbance, dizziness, numbness, tingling, weakness, or neck pain.  No recent illness, fever, dyspnea, chest pain, abdominal pain, or N/V/D.    Past Medical History:  Past Medical History:    Anxiety state    Back problem    Chronic pain    CKD (chronic kidney disease) stage 3, GFR 30-59 ml/min ([facility])    Creatinine 1.23 and GFR 53 ED visit 2020    Depression    DVT (deep venous thrombosis) ([facility])    Epilepsy ([facility])    Migraines    botox , and nerve blocks     Ovarian cyst    left 3.2cm  seen on CT in ER    Pneumonia due to COVID-19 virus        Pneumonia due to organism    covid 2020 , had to hospitalized 4 days     Pseudoseizure    Seizure disorder ([facility])    last seizure 2 weeks ago     Past Surgical History:   Past Surgical History:   Procedure Laterality Date    Appendectomy      Appendectomy      Brain surgery      neurostim implanted./ removed     Egd N/A 2021    Procedure: ESOPHAGOGASTRODUODENOSCOPY w/ bxs, COLONOSCOPY w/ bxs;  Surgeon: Phil Murphy MD;  [place] [facility] [place]    Neurostimulator electrode      2 yrs ago    Other      frontal lobectomy    Other      facial  surgery    Other      shoulder replacement    Other      cervical fusion    Other      frontal and temporal lobectomies for seizures    Other      bilat shoulder surgery    Other      Neurostimulator history.  This was removed but wires are still present.  Unable to get brain MRI    Other surgical history      cervical fusion    Other surgical history      shoulder replacement    Other surgical history      complete jaw reconstruction     Spine surgery procedure unlisted      cervical fusion     Tonsillectomy      Total shoulder arthroplasty        Family History:   Family History   Problem Relation Age of Onset    Hypertension Father     Other (hiv) Father     Other (Other) Father         HIV    Other (Kidney) Father         Failing Kidneys    Alcohol and Other Disorders Associated Mother     Depression Mother     Other (Other) Mother         MRSA    No Known Problems Maternal Grandmother     No Known Problems Maternal Grandfather     No Known Problems Paternal Grandmother     No Known Problems Paternal Grandfather     Hypertension Brother      Social History:    reports that she has never smoked. She has never been exposed to tobacco smoke. She has never used smokeless tobacco. She reports that she does not currently use alcohol. She reports current drug use. Drug: Cannabis.     Review of Systems:   A comprehensive 10 point review of systems was completed.  Pertinent positives and negatives noted in the HPI.    Current Facility-Administered Medications   Medication Dose Route Frequency    HYDROmorphone (Dilaudid) 1 MG/ML injection 0.5 mg  0.5 mg Intravenous Q30 Min PRN    [START ON 8/16/2024] ALPRAZolam (Xanax) tab 1 mg  1 mg Oral Daily    LORazepam (Ativan) tab 1 mg  1 mg Oral Q4H PRN    QUEtiapine (SEROquel) tab 200 mg  200 mg Oral Nightly    traZODone (Desyrel) tab 100 mg  100 mg Oral Nightly    [START ON 8/16/2024] lacosamide (Vimpat) tab 50 mg  50 mg Oral Daily    lacosamide (Vimpat) tab 100 mg  100 mg Oral  BID    lamoTRIgine (LaMICtal) tab 200 mg  200 mg Oral BID    [START ON 8/16/2024] lurasidone (Latuda) tab 20 mg  20 mg Oral Daily with breakfast    [START ON 8/16/2024] sertraline (Zoloft) tab 100 mg  100 mg Oral Daily    topiramate (TopaMAX) tab 200 mg  200 mg Oral BID    acetaminophen (Tylenol) tab 650 mg  650 mg Oral Q6H PRN    ondansetron (Zofran) 4 MG/2ML injection 4 mg  4 mg Intravenous Q6H PRN    sodium chloride 0.9% infusion   Intravenous Continuous    acetaminophen (Tylenol) tab 650 mg  650 mg Oral Q4H PRN    Or    acetaminophen (Tylenol) rectal suppository 650 mg  650 mg Rectal Q4H PRN    labetalol (Trandate) 5 mg/mL injection 10 mg  10 mg Intravenous Q10 Min PRN    hydrALAzine (Apresoline) 20 mg/mL injection 10 mg  10 mg Intravenous Q2H PRN    prochlorperazine (Compazine) 10 MG/2ML injection 5 mg  5 mg Intravenous Q8H PRN    niCARdipine in sodium chloride 0.86% (carDENE) 20 mg/200mL infusion premix  5-15 mg/hr Intravenous Continuous PRN    phenylephrine in sodium chloride 0.9% (Blaine-Synephrine) 50 mg/250mL infusion premix   mcg/min Intravenous Continuous PRN     OBJECTIVE  Vitals:  BP 96/75   Pulse 53   Temp 98.1 °F (36.7 °C) (Temporal)   Resp 14   Ht 170.2 cm (5' 7\")   Wt 178 lb 5.6 oz (80.9 kg)   LMP 08/01/2024   SpO2 100%   BMI 27.93 kg/m²           Physical Exam:    General Appearance: Alert, cooperative, no distress, appears stated age  Neck: Supple, symmetrical, trachea midline, no carotid bruits, adenopathy, or JVD  Lungs: Clear to auscultation bilaterally, respirations unlabored  Heart: Regular rate and rhythm, S1 and S2 normal, no murmur, rub or gallop  Abdomen: Soft, non-tender, bowel sounds active all four quadrants, no masses, no organomegaly  Extremities: Extremities normal, atraumatic, no cyanosis or edema, capillary refill <3 sec.    Pulses: 2+ and symmetric all extremities  Skin: Skin color, texture, turgor normal for ethnicity, no rashes or lesions, warm and dry  Neurologic:  This patient is alert and orientated x 3.  Speech fluent--appears mildly dysarthric but patient reports no change from baseline. Pupils equally round and reactive to light.  3+ brisk bilaterally.  EOMs intact.  Visual fields are full.  Tongue is midline. Face is symmetrical. Uvula and palate elevate symmetrically.  Shrug shoulders normal bilaterally.  The rest of the cranial nerves are grossly intact.  Sensation to light touch is intact bilaterally.  Motor: No Drift. No arm or leg weakness noted. 5/5 bilaterally.  Finger-to-nose coordination is intact.  Gait deferred.    Data this admission:  CT BRAIN OR HEAD (CPT=70450)  Result Date: 8/15/2024  CONCLUSION:   Small acute subdural hematoma layering along the right tentorium with maximal thickness of 2 mm.  No regional mass effect, herniation, or hydrocephalus.   Findings discussed with Dr. Vasquez at 5:55 p.m. Central standard time on 08/15/2024.    LOCATION:  [facility]   Dictated by (CST): Johnny Wetzel MD on 8/15/2024 at 5:52 PM     Finalized by (CST): Johnny Wetzel MD on 8/15/2024 at 5:56 PM       Labs:  Lab Results   Component Value Date    WBC 5.3 08/15/2024    HGB 12.4 08/15/2024    HCT 39.2 08/15/2024    .0 08/15/2024    CREATSERUM 1.07 08/15/2024    BUN 15 08/15/2024     08/15/2024    K 3.4 08/15/2024     08/15/2024    CO2 20.0 08/15/2024    GLU 77 08/15/2024    CA 9.5 08/15/2024    ALB 4.9 08/15/2024    ALKPHO 63 08/15/2024    BILT 0.2 08/15/2024    TP 7.9 08/15/2024    AST 14 08/15/2024    ALT <7 08/15/2024    INR 1.92 08/15/2024     Assessment/Plan:  1.  Acute right tentorium subdural hematoma--thin layering of 2mm--likely traumatic given repeated falls  -SDH order set  -Neuro checks Q1h  -Maintain SBP between 100-150 mmHg  -Labetalol 10mg IV PRN, Hydralazine 10 mg IV PRN, Nicardipine gtt, Neosynephrine gtt to maintain SBP   -Hold all anticoagulants and antiplatelets   -Continue home medications and monitor for need to escalate AEDs  -Seizure  precautions  -EEG if indicated  -CT scan @ 6h sooner if patient deteriorates   -PT, OT, and ST  -Neurosurgery consulted  -ICH score after initial brain imaging resulted is  0    2.  Headache--hx migraines   -Continue home medications   -PRN pain meds    3.  Seizure disorder, epilepsy--reports last seizure about 3 months ago   -Denies recent seizure activity   -Continue home medications as reported   -Monitor for seizure activity   -EEG only if suspected seizure   -Seizure precautions    4.  Depression, anxiety   -Continue home medications     F/E/N:  -IV fluids  -Follow lytes and replete prn    ABCDEF Bundle  A- Assess, prevent and manage pain--prn pain medications  B- Spontaneous breathing trials--NA  C- Sedation--RASS 0  D- Delirium--CAM-ICU  Neg  E- Early mobility--Bedrest  F- Family engagement--Update as available  G- Central Line/ Hernandez--none    Proph:  -No a/c at this time d/t evidence of bleed--will need to resume DAOC asap given recent DVT dx  -SCD    Dispo:     Code Status: Full Code  -Neuro ICU monitoring    Plan of care discussed with Neuro-Intensivist On-Call, Dr Jamar Lincoln, LIN[place]  Critical Care NP  [facility]  Spect: [id number]  Pgr: [id number]    A total of 35 minutes of critical care time (exclusive of billable procedures) was administered. This involved direct patient intervention, complex decision making, and/or extensive discussions with the patient, family, and clinical staff.    The 21st Century Cures Act makes medical notes like these available to patients in the interest of transparency. Please be advised this is a medical document. Medical documents are intended to carry relevant information, facts as evident, and the clinical opinion of the practitioner. The medical note is intended as peer to peer communication and may appear blunt or direct. It is written in medical language and may contain abbreviations or verbiage that are unfamiliar.

## 2024-08-16 NOTE — PLAN OF CARE
[facility]  [facility]/Ox[facility], RA  Neurocheck assessment complete  Denies pain & nausea  Reg diet

## 2024-08-16 NOTE — PLAN OF CARE
Patient awake and alert neuro intact complaints of headache 8 on pain scale medicated with morphine and tylenol.  Had some nausea medicated with zofran.  Walked with PT.  Ultrasound of the leg completed.  Hematology notified.    Problem: PAIN - ADULT  Goal: Verbalizes/displays adequate comfort level or patient's stated pain goal  Description: INTERVENTIONS:  - Encourage pt to monitor pain and request assistance  - Assess pain using appropriate pain scale  - Administer analgesics based on type and severity of pain and evaluate response  - Implement non-pharmacological measures as appropriate and evaluate response  - Consider cultural and social influences on pain and pain management  - Manage/alleviate anxiety  - Utilize distraction and/or relaxation techniques  - Monitor for opioid side effects  - Notify MD/LIP if interventions unsuccessful or patient reports new pain  - Anticipate increased pain with activity and pre-medicate as appropriate  Outcome: Progressing

## 2024-08-17 VITALS
SYSTOLIC BLOOD PRESSURE: 104 MMHG | TEMPERATURE: 98 F | BODY MASS INDEX: 28 KG/M2 | DIASTOLIC BLOOD PRESSURE: 77 MMHG | WEIGHT: 178.38 LBS | OXYGEN SATURATION: 96 % | HEIGHT: 67 IN | HEART RATE: 97 BPM | RESPIRATION RATE: 18 BRPM

## 2024-08-17 PROBLEM — F41.9 ANXIETY AND DEPRESSION: Status: ACTIVE | Noted: 2023-12-28

## 2024-08-17 PROBLEM — F32.A ANXIETY AND DEPRESSION: Status: ACTIVE | Noted: 2023-12-28

## 2024-08-17 PROBLEM — F51.01 PRIMARY INSOMNIA: Status: ACTIVE | Noted: 2024-08-17

## 2024-08-17 LAB
ANION GAP SERPL CALC-SCNC: 8 MMOL/L (ref 0–18)
BASOPHILS # BLD AUTO: 0.01 X10(3) UL (ref 0–0.2)
BASOPHILS NFR BLD AUTO: 0.2 %
BUN BLD-MCNC: 10 MG/DL (ref 9–23)
CALCIUM BLD-MCNC: 8.8 MG/DL (ref 8.7–10.4)
CHLORIDE SERPL-SCNC: 114 MMOL/L (ref 98–112)
CO2 SERPL-SCNC: 18 MMOL/L (ref 21–32)
CREAT BLD-MCNC: 0.8 MG/DL
EGFRCR SERPLBLD CKD-EPI 2021: 91 ML/MIN/1.73M2 (ref 60–?)
EOSINOPHIL # BLD AUTO: 0.14 X10(3) UL (ref 0–0.7)
EOSINOPHIL NFR BLD AUTO: 2.5 %
ERYTHROCYTE [DISTWIDTH] IN BLOOD BY AUTOMATED COUNT: 14.7 %
GLUCOSE BLD-MCNC: 87 MG/DL (ref 70–99)
HCT VFR BLD AUTO: 33.9 %
HGB BLD-MCNC: 10.5 G/DL
IMM GRANULOCYTES # BLD AUTO: 0 X10(3) UL (ref 0–1)
IMM GRANULOCYTES NFR BLD: 0 %
LYMPHOCYTES # BLD AUTO: 1.55 X10(3) UL (ref 1–4)
LYMPHOCYTES NFR BLD AUTO: 27.9 %
MCH RBC QN AUTO: 26.6 PG (ref 26–34)
MCHC RBC AUTO-ENTMCNC: 31 G/DL (ref 31–37)
MCV RBC AUTO: 85.8 FL
MONOCYTES # BLD AUTO: 0.49 X10(3) UL (ref 0.1–1)
MONOCYTES NFR BLD AUTO: 8.8 %
NEUTROPHILS # BLD AUTO: 3.37 X10 (3) UL (ref 1.5–7.7)
NEUTROPHILS # BLD AUTO: 3.37 X10(3) UL (ref 1.5–7.7)
NEUTROPHILS NFR BLD AUTO: 60.6 %
OSMOLALITY SERPL CALC.SUM OF ELEC: 288 MOSM/KG (ref 275–295)
PLATELET # BLD AUTO: 219 10(3)UL (ref 150–450)
POTASSIUM SERPL-SCNC: 3.9 MMOL/L (ref 3.5–5.1)
RBC # BLD AUTO: 3.95 X10(6)UL
SODIUM SERPL-SCNC: 140 MMOL/L (ref 136–145)
WBC # BLD AUTO: 5.6 X10(3) UL (ref 4–11)

## 2024-08-17 PROCEDURE — 90792 PSYCH DIAG EVAL W/MED SRVCS: CPT | Performed by: OTHER

## 2024-08-17 PROCEDURE — 99232 SBSQ HOSP IP/OBS MODERATE 35: CPT | Performed by: OTHER

## 2024-08-17 RX ORDER — HYDROCODONE BITARTRATE AND ACETAMINOPHEN 5; 325 MG/1; MG/1
1-2 TABLET ORAL EVERY 6 HOURS PRN
Qty: 20 TABLET | Refills: 0 | Status: SHIPPED | OUTPATIENT
Start: 2024-08-17

## 2024-08-17 NOTE — PLAN OF CARE
NURSING DISCHARGE NOTE    Discharged Home via Ambulatory.  Accompanied by RN  Belongings Taken by patient/family.  IV removed  AVS reviewed  All question answered

## 2024-08-17 NOTE — CONSULTS
[facility]  Psychiatric Evaluation    Azeb Millan YOB: 1975   Age/Gender 49 year old female MRN [id number]   [facility]NE-A PCP Nara Harkins MD     Date of Service:  8/17/2024     Allergies:  Avocado, Flagyl [metronidazole], and Shellfish-derived products    Reason for consultation: None given, assumption is medications might be contributing to falls  Requesting provider: Hospitalist service      Psychiatric impression:  Axis I: Major depressive disorder, recurrent, severe in partial remission.  Anxiety disorder not otherwise specified.  Primary insomnia.  Axis III: Epilepsy, recent recurrent falls, current subdural hematoma   Axis IV: Mild  Axis V: 55      Recommendations:  1.  Even without subjective symptoms, this is still most likely orthostasis related to polypharmacy.  I suggested the patient continue to work with her outpatient providers to pare down or switch timing of several of her medications which could contribute to fall risk.  In particular, today, I asked her to switch from zolpidem to Ambien CR 12.5 mg nightly, so that she does not have rebound insomnia after 3 to 4 hours of sleep production.  She may also consider moving Lamictal  mg to morning dosing.  2.  We discussed nonpharmacologic measures to improve sleep hygiene and also reduce risk of falls.  3.  She does not require further psychiatric intervention while in the hospital.      Data base:    Chief Complaint: \"This is now happened 6 times\"       Reason for Admission/History of Present Illness:  Azeb is a 49 year old female.  She has a history of severe recurrent depression with epilepsy and has outpatient psychiatric and neurologic care in place.  She has been in and out of area hospitals for the last 6 months with recurrent falls, occurring exclusively in the middle of the night, without subjective orthostatic symptoms.  She came to [facility] after falling and hitting her head and  sustaining a small subdural hematoma.  Psychiatry is asked to evaluate her medications.    Patient reports regarding mental health symptoms, she is in a good state of mind and is not severely depressed or having suicidal thinking or behavior.  She is growing frustrated of recurrent falls without premonitory symptoms and without apparent neurologic cause.    Regarding sleep routine, she states she goes to bed at a reasonable hour, does use medical cannabis at night, does not drink excessive fluid prior to bedtime.  She wakes frequently between 2 and 3 AM and tries to sit at the side of the bed for some time before standing up.  She falls without any premonition or lightheadedness.  She denies any chest pain, palpitations, shortness of breath, dyspnea on exertion or lower extremity edema.    Past Psychiatric/Medication History:  She has a history of psychiatric inpatient treatment at least twice previously and was seen by our consult service in January of this year for intentional overdose.    Past Medical History:   Past Medical History:    Anxiety state    Back problem    Chronic pain    CKD (chronic kidney disease) stage 3, GFR 30-59 ml/min ([facility])    Creatinine 1.23 and GFR 53 ED visit 8/28/2020    Depression    DVT (deep venous thrombosis) ([facility])    Epilepsy ([facility])    Migraines    botox , and nerve blocks     Ovarian cyst    left 3.2cm  seen on CT in ER    Pneumonia due to COVID-19 virus    8/20    Pneumonia due to organism    covid 11/2020 , had to hospitalized 4 days     Pseudoseizure    Seizure disorder ([facility])    last seizure 2 weeks ago       Past Surgical History:   Past Surgical History:   Procedure Laterality Date    Appendectomy      Appendectomy      Brain surgery      neurostim implanted./ removed     Egd N/A 09/23/2021    Procedure: ESOPHAGOGASTRODUODENOSCOPY w/ bxs, COLONOSCOPY w/ bxs;  Surgeon: Phil Murphy MD;  [place]: [facility] [place] [facility] [place]    Neurostimulator electrode      2 yrs ago    Other       frontal lobectomy    Other      facial surgery    Other      shoulder replacement    Other      cervical fusion    Other      frontal and temporal lobectomies for seizures    Other      bilat shoulder surgery    Other      Neurostimulator history.  This was removed but wires are still present.  Unable to get brain MRI    Other surgical history      cervical fusion    Other surgical history      shoulder replacement    Other surgical history      complete jaw reconstruction     Spine surgery procedure unlisted      cervical fusion     Tonsillectomy      Total shoulder arthroplasty         Family History:   Family History   Problem Relation Age of Onset    Hypertension Father     Other (hiv) Father     Other (Other) Father         HIV    Other (Kidney) Father         Failing Kidneys    Alcohol and Other Disorders Associated Mother     Depression Mother     Other (Other) Mother         MRSA    No Known Problems Maternal Grandmother     No Known Problems Maternal Grandfather     No Known Problems Paternal Grandmother     No Known Problems Paternal Grandfather     Hypertension Brother        Developmental/Social History:  She lives with a roommate.  Financial support through Social Security disability.  She has cats.  She participates in suicide's Anonymous.    ROS:  No recent fever or chills, CP, SOB, AP, falls, weakness, head injuries or seizures.    Exam:  MSE  We met over video.  She is alert and oriented to person place and situation.  No abnormal movements are seen.  Speech is fluent.  She reports word-finding difficulties but I did not see evidence of this.  Affect is euthymic.  Thought process linear and sequential.  No suicide or homicide thoughts, auditory or visual hallucinations or delusions.  Insight and judgment are normal.  Memory intact for immediate, recent and remote by conduct of interview.  Concentration normal by conduct of interview.  Intelligence average by language usage.  Gait and station were  not assessed.    Patient Strengths/Assets:  Patient's strengths include cooperative and has care in place as evidenced by medical history.    Suicide Risk Assessments:  Overall level of suicide risk is low. This is evidenced by the following:     Risk Factors:  History of previous suicide attempt, anxiety and depression    Environmental Factors:  Medical    Protective Factors:  She is not currently suicidal.      Assessment/Diagnoses:  Primary Psychiatric Diagnoses  See impression above       Secondary Psychiatric Diagnoses         Rule Out Diagnoses         Pervasive Diagnoses         Pertinent Non-Psychiatric Diagnoses         Problem List:  Recurrent falls, polypharmacy    Plan:  See recommendations above    Medications:  [unfilled]      Evangelista Trevizo MD  8/17/2024

## 2024-08-17 NOTE — PLAN OF CARE
Assumed care at 0730  A/Ox4, RA, VSS  Tele, NSR  Pain meds & antiemetics per MAR  Reg diet  PRN ativan  Neuro checks   Pending DC  All needs met at this time    Problem: PAIN - ADULT  Goal: Verbalizes/displays adequate comfort level or patient's stated pain goal  Description: INTERVENTIONS:  - Encourage pt to monitor pain and request assistance  - Assess pain using appropriate pain scale  - Administer analgesics based on type and severity of pain and evaluate response  - Implement non-pharmacological measures as appropriate and evaluate response  - Consider cultural and social influences on pain and pain management  - Manage/alleviate anxiety  - Utilize distraction and/or relaxation techniques  - Monitor for opioid side effects  - Notify MD/LIP if interventions unsuccessful or patient reports new pain  - Anticipate increased pain with activity and pre-medicate as appropriate  Outcome: Progressing     Problem: NEUROLOGICAL - ADULT  Goal: Achieves stable or improved neurological status  Description: INTERVENTIONS  - Assess for and report changes in neurological status  - Initiate measures to prevent increased intracranial pressure  - Maintain blood pressure and fluid volume within ordered parameters to optimize cerebral perfusion and minimize risk of hemorrhage  - Monitor temperature, glucose, and sodium. Initiate appropriate interventions as ordered  Outcome: Progressing     Problem: NEUROLOGICAL - ADULT  Goal: Absence of seizures  Description: INTERVENTIONS  - Monitor for seizure activity  - Administer anti-seizure medications as ordered  - Monitor neurological status  Outcome: Progressing     Problem: NEUROLOGICAL - ADULT  Goal: Remains free of injury related to seizure activity  Description: INTERVENTIONS:  - Maintain airway, patient safety  and administer oxygen as ordered  - Monitor patient for seizure activity, document and report duration and description of seizure to MD/LIP  - If seizure occurs, turn patient  to side and suction secretions as needed  - Reorient patient post seizure  - Seizure pads on all 4 side rails  - Instruct patient/family to notify RN of any seizure activity  - Instruct patient/family to call for assistance with activity based on assessment  Outcome: Progressing

## 2024-08-17 NOTE — PLAN OF CARE
Assumed pt care at 1930  Pt is A&Ox4, following commands.   Pt on room air, VSS.  NSR  PRN Morphine for head pain  Pt one person assist./stby assist  Pt on regular diet. Tolerating diet.   PIV saline locked  Seizure precautions in place  Neurocheck complete  Accucheck  PRN Zofran  Safety precautions in place  Bed in lowest position, call light in reach.     Problem: NEUROLOGICAL - ADULT  Goal: Achieves stable or improved neurological status  Description: INTERVENTIONS  - Assess for and report changes in neurological status  - Initiate measures to prevent increased intracranial pressure  - Maintain blood pressure and fluid volume within ordered parameters to optimize cerebral perfusion and minimize risk of hemorrhage  - Monitor temperature, glucose, and sodium. Initiate appropriate interventions as ordered  Outcome: Progressing  Goal: Absence of seizures  Description: INTERVENTIONS  - Monitor for seizure activity  - Administer anti-seizure medications as ordered  - Monitor neurological status  Outcome: Progressing  Goal: Remains free of injury related to seizure activity  Description: INTERVENTIONS:  - Maintain airway, patient safety  and administer oxygen as ordered  - Monitor patient for seizure activity, document and report duration and description of seizure to MD/LIP  - If seizure occurs, turn patient to side and suction secretions as needed  - Reorient patient post seizure  - Seizure pads on all 4 side rails  - Instruct patient/family to notify RN of any seizure activity  - Instruct patient/family to call for assistance with activity based on assessment  Outcome: Progressing     Problem: PAIN - ADULT  Goal: Verbalizes/displays adequate comfort level or patient's stated pain goal  Description: INTERVENTIONS:  - Encourage pt to monitor pain and request assistance  - Assess pain using appropriate pain scale  - Administer analgesics based on type and severity of pain and evaluate response  - Implement  non-pharmacological measures as appropriate and evaluate response  - Consider cultural and social influences on pain and pain management  - Manage/alleviate anxiety  - Utilize distraction and/or relaxation techniques  - Monitor for opioid side effects  - Notify MD/LIP if interventions unsuccessful or patient reports new pain  - Anticipate increased pain with activity and pre-medicate as appropriate  Outcome: Progressing

## 2024-08-17 NOTE — OCCUPATIONAL THERAPY NOTE
OCCUPATIONAL THERAPY EVALUATION - INPATIENT    Room Number: 3621/3621-A  Evaluation Date: 8/17/2024     Type of Evaluation: Initial  Presenting Problem: persisent headache, CT showing thin subdural hematoma layering along the right tentorium    Physician Order: IP Consult to Occupational Therapy  Reason for Therapy:  ADL/IADL Dysfunction and Discharge Planning      OCCUPATIONAL THERAPY ASSESSMENT   Patient is a 49 year old female admitted on 8/15/2024 with Presenting Problem: persisent headache, CT showing thin subdural hematoma layering along the right tentorium. Co-Morbidities : SA/SI, migraines, epilepsy, seizure disorder, and recent dx of DVT  Patient is currently functioning at baseline with toileting, bathing, upper body dressing, lower body dressing, grooming, eating, dynamic sitting balance, static standing balance, dynamic standing balance, maintaining seated position, and functional standing tolerance.  Prior to admission, patient's baseline is IND with all ADL's and IADL's without assistive device.  Patient met all OT goals at IND level.  Patient reports no further questions/concerns at this time.     Patient with no further need for continued skilled OT Services       WEIGHT BEARING RESTRICTION  Weight Bearing Restriction: None                Recommendations for nursing staff:   Transfers: IND  Toileting location: Toilet    EVALUATION SESSION:  Patient at start of session: in supine  FUNCTIONAL TRANSFER ASSESSMENT  Sit to Stand: Edge of Bed  Edge of Bed: Independent  Toilet Transfer: Independent    BED MOBILITY  Rolling: Independent  Supine to Sit : Independent  Sit to Supine (OT): Independent    BALANCE ASSESSMENT  Static Sitting: Independent  Static Standing: Independent    FUNCTIONAL ADL ASSESSMENT  Grooming Standing: Independent  UB Dressing Seated: Independent  LB Dressing Seated: Independent  Toileting Seated: Independent      ACTIVITY TOLERANCE: Pt in supine, performing bed mobility IND.  Sit<>stand without assistive device IND. Performing functional mobility within room and hallway to simulate household distances without assistive device IND. Simulated toilet transfer IND.                          O2 SATURATIONS       COGNITION  Overall Cognitive Status:  WFL - within functional limits  Attention Span:  attends with cues to redirect  COGNITION ASSESSMENTS       Upper Extremity:   ROM: within functional limits   Strength: is within functional limits  Coordination:  Gross motor: WFL  Fine motor: WFL  Sensation: WFL    EDUCATION PROVIDED  Patient: Role of Occupational Therapy; Plan of Care; Discharge Recommendations; Adaptive Equipment Recommendations; DME Recommendations; Functional Transfer Techniques; Compensatory ADL Techniques; Energy Conservation; Fall Prevention  Patient's Response to Education: Verbalized Understanding    Equipment used: none  Demonstrates functional use    Patient End of Session: In bed;Needs met;RN aware of session/findings;Call light within reach;All patient questions and concerns addressed    OCCUPATIONAL PROFILE    HOME SITUATION  Type of Home: House  Home Layout: One level  Lives With: Roommate    Toilet and Equipment: Standard height toilet  Shower/Tub and Equipment: Tub-shower combo  Other Equipment: None    Occupation/Status: Works at [employer]     Drives: Yes  Patient Regularly Uses: Glasses    Prior Level of Function: IND with all ADL's and IADL's without assistive device. Drives and works at [employer].     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     PAIN ASSESSMENT  Ratin  Location: Posterior LE hamstrings  Management Techniques: Body mechanics;Activity promotion;Repositioning    OBJECTIVE  Precautions: None  Fall Risk: Standard fall risk    WEIGHT BEARING RESTRICTION  Weight Bearing Restriction: None                AM-PAC ‘6-Clicks’ Inpatient Daily Activity Short Form  -    Putting on and taking off regular lower body clothing?: None  -   Bathing (including washing, rinsing, drying)?: None  -   Toileting, which includes using toilet, bedpan or urinal? : None  -   Putting on and taking off regular upper body clothing?: None  -   Taking care of personal grooming such as brushing teeth?: None  -   Eating meals?: None    AM-PAC Score:  Score: 24  Approx Degree of Impairment: 0%  Standardized Score (AM-PAC Scale): 57.54      ADDITIONAL TESTS  PHQ9: Yes    NEUROLOGICAL FINDINGS  Coordination - Finger to Nose: Symmetrical  Coordination - Finger Opposition: Symmetrical       PLAN   Patient has been evaluated and presents with no skilled Occupational Therapy needs at this time.  Patient discharged from Occupational Therapy services.  Please re-order if a new functional limitation presents during this admission.      Patient Evaluation Complexity Level:   Occupational Profile/Medical History LOW - Brief history including review of medical or therapy records    Specific performance deficits impacting engagement in ADL/IADL LOW  1 - 3 performance deficits    Client Assessment/Performance Deficits LOW - No comorbidities nor modifications of tasks    Clinical Decision Making LOW - Analysis of occupational profile, problem-focused assessments, limited treatment options    Overall Complexity LOW     OT Session Time: 22 minutes  Self-Care Home Management: 5 minutes  Therapeutic Activity: 10 minutes  Neuromuscular Re-education: 0 minutes  Therapeutic Exercise: 0 minutes  Cognitive Skills: 0 minutes  Sensory Integrative: 0 minutes  Orthotic Management and Trainin minutes  Can add/delete any of these

## 2024-08-17 NOTE — CONSULTS
[facility] HEMATOLOGY ONCOLOGY  Report of Consultation    Azeb Millan Patient Status:  Inpatient    1975 MRN [id number]   Location [facility] 3NE-A Attending Kinga Sevilla MD   Hosp Day # 2 PCP Nara Harkins MD     ADMIT DATE AND TIME: 8/15/2024  5:58 PM    ADMIT DIAGNOSIS: Intracranial hemorrhage (HCC) [I62.9]          REASON FOR CONSULTATION:     DVT history   Dub dural hematoma      HISTORY OF PRESENTING ILLNESS     Azeb Millan is a 49 year old female with recent diagnosis of right posterior tibial DVT on 7/3/2024.  She was started on Eliquis Xarelto.    She now is admitted after falls and CT scan demonstrating subdural hematoma. The anticoagulation was reversed with Kcentra.    She was managed conservatively and follow-up CT scan showed stability.    Hematology was consulted regarding anticoagulation    No past history of DVT  History of suicide attempt.  History of multiple falls           PERTINENT HISTORY:     History:  Past Medical History:    Anxiety state    Back problem    Chronic pain    CKD (chronic kidney disease) stage 3, GFR 30-59 ml/min (HCC)    Creatinine 1.23 and GFR 53 ED visit 2020    Depression    DVT (deep venous thrombosis) (HCC)    Epilepsy (HCC)    Migraines    botox , and nerve blocks     Ovarian cyst    left 3.2cm  seen on CT in ER    Pneumonia due to COVID-19 virus        Pneumonia due to organism    covid 2020 , had to hospitalized 4 days     Pseudoseizure    Seizure disorder (HCC)    last seizure 2 weeks ago     Past Surgical History:   Procedure Laterality Date    Appendectomy      Appendectomy      Brain surgery      neurostim implanted./ removed     Egd N/A 2021    Procedure: ESOPHAGOGASTRODUODENOSCOPY w/ bxs, COLONOSCOPY w/ bxs;  Surgeon: Phil Murphy MD;  [place] [facility] [place]    Neurostimulator electrode      2 yrs ago    Other      frontal lobectomy    Other      facial surgery    Other      shoulder  replacement    Other      cervical fusion    Other      frontal and temporal lobectomies for seizures    Other      bilat shoulder surgery    Other      Neurostimulator history.  This was removed but wires are still present.  Unable to get brain MRI    Other surgical history      cervical fusion    Other surgical history      shoulder replacement    Other surgical history      complete jaw reconstruction     Spine surgery procedure unlisted      cervical fusion     Tonsillectomy      Total shoulder arthroplasty       Family History   Problem Relation Age of Onset    Hypertension Father     Other (hiv) Father     Other (Other) Father         HIV    Other (Kidney) Father         Failing Kidneys    Alcohol and Other Disorders Associated Mother     Depression Mother     Other (Other) Mother         MRSA    No Known Problems Maternal Grandmother     No Known Problems Maternal Grandfather     No Known Problems Paternal Grandmother     No Known Problems Paternal Grandfather     Hypertension Brother        SOCIAL HX   reports that she has never smoked. She has never been exposed to tobacco smoke. She has never used smokeless tobacco. She reports that she does not currently use alcohol. She reports current drug use. Drug: Cannabis.    Allergies:  Allergies   Allergen Reactions    Avocado OTHER (SEE COMMENTS)     Per PATIENT, SWOLLEN FACE    Flagyl [Metronidazole] OTHER (SEE COMMENTS)     Convulsions    Shellfish-Derived Products SWELLING     facial       Medications:    Pre-Admission Meds:  Current Outpatient Medications   Medication Instructions    ALPRAZolam ER (XANAX XR) 1 mg, Oral, Every morning    lacosamide (VIMPAT) 100 mg, Oral, 2 times daily    lacosamide (VIMPAT) 50 mg, Oral, Daily    lamoTRIgine (LAMICTAL) 200 mg, Oral, 2 times daily    LORazepam (ATIVAN) 1 mg, Oral, Every 4 hours PRN    QUEtiapine (SEROQUEL) 200 mg, Oral, Nightly    rivaroxaban 15 & 20 MG Oral Tablet Therapy Pack Take As Directed based on package  instructions: Days 1-21: 15 mg by mouth twice daily Days 22-30: 20 mg by mouth once daily    sertraline (ZOLOFT) 100 mg, Oral, Daily, Take 1 and a half tablets daily for 150mg dose total    SUMAtriptan Succinate (IMITREX) 100 mg, Oral, 2 times daily PRN    topiramate (TOPAMAX) 200 mg, Oral, 2 times daily    traZODone (DESYREL) 50 mg, Oral, Nightly    zolpidem (AMBIEN) 10 mg, Oral, Nightly PRN       Current Inpatient Meds:   enoxaparin  40 mg Subcutaneous Daily    lacosamide  50 mg Oral Nightly    lamoTRIgine  200 mg Oral BID    topiramate  200 mg Oral BID    sertraline  150 mg Oral Daily    traZODone  50 mg Oral Nightly       Infusion   sodium chloride 75 mL/hr at 08/16/24 0400    niCARdipine      phenylephrine         PRN    acetaminophen    LORazepam    ondansetron    acetaminophen **OR** acetaminophen    labetalol    hydrALAzine    prochlorperazine    niCARdipine    phenylephrine    morphINE **OR** morphINE **OR** morphINE    Review of Systems:  A 12-point review of systems was done with pertinent positives and negatives per the HPI.       Physical Exam:   Blood pressure 96/62, pulse 67, temperature 98.3 °F (36.8 °C), temperature source Oral, resp. rate 16, height 5' 7\" (1.702 m), weight 178 lb 5.6 oz (80.9 kg), last menstrual period 08/01/2024, SpO2 96%, not currently breastfeeding.    Performance Status: 1   General: Patient is alert and oriented x 3, not in acute distress.   HEENT: No Icterus. Oropharynx is clear.  Recent history of DVT in July   Neck:  No palpable lymphadenopathy. Neck is supple.   Chest: Clear to auscultation.   Heart: Regular rate and rhythm.    Abdomen: Soft, non tender with good bowel sounds.   Extremities: Pedal pulses are present. No edema.   Neurological: Grossly intact.    Lymphatics: There is no palpable lymphadenopathy           DIAGNOSTIC WORK UP:     Laboratory Data:      Recent Results (from the past 24 hour(s))   Potassium    Collection Time: 08/16/24  2:55 PM   Result Value Ref  Range    Potassium 4.3 3.5 - 5.1 mmol/L   POCT Glucose    Collection Time: 08/16/24  9:22 PM   Result Value Ref Range    POC Glucose 89 70 - 99 mg/dL   CBC With Differential With Platelet    Collection Time: 08/17/24  7:31 AM   Result Value Ref Range    WBC 5.6 4.0 - 11.0 x10(3) uL    RBC 3.95 3.80 - 5.30 x10(6)uL    HGB 10.5 (L) 12.0 - 16.0 g/dL    HCT 33.9 (L) 35.0 - 48.0 %    .0 150.0 - 450.0 10(3)uL    MCV 85.8 80.0 - 100.0 fL    MCH 26.6 26.0 - 34.0 pg    MCHC 31.0 31.0 - 37.0 g/dL    RDW 14.7 %    Neutrophil Absolute Prelim 3.37 1.50 - 7.70 x10 (3) uL    Neutrophil Absolute 3.37 1.50 - 7.70 x10(3) uL    Lymphocyte Absolute 1.55 1.00 - 4.00 x10(3) uL    Monocyte Absolute 0.49 0.10 - 1.00 x10(3) uL    Eosinophil Absolute 0.14 0.00 - 0.70 x10(3) uL    Basophil Absolute 0.01 0.00 - 0.20 x10(3) uL    Immature Granulocyte Absolute 0.00 0.00 - 1.00 x10(3) uL    Neutrophil % 60.6 %    Lymphocyte % 27.9 %    Monocyte % 8.8 %    Eosinophil % 2.5 %    Basophil % 0.2 %    Immature Granulocyte % 0.0 %       Recent Labs   Lab 08/15/24  1824 08/16/24  0451 08/17/24  0731   RBC 4.67 3.72* 3.95   HGB 12.4 9.9* 10.5*   HCT 39.2 31.7* 33.9*   MCV 83.9 85.2 85.8   MCH 26.6 26.6 26.6   MCHC 31.6 31.2 31.0   RDW 14.6 14.6 14.7   NEPRELIM 3.18 2.84 3.37   WBC 5.3 5.5 5.6   .0 217.0 219.0       CULTURE RESULTS  No results found for this visit on 08/15/24.      Imaging:    US VENOUS DOPPLER LEG BILAT - DIAG IMG (CPT=93970)    Result Date: 8/16/2024  CONCLUSION:  Unremarkable bilateral lower extremity venous ultrasound examination.   LOCATION:     Dictated by (CST): Zacarias Orozco MD on 8/16/2024 at 2:48 PM     Finalized by (CST): Zacarias Orozco MD on 8/16/2024 at 2:48 PM       CT BRAIN OR HEAD (CPT=70450)    Result Date: 8/16/2024  CONCLUSION:  Stable CT appearance when compared to prior examination from the previous day.  No sign of bleed.    LOCATION:  [facility]   Dictated by (CST): Ravinder Zhao MD on 8/16/2024 at  6:33 AM     Finalized by (CST): Ravinder Zaho MD on 8/16/2024 at 6:34 AM       CT BRAIN OR HEAD (CPT=70450)    Result Date: 8/15/2024  CONCLUSION:   Small acute subdural hematoma layering along the right tentorium with maximal thickness of 2 mm.  No regional mass effect, herniation, or hydrocephalus.   Findings discussed with Dr. Vasquez at 5:55 p.m. Central standard time on 08/15/2024.    LOCATION:  [facility]   Dictated by (CST): Johnny Wetzel MD on 8/15/2024 at 5:52 PM     Finalized by (CST): Johnny Wetzel MD on 8/15/2024 at 5:56 PM       CT BRAIN OR HEAD (CPT=70450)    Result Date: 8/5/2024  CONCLUSION:  Stable chronic changes.  No acute disease.    LOCATION:  [id number]   Dictated by (CST): Evangelista Johnson MD on 8/05/2024 at 2:02 PM     Finalized by (CST): Evangelista Johnson MD on 8/05/2024 at 2:09 PM       XR ELBOW, COMPLETE (MIN 3 VIEWS), LEFT (CPT=73080)    Result Date: 8/5/2024  CONCLUSION:  No fracture or dislocation.   LOCATION:  [facility]    Dictated by (CST): Cameron Daniels MD on 8/05/2024 at 1:55 PM     Finalized by (CST): Cameron Daniels MD on 8/05/2024 at 1:56 PM          PROBLEM LIST:     Principal Problem:    Intracranial hemorrhage (HCC)  Active Problems:    Subdural hematoma (HCC)    Intractable acute post-traumatic headache          ASSESSMENT AND PLAN:     Azeb Millan is a 49 year old female with history of DVT on anticoagulation admitted after fall with subdural hematoma.    Deep venous thrombosis of right lower extremity.  The clot was limited to  proximal distal right greater saphenous vein at the level of the right calf extending over a 20 cm segment.  There was also nonocclusive thrombus noted in 1 of mid right posterior tibial veins.   She was appropriately treated with Xarelto.  This was her first DVT event.  Ideally minimum of 3 months of anticoagulation is needed however due to recent history of fall with subdural hematoma anticoagulation has been discontinued which we completely  agree with.  Follow-up ultrasound showed no residual DVT.  Based on this we do not recommend resuming anticoagulation.    Reviewed with her and she understands the need to discontinue anticoagulation  We will sign off  Please call us if you have any questions      Thank you for allowing me to participate in the care of your patient.    Lul Murphy MD      This note was prepared using Dragon Medical voice recognition dictation software. As a result errors may occur. When identified these errors have been corrected. While every attempt is made to correct errors during dictation discrepancies may still exist. Please call me to clarify any errors.

## 2024-08-17 NOTE — BH PROGRESS NOTE
Dr. Trevizo placed on consult.  Order from hospitalist due to \"Patient with recent prior suicide attempt with multipel falls secondary to psych meds?  Needs titration of meds as she now presented with SDH\"

## 2024-08-17 NOTE — PROGRESS NOTES
[facility]   part of [facility] Hospitalist Progress Note     Azeb Millan Patient Status:  Inpatient    1975 MRN [id number]   Location [facility] 3NE-A Attending Kinga Sevilla MD   Hosp Day # 2 PCP Nara Harkins MD     CC: follow up:      Chief Complaint   Patient presents with    Fall        Subjective:     Patient seen and examined.   No issues overnight  Worried about headache as trigger for a seizure  No other issues overnight    Objective:    Review of Systems:   10 point ROS completed and was negative, except for pertinent positive and negatives stated in subjective.    Vital signs:  Temp:  [98 °F (36.7 °C)-99.3 °F (37.4 °C)] 98.3 °F (36.8 °C)  Pulse:  [62-86] 67  Resp:  [13-25] 16  BP: ()/(50-79) 96/62  SpO2:  [96 %-100 %] 96 %    Physical Exam:    General: No acute distress.   HEENT:  EOMI, PERRLA, OP clear  Respiratory: Clear to auscultation bilaterally. No wheezes. No rhonchi.  Cardiovascular: S1, S2. Regular rate and rhythm. No murmurs.  Abdomen: Soft, nontender, nondistended.  Positive bowel sounds. No rebound or guarding.  Extremities: No edema.  Neuro:  Grossly non focal, no motor deficits.        Diagnostic Data:    Labs:  Recent Labs   Lab 08/15/24  1824 24  0451   WBC 5.3 5.5   HGB 12.4 9.9*   MCV 83.9 85.2   .0 217.0   INR 1.92* 1.23*       Recent Labs   Lab 08/15/24  1824 24  0451 24  1455   GLU 77 80  --    BUN 15 16  --    CREATSERUM 1.07* 0.80  --    CA 9.5 8.3*  --    ALB 4.9*  --   --     141  --    K 3.4* 3.2* 4.3    115*  --    CO2 20.0* 18.0*  --    ALKPHO 63  --   --    AST 14  --   --    ALT <7*  --   --    BILT 0.2*  --   --    TP 7.9  --   --        Estimated Creatinine Clearance: 83.6 mL/min (based on SCr of 0.8 mg/dL).    Recent Labs   Lab 08/15/24  1824 24  0451   PTP 22.1* 15.6*   INR 1.92* 1.23*            COVID-19 Lab Results    COVID-19  Lab Results   Component Value Date     COVID19 Detected (A) 01/01/2024    COVID19 Detected (A) 12/27/2023    COVID19 Not Detected 10/11/2023       Pro-Calcitonin  No results for input(s): \"PCT\" in the last 168 hours.    Cardiac  No results for input(s): \"TROP\", \"PBNP\" in the last 168 hours.    Creatinine Kinase  No results for input(s): \"CK\" in the last 168 hours.    Inflammatory Markers  No results for input(s): \"CRP\", \"KODY\", \"LDH\", \"DDIMER\" in the last 168 hours.    Imaging: Imaging data reviewed in Epic.    Medications:    enoxaparin  40 mg Subcutaneous Daily    lacosamide  50 mg Oral Nightly    lamoTRIgine  200 mg Oral BID    topiramate  200 mg Oral BID    sertraline  150 mg Oral Daily    traZODone  50 mg Oral Nightly       Assessment & Plan:    Patient is a 49-year-old female with significant past medical history of seizure disorder, anxiety, epilepsy, depression recent right posterior tibial DVT     # Fall causing subdural hematoma  # Multiple falls recently  -CT head repeated appears to be stable  -NSGY consulted >> No surgical management at this time, continue to hold antiplatelet or anticoagulation therapy for 5 days, patient can safely resume Xarelto on 8/21 per neurosurgery  -Okay to discharge without any repeat imaging needed at this time per neurosurgery  -For falls, check orthostats, also consulted psych as her psych medications were adjusted and possible reason for her multiple falls     # Epilepsy  # History of seizures  -Resume home antiepileptics at this time, no further workup per neurocritical care     # Acute right posterior DVT  -Patient has been on Xarelto however Xarelto on hold secondary to above  -Checking venous Dopplers bilaterally >> NEG FOR DVT  -Okay to resume Xarelto in 5 days >> possibly no need for xarelto if per heme/onc pt does not need to continue  -Hematology consulted to further manage as patient does have a history of falls     Prophy:  DVT: SCDs/Lovenox  Deconditioning prevention: PT OT     DISPO:  Dc planning in  process  Heme/onc assessment pending  Samira vasques home today if cleared by neuro and psych and heme/onc  -         Keshia Serna MD  [facility] Hospitalist  Pager [phone]  Answering Service number: [phone]

## 2024-08-17 NOTE — PROGRESS NOTES
[facility]   part of [facility]    Neurology Inpatient H&P    Azeb Millan Patient Status:  Inpatient    1975 MRN [id number]   Location [facility] 3NE-A Attending Kinga Sevilla MD   Hosp Day # 2 PCP Nara Harkins MD     Date of Admission:  8/15/2024    ASSESSMENT & PLAN:    Acute SDH.  Stable by exam and Head CT.  Associated headache ongoing, would avoid opioids as much as possible.  Tylenol and  mg PRN can be given.  I advised ultimately she needs to follow with her neurologist re: headaches and seizures.  Per heme, no need to resume Xarelto at all.    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  No recent clinical events.  Continue home seizure meds ( BID,  BID, -250).    No further inpatient neurological recommendations or needs.  Will sign off.  Please call with any questions.    ~~~~~~~~~~~~~~~~~~~~~~~~~~~    CHIEF COMPLAINT (REASON FOR VISIT / CONSULTATION):  Headache/fall    HISTORY OBTAINED FROM:  Patient  Chart review    HISTORY OF PRESENT ILLNESS:  Azeb Millan is a(n) 49 year old female with possible seizure disorder s/p VNS (also a question of non-epileptic events), DVT on Xarelto, CKD, presents after multiple falls and headache, found to have small Right tentorial SDH.  Transferred out of ICU yesterday.  No recent seizures.    INTERIM HISTORY:  Continued headache and has some bilateral lower back and hip pain.  These are not new.  Eager to go home.  No seizures in past 3 months.    DATA REVIEWED (from current admission unless listed separately by month/date):    Aug 2024  Head CT  stable  Head CT 8/15 \"Small acute subdural hematoma layers along the right tentorium, maximal thickness of 2 mm (series 6, image 50) \"  Head CT  unremarkable    (I independently analyzed and interpreted this, and I agree with the reading physician report(s).)    PHYSICAL EXAMINATION:  Blood pressure 96/62, pulse 67, temperature 98.3 °F (36.8 °C), temperature  source Oral, resp. rate 16, height 67\", weight 178 lb 5.6 oz (80.9 kg), last menstrual period 08/01/2024, SpO2 96%, not currently breastfeeding.    Gen: in NAD  MSE: AAOx3, nl attn/conc, nl language  CN: EOMI, VFF, nl facial mvmt, nl palate, nl tongue  Motor: 5/5 x4  Sens: nl LT and temp x4  DTR: 2+ BUE and BLE  Coord: nl FTN b/I  Gait: deferred    ALLERGIES:  Allergies   Allergen Reactions    Avocado OTHER (SEE COMMENTS)     Per PATIENT, SWOLLEN FACE    Flagyl [Metronidazole] OTHER (SEE COMMENTS)     Convulsions    Shellfish-Derived Products SWELLING     facial       MEDICATIONS:  No current outpatient medications on file.     Current Facility-Administered Medications   Medication Dose Route Frequency    enoxaparin (Lovenox) 40 MG/0.4ML SUBQ injection 40 mg  40 mg Subcutaneous Daily    acetaminophen (Tylenol Extra Strength) tab 1,000 mg  1,000 mg Oral Q6H PRN    lacosamide (Vimpat) tab 50 mg  50 mg Oral Nightly    LORazepam (Ativan) tab 1 mg  1 mg Oral Q4H PRN    lamoTRIgine (LaMICtal) tab 200 mg  200 mg Oral BID    topiramate (TopaMAX) tab 200 mg  200 mg Oral BID    ondansetron (Zofran) 4 MG/2ML injection 4 mg  4 mg Intravenous Q6H PRN    sodium chloride 0.9% infusion   Intravenous Continuous    acetaminophen (Tylenol) tab 650 mg  650 mg Oral Q4H PRN    Or    acetaminophen (Tylenol) rectal suppository 650 mg  650 mg Rectal Q4H PRN    labetalol (Trandate) 5 mg/mL injection 10 mg  10 mg Intravenous Q10 Min PRN    hydrALAzine (Apresoline) 20 mg/mL injection 10 mg  10 mg Intravenous Q2H PRN    prochlorperazine (Compazine) 10 MG/2ML injection 5 mg  5 mg Intravenous Q8H PRN    niCARdipine in sodium chloride 0.86% (carDENE) 20 mg/200mL infusion premix  5-15 mg/hr Intravenous Continuous PRN    phenylephrine in sodium chloride 0.9% (Blaine-Synephrine) 50 mg/250mL infusion premix   mcg/min Intravenous Continuous PRN    sertraline (Zoloft) tab 150 mg  150 mg Oral Daily    traZODone (Desyrel) partial tab 50 mg  50 mg Oral  Nightly    morphINE PF 2 MG/ML injection 0.5 mg  0.5 mg Intravenous Q2H PRN    Or    morphINE PF 2 MG/ML injection 1 mg  1 mg Intravenous Q2H PRN    Or    morphINE PF 2 MG/ML injection 2 mg  2 mg Intravenous Q2H PRN       PAST MEDICAL HISTORY:  Past Medical History:    Anxiety state    Back problem    Chronic pain    CKD (chronic kidney disease) stage 3, GFR 30-59 ml/min ([facility])    Creatinine 1.23 and GFR 53 ED visit 8/28/2020    Depression    DVT (deep venous thrombosis) ([facility])    Epilepsy ([facility])    Migraines    botox , and nerve blocks     Ovarian cyst    left 3.2cm  seen on CT in ER    Pneumonia due to COVID-19 virus    8/20    Pneumonia due to organism    covid 11/2020 , had to hospitalized 4 days     Pseudoseizure    Seizure disorder ([facility])    last seizure 2 weeks ago       PAST SURGICAL HISTORY:  Past Surgical History:   Procedure Laterality Date    Appendectomy      Appendectomy      Brain surgery      neurostim implanted./ removed     Egd N/A 09/23/2021    Procedure: ESOPHAGOGASTRODUODENOSCOPY w/ bxs, COLONOSCOPY w/ bxs;  Surgeon: Phil Murphy MD;  Location: [facility]    Neurostimulator electrode      2 yrs ago    Other      frontal lobectomy    Other      facial surgery    Other      shoulder replacement    Other      cervical fusion    Other      frontal and temporal lobectomies for seizures    Other      bilat shoulder surgery    Other      Neurostimulator history.  This was removed but wires are still present.  Unable to get brain MRI    Other surgical history      cervical fusion    Other surgical history      shoulder replacement    Other surgical history      complete jaw reconstruction     Spine surgery procedure unlisted      cervical fusion     Tonsillectomy      Total shoulder arthroplasty         FAMILY HISTORY:  family history includes Alcohol and Other Disorders Associated in her mother; Depression in her mother; Hypertension in her brother and father; Kidney in her father; No  Known Problems in her maternal grandfather, maternal grandmother, paternal grandfather, and paternal grandmother; Other in her father and mother; hiv in her father.    SOCIAL HISTORY:   reports that she has never smoked. She has never been exposed to tobacco smoke. She has never used smokeless tobacco. She reports that she does not currently use alcohol. She reports current drug use. Drug: Cannabis.      Jose Peguero MD, FAES, FAAN  Board-Certified in Neurology, Epilepsy, and Clinical Neurophysiology  [facility] [place]

## 2024-08-19 ENCOUNTER — APPOINTMENT (OUTPATIENT)
Dept: CT IMAGING | Facility: HOSPITAL | Age: 49
End: 2024-08-19
Attending: EMERGENCY MEDICINE
Payer: MEDICARE

## 2024-08-19 ENCOUNTER — HOSPITAL ENCOUNTER (INPATIENT)
Facility: HOSPITAL | Age: 49
LOS: 1 days | Discharge: HOME | End: 2024-08-20
Attending: EMERGENCY MEDICINE | Admitting: HOSPITALIST
Payer: MEDICARE

## 2024-08-19 DIAGNOSIS — M54.42 ACUTE BILATERAL LOW BACK PAIN WITH BILATERAL SCIATICA: Primary | ICD-10-CM

## 2024-08-19 DIAGNOSIS — M54.41 ACUTE BILATERAL LOW BACK PAIN WITH BILATERAL SCIATICA: Primary | ICD-10-CM

## 2024-08-19 LAB
ALBUMIN SERPL-MCNC: 4.9 G/DL (ref 3.2–4.8)
ALBUMIN/GLOB SERPL: 1.5 {RATIO} (ref 1–2)
ALP LIVER SERPL-CCNC: 60 U/L
ALT SERPL-CCNC: <7 U/L
ANION GAP SERPL CALC-SCNC: 6 MMOL/L (ref 0–18)
AST SERPL-CCNC: 12 U/L (ref ?–34)
BASOPHILS # BLD AUTO: 0.03 X10(3) UL (ref 0–0.2)
BASOPHILS NFR BLD AUTO: 0.5 %
BILIRUB SERPL-MCNC: 0.2 MG/DL (ref 0.3–1.2)
BUN BLD-MCNC: 22 MG/DL (ref 9–23)
CALCIUM BLD-MCNC: 9.3 MG/DL (ref 8.7–10.4)
CHLORIDE SERPL-SCNC: 109 MMOL/L (ref 98–112)
CO2 SERPL-SCNC: 21 MMOL/L (ref 21–32)
CREAT BLD-MCNC: 0.95 MG/DL
EGFRCR SERPLBLD CKD-EPI 2021: 74 ML/MIN/1.73M2 (ref 60–?)
EOSINOPHIL # BLD AUTO: 0.13 X10(3) UL (ref 0–0.7)
EOSINOPHIL NFR BLD AUTO: 2.1 %
ERYTHROCYTE [DISTWIDTH] IN BLOOD BY AUTOMATED COUNT: 14.5 %
GLOBULIN PLAS-MCNC: 3.2 G/DL (ref 2–3.5)
GLUCOSE BLD-MCNC: 90 MG/DL (ref 70–99)
HCT VFR BLD AUTO: 37.5 %
HGB BLD-MCNC: 12.1 G/DL
IMM GRANULOCYTES # BLD AUTO: 0.03 X10(3) UL (ref 0–1)
IMM GRANULOCYTES NFR BLD: 0.5 %
LYMPHOCYTES # BLD AUTO: 1.66 X10(3) UL (ref 1–4)
LYMPHOCYTES NFR BLD AUTO: 26.5 %
MCH RBC QN AUTO: 26.7 PG (ref 26–34)
MCHC RBC AUTO-ENTMCNC: 32.3 G/DL (ref 31–37)
MCV RBC AUTO: 82.8 FL
MONOCYTES # BLD AUTO: 0.52 X10(3) UL (ref 0.1–1)
MONOCYTES NFR BLD AUTO: 8.3 %
NEUTROPHILS # BLD AUTO: 3.89 X10 (3) UL (ref 1.5–7.7)
NEUTROPHILS # BLD AUTO: 3.89 X10(3) UL (ref 1.5–7.7)
NEUTROPHILS NFR BLD AUTO: 62.1 %
OSMOLALITY SERPL CALC.SUM OF ELEC: 285 MOSM/KG (ref 275–295)
PLATELET # BLD AUTO: 283 10(3)UL (ref 150–450)
POTASSIUM SERPL-SCNC: 3.5 MMOL/L (ref 3.5–5.1)
PROT SERPL-MCNC: 8.1 G/DL (ref 5.7–8.2)
RBC # BLD AUTO: 4.53 X10(6)UL
SODIUM SERPL-SCNC: 136 MMOL/L (ref 136–145)
WBC # BLD AUTO: 6.3 X10(3) UL (ref 4–11)

## 2024-08-19 PROCEDURE — 96374 THER/PROPH/DIAG INJ IV PUSH: CPT

## 2024-08-19 PROCEDURE — 80053 COMPREHEN METABOLIC PANEL: CPT | Performed by: EMERGENCY MEDICINE

## 2024-08-19 PROCEDURE — 72131 CT LUMBAR SPINE W/O DYE: CPT | Performed by: EMERGENCY MEDICINE

## 2024-08-19 PROCEDURE — 85025 COMPLETE CBC W/AUTO DIFF WBC: CPT | Performed by: EMERGENCY MEDICINE

## 2024-08-19 PROCEDURE — 99285 EMERGENCY DEPT VISIT HI MDM: CPT

## 2024-08-19 PROCEDURE — 96375 TX/PRO/DX INJ NEW DRUG ADDON: CPT

## 2024-08-19 RX ORDER — MORPHINE SULFATE 4 MG/ML
4 INJECTION, SOLUTION INTRAMUSCULAR; INTRAVENOUS ONCE
Status: COMPLETED | OUTPATIENT
Start: 2024-08-19 | End: 2024-08-19

## 2024-08-19 RX ORDER — HYDROMORPHONE HYDROCHLORIDE 1 MG/ML
0.8 INJECTION, SOLUTION INTRAMUSCULAR; INTRAVENOUS; SUBCUTANEOUS EVERY 2 HOUR PRN
Status: DISCONTINUED | OUTPATIENT
Start: 2024-08-19 | End: 2024-08-20

## 2024-08-19 RX ORDER — RAMELTEON 8 MG/1
8 TABLET ORAL NIGHTLY
COMMUNITY

## 2024-08-19 RX ORDER — HYDROMORPHONE HYDROCHLORIDE 1 MG/ML
0.4 INJECTION, SOLUTION INTRAMUSCULAR; INTRAVENOUS; SUBCUTANEOUS EVERY 2 HOUR PRN
Status: DISCONTINUED | OUTPATIENT
Start: 2024-08-19 | End: 2024-08-20

## 2024-08-19 RX ORDER — MORPHINE SULFATE 2 MG/ML
2 INJECTION, SOLUTION INTRAMUSCULAR; INTRAVENOUS EVERY 2 HOUR PRN
Status: DISCONTINUED | OUTPATIENT
Start: 2024-08-19 | End: 2024-08-19

## 2024-08-19 RX ORDER — MORPHINE SULFATE 2 MG/ML
1 INJECTION, SOLUTION INTRAMUSCULAR; INTRAVENOUS EVERY 2 HOUR PRN
Status: DISCONTINUED | OUTPATIENT
Start: 2024-08-19 | End: 2024-08-19

## 2024-08-19 RX ORDER — SODIUM PHOSPHATE, DIBASIC AND SODIUM PHOSPHATE, MONOBASIC 7; 19 G/230ML; G/230ML
1 ENEMA RECTAL ONCE AS NEEDED
Status: DISCONTINUED | OUTPATIENT
Start: 2024-08-19 | End: 2024-08-20

## 2024-08-19 RX ORDER — LAMOTRIGINE 100 MG/1
200 TABLET ORAL 2 TIMES DAILY
Status: DISCONTINUED | OUTPATIENT
Start: 2024-08-19 | End: 2024-08-20

## 2024-08-19 RX ORDER — HYDROCODONE BITARTRATE AND ACETAMINOPHEN 5; 325 MG/1; MG/1
2 TABLET ORAL EVERY 4 HOURS PRN
Status: DISCONTINUED | OUTPATIENT
Start: 2024-08-19 | End: 2024-08-20

## 2024-08-19 RX ORDER — HYDROMORPHONE HYDROCHLORIDE 1 MG/ML
0.5 INJECTION, SOLUTION INTRAMUSCULAR; INTRAVENOUS; SUBCUTANEOUS ONCE
Status: COMPLETED | OUTPATIENT
Start: 2024-08-19 | End: 2024-08-19

## 2024-08-19 RX ORDER — SENNOSIDES 8.6 MG
17.2 TABLET ORAL NIGHTLY PRN
Status: DISCONTINUED | OUTPATIENT
Start: 2024-08-19 | End: 2024-08-20

## 2024-08-19 RX ORDER — HYDROCODONE BITARTRATE AND ACETAMINOPHEN 5; 325 MG/1; MG/1
1 TABLET ORAL EVERY 4 HOURS PRN
Status: DISCONTINUED | OUTPATIENT
Start: 2024-08-19 | End: 2024-08-20

## 2024-08-19 RX ORDER — ONDANSETRON 2 MG/ML
4 INJECTION INTRAMUSCULAR; INTRAVENOUS EVERY 6 HOURS PRN
Status: DISCONTINUED | OUTPATIENT
Start: 2024-08-19 | End: 2024-08-20

## 2024-08-19 RX ORDER — SUMATRIPTAN 50 MG/1
100 TABLET, FILM COATED ORAL 2 TIMES DAILY PRN
Status: DISCONTINUED | OUTPATIENT
Start: 2024-08-19 | End: 2024-08-20

## 2024-08-19 RX ORDER — POLYETHYLENE GLYCOL 3350 17 G/17G
17 POWDER, FOR SOLUTION ORAL DAILY PRN
Status: DISCONTINUED | OUTPATIENT
Start: 2024-08-19 | End: 2024-08-20

## 2024-08-19 RX ORDER — MORPHINE SULFATE 4 MG/ML
4 INJECTION, SOLUTION INTRAMUSCULAR; INTRAVENOUS EVERY 2 HOUR PRN
Status: DISCONTINUED | OUTPATIENT
Start: 2024-08-19 | End: 2024-08-19

## 2024-08-19 RX ORDER — BISACODYL 10 MG
10 SUPPOSITORY, RECTAL RECTAL
Status: DISCONTINUED | OUTPATIENT
Start: 2024-08-19 | End: 2024-08-20

## 2024-08-19 RX ORDER — ACETAMINOPHEN 325 MG/1
650 TABLET ORAL EVERY 4 HOURS PRN
Status: DISCONTINUED | OUTPATIENT
Start: 2024-08-19 | End: 2024-08-20

## 2024-08-19 RX ORDER — TOPIRAMATE 100 MG/1
200 TABLET, FILM COATED ORAL 2 TIMES DAILY
Status: DISCONTINUED | OUTPATIENT
Start: 2024-08-19 | End: 2024-08-20

## 2024-08-19 RX ORDER — PROCHLORPERAZINE EDISYLATE 5 MG/ML
5 INJECTION INTRAMUSCULAR; INTRAVENOUS EVERY 8 HOURS PRN
Status: DISCONTINUED | OUTPATIENT
Start: 2024-08-19 | End: 2024-08-20

## 2024-08-19 RX ORDER — HYDROMORPHONE HYDROCHLORIDE 1 MG/ML
0.2 INJECTION, SOLUTION INTRAMUSCULAR; INTRAVENOUS; SUBCUTANEOUS EVERY 2 HOUR PRN
Status: DISCONTINUED | OUTPATIENT
Start: 2024-08-19 | End: 2024-08-20

## 2024-08-19 RX ORDER — ENOXAPARIN SODIUM 100 MG/ML
40 INJECTION SUBCUTANEOUS DAILY
Status: DISCONTINUED | OUTPATIENT
Start: 2024-08-20 | End: 2024-08-20

## 2024-08-19 NOTE — ED INITIAL ASSESSMENT (HPI)
Patient to the ED via walk-in, arriving in wheelchair with c/o bilateral leg pain that both originate in the buttocks. Patient was discharged from the CNICU here at  on Saturday at 1500 d/t a brain bleed that was diagnosed here via CT scan on August 15, 2024.

## 2024-08-20 VITALS
HEART RATE: 139 BPM | HEIGHT: 67 IN | WEIGHT: 170 LBS | RESPIRATION RATE: 16 BRPM | TEMPERATURE: 99 F | BODY MASS INDEX: 26.68 KG/M2 | SYSTOLIC BLOOD PRESSURE: 107 MMHG | DIASTOLIC BLOOD PRESSURE: 73 MMHG | OXYGEN SATURATION: 93 %

## 2024-08-20 LAB
ANION GAP SERPL CALC-SCNC: 6 MMOL/L (ref 0–18)
BASOPHILS # BLD AUTO: 0.02 X10(3) UL (ref 0–0.2)
BASOPHILS NFR BLD AUTO: 0.3 %
BUN BLD-MCNC: 18 MG/DL (ref 9–23)
CALCIUM BLD-MCNC: 8.6 MG/DL (ref 8.7–10.4)
CHLORIDE SERPL-SCNC: 111 MMOL/L (ref 98–112)
CO2 SERPL-SCNC: 22 MMOL/L (ref 21–32)
CREAT BLD-MCNC: 0.87 MG/DL
EGFRCR SERPLBLD CKD-EPI 2021: 82 ML/MIN/1.73M2 (ref 60–?)
EOSINOPHIL # BLD AUTO: 0.17 X10(3) UL (ref 0–0.7)
EOSINOPHIL NFR BLD AUTO: 3 %
ERYTHROCYTE [DISTWIDTH] IN BLOOD BY AUTOMATED COUNT: 14.6 %
GLUCOSE BLD-MCNC: 105 MG/DL (ref 70–99)
HCT VFR BLD AUTO: 32.7 %
HGB BLD-MCNC: 10.6 G/DL
IMM GRANULOCYTES # BLD AUTO: 0.02 X10(3) UL (ref 0–1)
IMM GRANULOCYTES NFR BLD: 0.3 %
LYMPHOCYTES # BLD AUTO: 1.5 X10(3) UL (ref 1–4)
LYMPHOCYTES NFR BLD AUTO: 26.2 %
MCH RBC QN AUTO: 26.9 PG (ref 26–34)
MCHC RBC AUTO-ENTMCNC: 32.4 G/DL (ref 31–37)
MCV RBC AUTO: 83 FL
MONOCYTES # BLD AUTO: 0.56 X10(3) UL (ref 0.1–1)
MONOCYTES NFR BLD AUTO: 9.8 %
NEUTROPHILS # BLD AUTO: 3.45 X10 (3) UL (ref 1.5–7.7)
NEUTROPHILS # BLD AUTO: 3.45 X10(3) UL (ref 1.5–7.7)
NEUTROPHILS NFR BLD AUTO: 60.4 %
OSMOLALITY SERPL CALC.SUM OF ELEC: 290 MOSM/KG (ref 275–295)
PLATELET # BLD AUTO: 244 10(3)UL (ref 150–450)
POTASSIUM SERPL-SCNC: 3.3 MMOL/L (ref 3.5–5.1)
RBC # BLD AUTO: 3.94 X10(6)UL
SODIUM SERPL-SCNC: 139 MMOL/L (ref 136–145)
WBC # BLD AUTO: 5.7 X10(3) UL (ref 4–11)

## 2024-08-20 PROCEDURE — 80048 BASIC METABOLIC PNL TOTAL CA: CPT | Performed by: INTERNAL MEDICINE

## 2024-08-20 PROCEDURE — 97535 SELF CARE MNGMENT TRAINING: CPT

## 2024-08-20 PROCEDURE — 97165 OT EVAL LOW COMPLEX 30 MIN: CPT

## 2024-08-20 PROCEDURE — 97161 PT EVAL LOW COMPLEX 20 MIN: CPT

## 2024-08-20 PROCEDURE — 97116 GAIT TRAINING THERAPY: CPT

## 2024-08-20 PROCEDURE — 85025 COMPLETE CBC W/AUTO DIFF WBC: CPT | Performed by: INTERNAL MEDICINE

## 2024-08-20 RX ORDER — RAMELTEON 8 MG/1
8 TABLET ORAL NIGHTLY
Status: DISCONTINUED | OUTPATIENT
Start: 2024-08-20 | End: 2024-08-20

## 2024-08-20 RX ORDER — MORPHINE SULFATE 2 MG/ML
2 INJECTION, SOLUTION INTRAMUSCULAR; INTRAVENOUS
Status: DISCONTINUED | OUTPATIENT
Start: 2024-08-20 | End: 2024-08-20

## 2024-08-20 RX ORDER — DIPHENHYDRAMINE HCL 25 MG
25 CAPSULE ORAL EVERY 6 HOURS PRN
Status: DISCONTINUED | OUTPATIENT
Start: 2024-08-20 | End: 2024-08-20

## 2024-08-20 RX ORDER — POTASSIUM CHLORIDE 1.5 G/1.58G
40 POWDER, FOR SOLUTION ORAL ONCE
Status: DISCONTINUED | OUTPATIENT
Start: 2024-08-20 | End: 2024-08-20

## 2024-08-20 RX ORDER — HYDROXYZINE HYDROCHLORIDE 25 MG/1
25 TABLET, FILM COATED ORAL ONCE
Status: COMPLETED | OUTPATIENT
Start: 2024-08-20 | End: 2024-08-20

## 2024-08-20 RX ORDER — POTASSIUM CHLORIDE 1500 MG/1
40 TABLET, EXTENDED RELEASE ORAL ONCE
Status: COMPLETED | OUTPATIENT
Start: 2024-08-20 | End: 2024-08-20

## 2024-08-20 NOTE — H&P
[facility] H&P       CC:   Chief Complaint   Patient presents with    Pain    Headache        PCP: Nara Harkins MD    History of Present Illness:  Patient is a 49-year-old female significant past medical history of seizure disorder, anxiety, epilepsy, depression, recent right posterior tibial DVT, history of suicide attempt, recent fall with subdural hematoma admitted on 8/16/2024 and discharged the following day, presents again complaining of low back pain with spasms.  Patient has not had any more falls since being discharged last week, patient states that she has a vagal nerve stimulator and has been unable to get an MRI, she would like to see if she can at least get an MRI as her back pain radiating down both her legs causing her legs to buckle up and is incapacitating.  She has no new weakness numbness or tingling, no urinary or fecal incontinence.  Lives with a roommate and is unable to take care of herself at home.  She also recently had a kyphoplasty for fall and fracture.  Review of system otherwise negative no fevers no chills no chest pain no headaches no falls no lightheadedness nausea vomiting or diarrhea.      In the emergency room vital signs been stable,    She had a CT lumbar spine that actually just showed DJD with mild to moderate wedge compression deformity of the superior endplate of T12 noted with changes of previous kyphoplasty, bilateral nephro lithiasis, BMP was completely unremarkable, CBC was unremarkable hemoglobin has been stable        PMH  Past Medical History:    Anxiety state    Back problem    Chronic pain    CKD (chronic kidney disease) stage 3, GFR 30-59 ml/min ([facility])    Creatinine 1.23 and GFR 53 ED visit 8/28/2020    Depression    DVT (deep venous thrombosis) ([facility])    Epilepsy ([facility])    Migraines    botox , and nerve blocks     Ovarian cyst    left 3.2cm  seen on CT in ER    Pneumonia due to COVID-19 virus    8/20    Pneumonia due to organism    covid 11/2020 , had to  hospitalized 4 days     Pseudoseizure    Seizure disorder (HCC)    last seizure 2 weeks ago        PSH  Past Surgical History:   Procedure Laterality Date    Appendectomy      Appendectomy      Brain surgery      neurostim implanted./ removed     Egd N/A 09/23/2021    Procedure: ESOPHAGOGASTRODUODENOSCOPY w/ bxs, COLONOSCOPY w/ bxs;  Surgeon: Phil Murphy MD;  Location: [facility]    Neurostimulator electrode      2 yrs ago    Other      frontal lobectomy    Other      facial surgery    Other      shoulder replacement    Other      cervical fusion    Other      frontal and temporal lobectomies for seizures    Other      bilat shoulder surgery    Other      Neurostimulator history.  This was removed but wires are still present.  Unable to get brain MRI    Other surgical history      cervical fusion    Other surgical history      shoulder replacement    Other surgical history      complete jaw reconstruction     Spine surgery procedure unlisted      cervical fusion     Tonsillectomy      Total shoulder arthroplasty          ALL:  Allergies   Allergen Reactions    Avocado OTHER (SEE COMMENTS)     Per PATIENT, SWOLLEN FACE    Flagyl [Metronidazole] OTHER (SEE COMMENTS)     Convulsions    Shellfish-Derived Products SWELLING     facial        Home Medications:  Outpatient Medications Marked as Taking for the 8/19/24 encounter (Hospital Encounter)   Medication Sig Dispense Refill    ramelteon 8 MG Oral Tab Take 1 tablet (8 mg total) by mouth nightly.      HYDROcodone-acetaminophen 5-325 MG Oral Tab Take 1-2 tablets by mouth every 6 (six) hours as needed for Pain. 20 tablet 0    SUMAtriptan Succinate 100 MG Oral Tab Take 1 tablet (100 mg total) by mouth 2 (two) times daily as needed for Migraine.      sertraline 100 MG Oral Tab Take 1.5 tablets (150 mg total) by mouth daily. Take 1 and a half tablets daily for 150mg dose total      lamoTRIgine 200 MG Oral Tab Take 1 tablet (200 mg total) by mouth at  bedtime.      topiramate 200 MG Oral Tab Take 1 tablet (200 mg total) by mouth 2 (two) times daily.           Soc Hx  Social History     Tobacco Use    Smoking status: Never     Passive exposure: Never    Smokeless tobacco: Never   Substance Use Topics    Alcohol use: Not Currently     Comment: rare        Fam Hx  Family History   Problem Relation Age of Onset    Hypertension Father     Other (hiv) Father     Other (Other) Father         HIV    Other (Kidney) Father         Failing Kidneys    Alcohol and Other Disorders Associated Mother     Depression Mother     Other (Other) Mother         MRSA    No Known Problems Maternal Grandmother     No Known Problems Maternal Grandfather     No Known Problems Paternal Grandmother     No Known Problems Paternal Grandfather     Hypertension Brother        Review of Systems  General: Denies unintentional weight loss, fevers, or chillsneg except for above   HEENT: Denies vision loss or double vision, denies hearing loss  Cardiovascular: Denies chest pain, palpitations, peripheral edema  Pulmonary: Denies cough, shortness of breath, or wheezing  Gastrointestinal: Denies abdominal pain, melena, or hematochezia  Genitourinary: Denies urinary frequency, urgency, and dysuria  Neurologic: Denies numbness, headaches, focal weakness  Skin: Denies rashes, sores  Endocrine: Denies heat or cold intolerance, denies polydipsia  Hematologic: Denies abnormal bleeding or bruising     OBJECTIVE:  /76 (BP Location: Right arm)   Pulse 66   Temp 98.1 °F (36.7 °C) (Oral)   Resp 16   Ht 5' 7\" (1.702 m)   Wt 169 lb 15.6 oz (77.1 kg)   LMP 08/01/2024 (Exact Date)   SpO2 94%   BMI 26.62 kg/m²     BP Readings from Last 3 Encounters:   08/20/24 110/76   08/17/24 104/77   08/05/24 97/64     Wt Readings from Last 3 Encounters:   08/19/24 169 lb 15.6 oz (77.1 kg)   08/15/24 178 lb 5.6 oz (80.9 kg)   08/05/24 173 lb (78.5 kg)       Wt Readings from Last 6 Encounters:   08/19/24 169 lb 15.6 oz  (77.1 kg)   08/15/24 178 lb 5.6 oz (80.9 kg)   08/05/24 173 lb (78.5 kg)   07/15/24 170 lb (77.1 kg)   07/03/24 175 lb (79.4 kg)   07/03/24 175 lb (79.4 kg)     Gen: No acute distress, alert and oriented x 3  Pulm: Lungs clear bilaterally, good inspiratory effort   CV:  nL S1/S2  Abd: soft, NT/ND, no hepatomegaly, +BS  MSK: moving all extremities, no edema  Neuro: no focal deficits  Skin: no rashes/lesions  Psych: normal mood/affect          Diagnostic Data:    CBC/Chem  Recent Labs   Lab 08/15/24  1824 08/16/24  0451 08/17/24  0731 08/19/24  1702 08/20/24  0913   WBC 5.3 5.5 5.6 6.3 5.7   HGB 12.4 9.9* 10.5* 12.1 10.6*   MCV 83.9 85.2 85.8 82.8 83.0   .0 217.0 219.0 283.0 244.0   INR 1.92* 1.23*  --   --   --        Recent Labs   Lab 08/15/24  1824 08/16/24  0451 08/16/24  1455 08/17/24  0731 08/19/24  1702 08/20/24  0913    141  --  140 136 139   K 3.4* 3.2* 4.3 3.9 3.5 3.3*    115*  --  114* 109 111   CO2 20.0* 18.0*  --  18.0* 21.0 22.0   BUN 15 16  --  10 22 18   CREATSERUM 1.07* 0.80  --  0.80 0.95 0.87   GLU 77 80  --  87 90 105*   CA 9.5 8.3*  --  8.8 9.3 8.6*   MG  --  2.0  --   --   --   --        Recent Labs   Lab 08/15/24  1824 08/19/24  1702   ALT <7* <7*   AST 14 12   ALB 4.9* 4.9*       No results for input(s): \"TROP\" in the last 168 hours.      Radiology: CT SPINE LUMBAR (CPT=72131)    Result Date: 8/19/2024  PROCEDURE:  CT SPINE LUMBAR (CPT=72131)  COMPARISON:  [facility] , CT, CT ABDOMEN+PELVIS(CONTRAST ONLY)(CPT=74177), 6/04/2024, 1:12 PM.  [facility] , CT, CT BRAIN OR HEAD (60865), 8/16/2024, 0:28 AM.  INDICATIONS:  was just admitted for brain bleed dc'd saturday, now having pain to bilateral legs and buttock that radiates down legs ambulatory on arrival, back pain.  TECHNIQUE:  Noncontrast CT scanning is performed through the lumbar spine.  Multiplanar reconstructions are generated.  Dose reduction techniques were used. Dose information is transmitted to the ACR (American College of  Radiology) NRDR (National Radiology Data Registry) which includes the Dose Index Registry.  PATIENT STATED HISTORY: (As transcribed by Technologist)  Pain to bilateral legs and buttock that radiates down legs.    FINDINGS:  Stable mild-to-moderate wedge compression deformity of the superior endplate of T12 noted with changes of previous kyphoplasty.  There is no acute fracture or subluxation in the lumbar spine.  Moderate to severe degenerative disc space loss and endplate spurring at L4-5.  Mild to moderate degenerative disc space loss and endplate spurring at L5-S1.  Scattered mild endplate spurring and minimal degenerative disc space loss elsewhere in the lumbar spine.  Normal lumbar lordosis with anatomic alignment.  T11-12:  Minimal diffuse disc bulge. There is no significant spinal canal or neural foraminal stenosis.  T12-L1:  Minimal diffuse disc bulge.  There is no significant spinal canal or neural foraminal stenosis.  L1-2:  Minimal diffuse disc bulge with endplate osteophytes. There is no significant spinal canal or neural foraminal stenosis.  L2-3:  Diffuse disc bulge with endplate osteophytes with a small superimposed right foraminal disc protrusion.  Mild facet arthropathy with thickening of the ligamentum flavum.  No significant spinal canal stenosis.  Mild bilateral neural foraminal stenosis.  L3-4:  Diffuse disc bulge with a small superimposed right paracentral disc protrusion.  Mild facet arthropathy with thickening of ligamentum flavum. There is no significant spinal canal or neural foraminal stenosis.  L4-5:  Diffuse disc bulge with endplate osteophytes and a superimposed broad-based central disc protrusion.  Mild facet arthropathy with thickening of ligamentum flavum.  Mild spinal canal stenosis with mild bilateral subarticular zone narrowing.  Moderate bilateral neural foraminal stenosis.  L5-S1:  Diffuse disc bulge with endplate osteophytes and a superimposed central disc protrusion.  Mild  facet arthropathy.  Mild to moderate spinal canal stenosis.  Mild to moderate bilateral neural foraminal stenosis.  Nonobstructing stones in the bilateral kidneys measuring up to 5 mm.  Spinal stimulator pack along the right gluteal subcutaneous tissues.              CONCLUSION:   1. No acute fracture or subluxation in lumbar spine.  2. Multilevel degenerative changes lumbar spine are most pronounced at the L4-5 and L5-S1 levels as above.  3. Stable mild-to-moderate wedge compression deformity of the superior endplate of T12 noted with changes of previous kyphoplasty.  4. Bilateral nephrolithiasis.   Please see above for further details.  LOCATION:  [facility]   Dictated by ([facility]): Rosendo Hicks MD on 8/19/2024 at 7:25 PM     Finalized by ([facility]): Rosendo Hicks MD on 8/19/2024 at 7:30 PM       US VENOUS DOPPLER LEG BILAT - DIAG IMG (CPT=93970)    Result Date: 8/16/2024  PROCEDURE:  US VENOUS DOPPLER LEG BILAT - DIAG IMG (CPT=93970)  COMPARISON:  None.  INDICATIONS:  r/o VTE, bilateral lower extremity swelling  TECHNIQUE:  Real time, grey scale, and duplex ultrasound was used to evaluate the lower extremity venous system. B-mode two-dimensional images of the vascular structures, Doppler spectral analysis, and color flow.  Doppler imaging were performed.  The following veins were imaged bilaterally:  Common, deep, and superficial femoral, popliteal, sapheno-femoral junction, and posterior tibial veins.  PATIENT STATED HISTORY: (As transcribed by Technologist)     FINDINGS:  SAPHENOFEMORAL JUNCTION:  No reflux. THROMBI:  None visible. COMPRESSION:  Normal compressibility, phasicity, and augmentation. OTHER:  Negative.            CONCLUSION:  Unremarkable bilateral lower extremity venous ultrasound examination.   LOCATION:     Dictated by ([facility]): Zacarias Orozco MD on 8/16/2024 at 2:48 PM     Finalized by ([facility]): Zacarias Orozco MD on 8/16/2024 at 2:48 PM       CT BRAIN OR HEAD (CPT=70450)    Result Date:  8/16/2024  PROCEDURE:  CT BRAIN OR HEAD ([id number])  COMPARISON:  [facility] , CT, CT BRAIN OR HEAD ([id number]), 8/15/2024, 5:37 PM.  INDICATIONS:  f/u SDH post fall on Eliquis  TECHNIQUE:  Noncontrast CT scanning is performed through the brain. Dose reduction techniques were used. Dose information is transmitted to the ACR (American College of Radiology) NRDR (National Radiology Data Registry) which includes the Dose Index Registry.  PATIENT STATED HISTORY: (As transcribed by Technologist)  Subdural hematoma. Patient on Eliquis.    FINDINGS:  VENTRICLES/SULCI:  Ventricles and sulci are normal in size.  INTRACRANIAL:  There are no abnormal extraaxial fluid collections.  There is no midline shift.  There are no intraparenchymal brain abnormalities.  There is nothing specific for acute infarct.  There is no hemorrhage or mass lesion.  SINUSES:           No sign of acute sinusitis.  MASTOIDS:          No sign of acute inflammation. SKULL:             Postsurgical changes involving the calvarium redemonstrated. OTHER:             None.            CONCLUSION:  Stable CT appearance when compared to prior examination from the previous day.  No sign of bleed.    LOCATION:  [facility]   Dictated by ([facility]): Ravinder Zhao MD on 8/16/2024 at 6:33 AM     Finalized by ([facility]): Ravinder Zhao MD on 8/16/2024 at 6:34 AM       CT BRAIN OR HEAD (CPT=70450)    Result Date: 8/15/2024  PROCEDURE:  CT BRAIN OR HEAD (54931)  COMPARISON:  [facility] , CT, CT BRAIN OR HEAD ([id number]), 8/05/2024, 1:38 PM.  INDICATIONS:  PT fell Saturday, LOC, on blood thinners, c/o headache  TECHNIQUE:  Noncontrast CT scanning is performed through the brain. Dose reduction techniques were used. Dose information is transmitted to the ACR (American College of Radiology) NRDR (National Radiology Data Registry) which includes the Dose Index Registry.  PATIENT STATED HISTORY: (As transcribed by Technologist)  Patient fell Saturday with LOC and on blood thinners. Now complains of  headache.    FINDINGS:  Small acute subdural hematoma layers along the right tentorium, maximal thickness of 2 mm (series 6, image 50).  No regional mass effect, herniation, or hydrocephalus.  Diffuse sulcal prominence concordant with age.  No CT evidence of acute large vessel transcortical infarct.  Dense dural calcifications noted along the superior convexity.  Paranasal sinuses and mastoid air cells are clear.  Bifrontal craniotomy.  No acute calvarial injury.  No organized scalp hematoma.             CONCLUSION:   Small acute subdural hematoma layering along the right tentorium with maximal thickness of 2 mm.  No regional mass effect, herniation, or hydrocephalus.   Findings discussed with Dr. Vasquez at 5:55 p.m. Central standard time on 08/15/2024.    LOCATION:  [facility]   Dictated by ([facility]): Johnny Wetzel MD on 8/15/2024 at 5:52 PM     Finalized by ([facility]): Johnny Wetzel MD on 8/15/2024 at 5:56 PM       CT BRAIN OR HEAD (CPT=70450)    Result Date: 8/5/2024  PROCEDURE:  CT BRAIN OR HEAD ([id number])  COMPARISON:  [facility] , CT, CT BRAIN OR HEAD ([id number]), 5/03/2024, 9:21 AM.  INDICATIONS:  woke up on floor , hit head, head pain, on xarelto, fentanyl 80 mcg given by [facility] pta  TECHNIQUE:  Noncontrast CT scanning is performed through the brain. Dose reduction techniques were used. Dose information is transmitted to the ACR (American College of Radiology) NRDR (National Radiology Data Registry) which includes the Dose Index Registry.  PATIENT STATED HISTORY: (As transcribed by Technologist)  Patient fell, doesn't remember which side she fell on    FINDINGS:  VENTRICLES/SULCI:  There is no evidence of hydrocephalus. INTRACRANIAL:  There are stable postsurgical changes with encephalomalacia and gliosis involving the anterior lateral left temporal lobe subjacent to the craniectomy defect.  There is no acute hemorrhage.  There is no mass.  There is no midline shift.  The basal cisterns are patent. SINUSES:           No sign of acute  sinusitis.  MASTOIDS:          No sign of acute inflammation. SKULL:             Multiple bilateral craniotomy changes noted.  No acute fracture. OTHER:             A cervical neurostimulator is again noted posterior to the upper cervical spine.            CONCLUSION:  Stable chronic changes.  No acute disease.    LOCATION:  [id number]   Dictated by ([facility]): Evangelista Johnson MD on 8/05/2024 at 2:02 PM     Finalized by ([facility]): Evangelista Johnson MD on 8/05/2024 at 2:09 PM       XR ELBOW, COMPLETE (MIN 3 VIEWS), LEFT (CPT=73080)    Result Date: 8/5/2024  PROCEDURE:  XR ELBOW, COMPLETE (MIN 3 VIEWS), LEFT (CPT=73080)  TECHNIQUE:  Three views were obtained.  COMPARISON:  None.  INDICATIONS:  woke up on floor , hit head, head pain, on xarelto, fentanyl 80 mcg given by [facility] pta  PATIENT STATED HISTORY: (As transcribed by Technologist)  Patient stated having left medial elbow pain after falling a couple days ago.    FINDINGS:  BONES:  No fracture, subluxation or dislocation. SOFT TISSUES:  Negative.  No visible soft tissue swelling. EFFUSION:  None visible. OTHER:  Negative.            CONCLUSION:  No fracture or dislocation.   LOCATION:  [facility]    Dictated by (C[facility]): Cameron Daniels MD on 8/05/2024 at 1:55 PM     Finalized by ([facility]): Cameron Daniels MD on 8/05/2024 at 1:56 PM              ASSESSMENT / PLAN:         Patient is a 49-year-old female significant past medical history of seizure disorder, anxiety, epilepsy, depression, recent right posterior tibial DVT, history of suicide attempt, recent fall with subdural hematoma admitted on 8/16/2024 and discharged the following day, presents again complaining of low back pain with spasms    # Low back pain  -No focal neurological deficits at this time  -CT lumbar spine reviewed  -Has a vagal stimulator we will see if she can get an MRI of the lower back  -PT OT    # Recent fall causing subdural hematoma  # Multiple falls recently  -Psych meds were recently adjusted for  multiple falls, CT head done prior to discharge last admission was negative    # Epilepsy  # History of seizures  -Resume home antiepileptics at this time, no further workup    # Acute right posterior DVT  X 3 hematology was recently consulted after history of falls and subdural hematoma  -Had a repeat Doppler which was negative for DVT  -Based on this finding no further anticoagulation is recommended      Prophy:  DVT: Lovenox  Deconditioning prevention: PT OT    Dispo: admit to [facility] with telemetry    Outpatient records reviewed confirming patient's medical history and medications.     Further recommendations pending patient's clinical course.  [facility] hospitalist to continue to follow patient while in house    Time spent: greater than 95 minutes spent in d/w pt/family, coordination of care, and d/w staff.   Kinga hill MD   Internal Medicine  [facility] Hospitalist  Pager: [phone]      **Certification      PHYSICIAN Certification of Need for Inpatient Hospitalization - Initial Certification    Patient will require inpatient services that will reasonably be expected to span two midnight's based on the clinical documentation in H+P.   Based on patients current state of illness, I anticipate that, after discharge, patient will require TBD.

## 2024-08-20 NOTE — OCCUPATIONAL THERAPY NOTE
OCCUPATIONAL THERAPY EVALUATION - INPATIENT    Room Number: 3607/3607-A  Evaluation Date: 8/20/2024     Type of Evaluation: Initial  Presenting Problem: Acute B low back pain with bilateral sciatica    Physician Order: IP Consult to Occupational Therapy  Reason for Therapy:  ADL/IADL Dysfunction and Discharge Planning      OCCUPATIONAL THERAPY ASSESSMENT   Patient is a 49 year old female admitted on 8/19/2024 with Presenting Problem: Acute B low back pain with bilateral sciatica. Co-Morbidities : SA/SI, migraines, epilepsey, sz d/o, DVT, recent SDH  Patient is currently functioning {OT Functioning Baseline:77995} baseline with {OT ADLs:14249}.  Prior to admission, patient's baseline is ***.  Patient met all OT goals at *** level.  Patient reports no further questions/concerns at this time.     Patient will benefit from continued skilled OT Services {OT Services:51864}      WEIGHT BEARING RESTRICTION  Weight Bearing Restriction: None                Recommendations for nursing staff:   Transfers: ***  Toileting location: Toilet    EVALUATION SESSION:  Patient at start of session: ***  FUNCTIONAL TRANSFER ASSESSMENT  Sit to Stand: Chair; Edge of Bed  Edge of Bed: Modified Independent  Chair: Modified Independent  Toilet Transfer: Modified Independent    BED MOBILITY  Rolling: Modified Independent  Supine to Sit : Modified Independent  Sit to Supine (OT): Modified Independent  Scooting: mod I    BALANCE ASSESSMENT  Static Sitting: Independent  Sitting Bilateral: Independent  Static Standing: Modified Independent  Standing Bilateral: Modified Independent    FUNCTIONAL ADL ASSESSMENT  Eating: Independent  Grooming Seated: Independent  LB Dressing Seated: Modified Independent  Toileting Seated: Modified Independent      ACTIVITY TOLERANCE: ***                         O2 SATURATIONS       COGNITION  {Cognition:3840:::0}  COGNITION ASSESSMENTS       Upper Extremity:   ROM: within functional limits {OT UE ROM Exception to  WFLs:5379:::0}  Strength: is within functional limits {OT UE Strength Exception to WFLs:4400:::0}  Coordination:  Gross motor: ***  Fine motor: ***  Sensation: {Sensation:3868:::0}    EDUCATION PROVIDED  Patient: Role of Occupational Therapy; Plan of Care; Discharge Recommendations  Patient's Response to Education: Verbalized Understanding; Returned Demonstration    Equipment used: ***  Demonstrates functional use    Therapist comments: ***    Patient End of Session: In bed;With  staff;Needs met;Call light within reach;RN aware of session/findings;All patient questions and concerns addressed;Discussed recommendations with /    OCCUPATIONAL PROFILE    HOME SITUATION  Type of Home: Condo  Home Layout: One level  Lives With: Roommate (roommate not home durng the day)    Toilet and Equipment: Standard height toilet  Shower/Tub and Equipment: Walk-in shower;Tub-shower combo  Other Equipment: None    Occupation/Status: works at [employer]  Hand Dominance: Right  Drives: Yes  Patient Regularly Uses: Glasses    Prior Level of Function: ***    SUBJECTIVE  ***    PAIN ASSESSMENT  Ratin  Location: B LE w/transitional movements and  random spasms  Management Techniques: Nurse notified;Repositioning;Relaxation;Activity promotion;Body mechanics    OBJECTIVE  Precautions: Seizure  Fall Risk: Standard fall risk    WEIGHT BEARING RESTRICTION  Weight Bearing Restriction: None                AM-PAC ‘6-Clicks’ Inpatient Daily Activity Short Form  -   Putting on and taking off regular lower body clothing?: None  -   Bathing (including washing, rinsing, drying)?: None  -   Toileting, which includes using toilet, bedpan or urinal? : None  -   Putting on and taking off regular upper body clothing?: None  -   Taking care of personal grooming such as brushing teeth?: None  -   Eating meals?: None    AM-PAC Score:  Score: 24  Approx Degree of Impairment: 0%  Standardized Score (AM-PAC Scale):  57.54      ADDITIONAL TESTS     NEUROLOGICAL FINDINGS  Coordination - Finger to Nose: Symmetrical       PLAN   Patient has been evaluated and presents with no skilled Occupational Therapy needs at this time.  Patient discharged from Occupational Therapy services.  Please re-order if a new functional limitation presents during this admission.      Patient Evaluation Complexity Level:   Occupational Profile/Medical History {Occupational Profile/Medical History:7260}   Specific performance deficits impacting engagement in ADL/IADL {Specific performance deficits impacting engagement in ADL/IADL:7261}   Client Assessment/Performance Deficits {Client Assessment/Performance Deficits:7262}   Clinical Decision Making {Clinical Decision Makin}   Overall Complexity {Overall Complexity:7264}     OT Session Time: *** minutes  Self-Care Home Management: *** minutes  Therapeutic Activity: *** minutes  Neuromuscular Re-education: *** minutes  Therapeutic Exercise: *** minutes  Cognitive Skills: *** minutes  Sensory Integrative: *** minutes  Orthotic Management and Training: *** minutes  Can add/delete any of these

## 2024-08-20 NOTE — ED PROVIDER NOTES
Patient Seen in: [facility]      History     Chief Complaint   Patient presents with    Pain    Headache     Stated Complaint: was just admitted for brain bleed dc'd saturday, now having pain to bilateral l*    Subjective:   HPI  Patient is a 50 yo F with a history of seizure disorder, recent admission for SDH who presents to ED for evaluation of back pain. Patient reports she was discharged on Saturday and prior to discharge she developed lower back pain. Patient notes pain is worse with walking and certain positions. Patient takes norco for chronic headaches and has been taking this with no relief of her back pain. She lives alone and can barely make it a few steps without having severe pain. Pain is in her lower back and travels down her bilateral LE to her knees. She denies any injury or fall since discharge. No saddle anesthesia, urinary incontinence or retention, stool incontinence, focal weakness, numbness or tingling, hx of cancer or IVDU. No new headaches. Patient has history of recurrent falls and states she had compression fracture in spine 3 months ago s/p kyphoplasty. Patient states she cannot have MRI due to vagal stimulator.     From chart review, patient admitted last week after fall thought to be related to medications. CT showed SDH. Patient admitted to ICU with NSGY on consult. Patient was on xarelto for DVT and was given Kcentra. Plan for resuming xarelto in 5 days.     Objective:   Past Medical History:    Anxiety state    Back problem    Chronic pain    CKD (chronic kidney disease) stage 3, GFR 30-59 ml/min ([facility])    Creatinine 1.23 and GFR 53 ED visit 8/28/2020    Depression    DVT (deep venous thrombosis) ([facility])    Epilepsy ([facility])    Migraines    botox , and nerve blocks     Ovarian cyst    left 3.2cm  seen on CT in ER    Pneumonia due to COVID-19 virus    8/20    Pneumonia due to organism    covid 11/2020 , had to hospitalized 4 days     Pseudoseizure    Seizure disorder ([facility])     last seizure 2 weeks ago              Past Surgical History:   Procedure Laterality Date    Appendectomy      Appendectomy      Brain surgery      neurostim implanted./ removed     Egd N/A 09/23/2021    Procedure: ESOPHAGOGASTRODUODENOSCOPY w/ bxs, COLONOSCOPY w/ bxs;  Surgeon: Phil Murphy MD;  Location: [facility]    Neurostimulator electrode      2 yrs ago    Other      frontal lobectomy    Other      facial surgery    Other      shoulder replacement    Other      cervical fusion    Other      frontal and temporal lobectomies for seizures    Other      bilat shoulder surgery    Other      Neurostimulator history.  This was removed but wires are still present.  Unable to get brain MRI    Other surgical history      cervical fusion    Other surgical history      shoulder replacement    Other surgical history      complete jaw reconstruction     Spine surgery procedure unlisted      cervical fusion     Tonsillectomy      Total shoulder arthroplasty                  Social History     Socioeconomic History    Marital status: Life Partner   Tobacco Use    Smoking status: Never     Passive exposure: Never    Smokeless tobacco: Never   Vaping Use    Vaping status: Never Used   Substance and Sexual Activity    Alcohol use: Not Currently     Comment: rare    Drug use: Yes     Types: Cannabis     Comment: nightly    Sexual activity: Yes     Partners: Female   Other Topics Concern    Caffeine Concern No    Exercise Yes    Seat Belt Yes   Social History Narrative    ** Merged History Encounter **          Social Determinants of Health     Food Insecurity: No Food Insecurity (8/19/2024)    Food Insecurity     Food Insecurity: Never true   Transportation Needs: No Transportation Needs (8/19/2024)    Transportation Needs     Lack of Transportation: No   Housing Stability: Low Risk  (8/19/2024)    Housing Stability     Housing Instability: No              Review of Systems    Positive for stated Chief Complaint:  Pain and Headache    Other systems are as noted in HPI.  Constitutional and vital signs reviewed.      All other systems reviewed and negative except as noted above.    Physical Exam     ED Triage Vitals [08/19/24 1631]   /82   Pulse 82   Resp 22   Temp 99.9 °F (37.7 °C)   Temp src Temporal   SpO2 99 %   O2 Device None (Room air)       Current Vitals:   Vital Signs  BP: 115/82  Pulse: 67  Resp: 16  Temp: 98.3 °F (36.8 °C)  Temp src: Oral  MAP (mmHg): 89    Oxygen Therapy  SpO2: 98 %  O2 Device: None (Room air)  Pulse Oximetry Type: Continuous  Oximetry Probe Site Changed: No  Pulse Ox Probe Location: Left hand            Physical Exam  Vitals and nursing note reviewed.   Constitutional:       General: She is not in acute distress.     Appearance: She is not ill-appearing.   HENT:      Head: Normocephalic and atraumatic.      Mouth/Throat:      Mouth: Mucous membranes are moist.   Eyes:      Extraocular Movements: Extraocular movements intact.      Pupils: Pupils are equal, round, and reactive to light.   Cardiovascular:      Rate and Rhythm: Normal rate and regular rhythm.   Pulmonary:      Effort: Pulmonary effort is normal.   Abdominal:      General: There is no distension.      Palpations: Abdomen is soft.      Tenderness: There is no abdominal tenderness.   Musculoskeletal:      Cervical back: Neck supple.      Right lower leg: No edema.      Left lower leg: No edema.      Comments: Mild bilateral lumbar paraspinal TTP  No midline TTP or stepoffs of spine  +Straight leg test bilaterally  5/5 strength of UE. Able to lift both legs off bed, but limited due to pain  Normal sensation/distal pulses     Skin:     General: Skin is warm and dry.      Capillary Refill: Capillary refill takes less than 2 seconds.   Neurological:      General: No focal deficit present.      Mental Status: She is alert and oriented to person, place, and time.   Psychiatric:         Mood and Affect: Mood normal.               ED Course      Labs Reviewed   COMP METABOLIC PANEL (14) - Abnormal; Notable for the following components:       Result Value    ALT <7 (*)     Bilirubin, Total 0.2 (*)     Albumin 4.9 (*)     All other components within normal limits   CBC WITH DIFFERENTIAL WITH PLATELET   BASIC METABOLIC PANEL (8)   CBC WITH DIFFERENTIAL WITH PLATELET   RAINBOW DRAW LAVENDER   RAINBOW DRAW LIGHT GREEN   RAINBOW DRAW BLUE          ED Course as of 08/20/24 0129  ------------------------------------------------------------  Time: 08/19 1937  Value: CT SPINE LUMBAR (CPT=72131)  Comment: (Reviewed)              MDM      Patient is a 48 yo F with a history of seizure disorder, recent admission for SDH, compression fracture s/p kyphoplasty who presents to ED for evaluation of LBP radiating down bilateral LE since Saturday. Patient's pain started prior to DC after being admitted for SDH from fall. No new trauma or fall. No red flag symptoms (no saddle anesthesia, urinary or stool incontinence, urinary retention, new neurologic symptoms, fevers, hx of IVDU or cancer). VSS. On exam pt has +straight leg test bilaterally. Distally NVI. Differential includes but not limited to radiculopathy, lumbar strain or spasm. Less likely compression fracture. Low suspicion for cord compression or cauda equina syndrome with presentation given no red flag signs. Will obtain CT lumbar spine, give IV pain meds and re-evaluate.     ED Course:   CBC, CMP unremarkable. Patient was given IV morphine and dilaudid with improvement of pain. CT lumbar spine shows no acute fractures. However she still had significant pain with walking. Given concern for severity of back pain, patient being high fall risk with history of recent SDH, and patient lives alone, shared decision making used to discuss plan. Discussed going home with pain meds vs. Observation for pain control, possible PT/OT eval. Patient felt more comfortable being admitted to hospital. Admitted to [facility].          Admission disposition: 8/19/2024  8:38 PM                                        Medical Decision Making      Disposition and Plan     Clinical Impression:  1. Acute bilateral low back pain with bilateral sciatica         Disposition:  Admit  8/19/2024  8:38 pm    Follow-up:  No follow-up provider specified.        Medications Prescribed:  Current Discharge Medication List                            Hospital Problems       Present on Admission  Date Reviewed: 7/15/2024            ICD-10-CM Noted POA    * (Principal) Acute bilateral low back pain with bilateral sciatica M54.42, M54.41 8/19/2024 Unknown

## 2024-08-20 NOTE — ED QUICK NOTES
Orders for admission, patient is aware of plan and ready to go upstairs. Any questions, please call ED CARISSA diaz at extension [phone].     Patient Covid vaccination status: Fully vaccinated     COVID Test Ordered in ED: None    COVID Suspicion at Admission: N/A    Running Infusions:  None    Mental Status/LOC at time of transport: x4    Other pertinent information:   CIWA score: N/A   NIH score:  N/A

## 2024-08-20 NOTE — DIETARY NOTE
[facility]   part of [facility]  NUTRITION ASSESSMENT    Pt does not meet malnutrition criteria at this time.    NUTRITION INTERVENTION:    Meal and Snacks - Continue Regular Diet as tolerated; monitor patient po intake. Encourage adequate po of appropriate diet.  Medical Food Supplements - Pt not interested at this time. Rationale/use for oral supplements discussed.  Vitamin and Mineral Supplements - Recommend adding Multivitamin with minerals    PATIENT STATUS: 49 year old female admitted on 8/19 presents with back pain. Noted pt recently admitted last week for SDH. Pt screened d/t MST score 2. Visited pt at bedside. She reports variable appetite/PO intake which she thinks is medication related. Reports having nausea intermittently. Denies diarrhea or constipation but reports last BM 3 days ago. No chewing or swallowing difficulties. Confirmed allergy to avocado and shellfish. Reports her UBW was ~205 lbs in Dec and now weighs 170 lbs which she reports has been stable over the past couple months - reports visiting GI outpatient with all workup negative. Does have ONS at home but only drinks them if she doesn't eat much; estimates drinking one every other week. Offered ONS during admit but pt declined at this time. All questions answered at this time.    PMH: Anxiety state, CKD stage 3, Depression, DVT, Epilepsy, Migraines, Ovarian cyst, Seizure disorder     ANTHROPOMETRICS:  Ht: 170.2 cm (5' 7\")  Wt: 77.1 kg (169 lb 15.6 oz).   BMI: Body mass index is 26.62 kg/m².  IBW: 61.4 kg    WEIGHT HISTORY: Per chart, pt with ~31 lb wt loss x 10 months (15.5%, not significant) with 21 lb wt loss over past 3 months (10.5%, significant).  Patient Weight(s) for the past 336 hrs:   Weight   08/19/24 2139 77.1 kg (169 lb 15.6 oz)   08/19/24 1631 77.1 kg (170 lb)       Wt Readings from Last 18 Encounters:   08/19/24 77.1 kg (169 lb 15.6 oz)   08/15/24 80.9 kg (178 lb 5.6 oz)   08/05/24 78.5 kg (173 lb)   07/15/24 77.1 kg  (170 lb)   07/03/24 79.4 kg (175 lb)   07/03/24 79.4 kg (175 lb)   06/28/24 79.4 kg (175 lb)   06/19/24 81.6 kg (180 lb)   06/04/24 83 kg (183 lb)   05/03/24 86.2 kg (190 lb)   04/18/24 85.3 kg (188 lb)   04/08/24 85.3 kg (188 lb)   12/28/23 83 kg (182 lb 15.7 oz)   10/22/23 90.7 kg (199 lb 15.3 oz)   10/17/23 90.7 kg (200 lb)   09/21/23 90.7 kg (200 lb)   09/17/23 90.7 kg (200 lb)   08/28/23 90.7 kg (200 lb)        NUTRITION:  Diet:       Procedures    Regular/General diet Is Patient on Accuchecks? No        Percent Meals Eaten (last 3 days)       None            Food Allergies:  avocado, shellfish  Cultural/Ethnic/Cheondoism Preferences Addressed: Yes    GI SYSTEM REVIEW: nausea; last BM 8/17 per pt  Skin/Wounds: WNL    NUTRITION RELATED PHYSICAL FINDINGS:     1. Body Fat/Muscle Mass: no wasting noted     2. Fluid Accumulation: none per RN documentation     NUTRITION PRESCRIPTION: 77.1 kg  Calories: 4718-0546 calories/day (25-30 kcal/kg)  Protein:  grams protein/day (0.8-1.3 gm/kg)  Fluid: ~1 ml/kcal or per MD discretion    NUTRITION DIAGNOSIS/PROBLEM:  Inadequate oral intake related to insufficient appetite resulting in inadequate nutrition intake as evidenced by documented/reported insufficient oral intake and documented/reported unintentional weight loss    MONITOR AND EVALUATE/NUTRITION GOALS:  PO intake of 75% of meals TID - New  Weight stable within 1 to 2 lbs during admission - New      MEDICATIONS:  Reviewed     LABS:  K+ 3.3    Pt is at Moderate nutrition risk    Julia Morel RD, LDN, CNSC  Clinical Dietitian  Spectra: [phone]

## 2024-08-20 NOTE — PROGRESS NOTES
NURSING ADMISSION NOTE      Patient admitted via Cart  Oriented to room.  Safety precautions initiated.  Bed in low position.  Call light in reach.    A&Ox4. RA. Call light within reach, bed in lowest position. Will continue with plan of care.
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
## 2024-08-20 NOTE — PLAN OF CARE
Assumed pt care this morning at 0730.   Pt is A&OX4.   On room air, lungs sounds clear. VSS.  Tele: NSR  Morphine given for generalized back pain 7/10.  Non-cardiac replacement.   Regular diet.   SBA to the bathroom. Continent of bladder and bowel.   Sz precautions in place.   PT/OT to see pt.    R AC PIV saline lock.   Bed in lowest position, call light within reach, bed alarm on.     Problem: SAFETY ADULT - FALL  Goal: Free from fall injury  Description: INTERVENTIONS:  - Assess pt frequently for physical needs  - Identify cognitive and physical deficits and behaviors that affect risk of falls.  - [place] fall precautions as indicated by assessment.  - Educate pt/family on patient safety including physical limitations  - Instruct pt to call for assistance with activity based on assessment  - Modify environment to reduce risk of injury  - Provide assistive devices as appropriate  - Consider OT/PT consult to assist with strengthening/mobility  - Encourage toileting schedule  Outcome: Progressing     Problem: PAIN - ADULT  Goal: Verbalizes/displays adequate comfort level or patient's stated pain goal  Description: INTERVENTIONS:  - Encourage pt to monitor pain and request assistance  - Assess pain using appropriate pain scale  - Administer analgesics based on type and severity of pain and evaluate response  - Implement non-pharmacological measures as appropriate and evaluate response  - Consider cultural and social influences on pain and pain management  - Manage/alleviate anxiety  - Utilize distraction and/or relaxation techniques  - Monitor for opioid side effects  - Notify MD/LIP if interventions unsuccessful or patient reports new pain  - Anticipate increased pain with activity and pre-medicate as appropriate  Outcome: Progressing

## 2024-08-21 NOTE — PROGRESS NOTES
NURSING DISCHARGE NOTE    Discharged Home via Wheelchair.  Accompanied by Support staff  Belongings Taken by patient/family.    AVS paperwork printed and given to patient by day shift RN. PIV removed by this RN. Telemetry monitor removed by this RN. Patient dressed and left unit in good condition.

## 2024-12-04 NOTE — ED QUICK NOTES
PT informed this RN that she is [place] an aura and will seize\". PT then appeared to have a seizure. Bilateral arms were tense and shaking, eyes closed, discoloration to face. Vitals remained WNL. ERMD notified and at bedside. Orders received. No

## 2024-12-09 ENCOUNTER — HOSPITAL ENCOUNTER (EMERGENCY)
Facility: HOSPITAL | Age: 49
Discharge: HOME | End: 2024-12-09
Attending: EMERGENCY MEDICINE
Payer: MEDICARE

## 2024-12-09 VITALS
TEMPERATURE: 98 F | HEART RATE: 72 BPM | BODY MASS INDEX: 25.11 KG/M2 | OXYGEN SATURATION: 98 % | SYSTOLIC BLOOD PRESSURE: 95 MMHG | DIASTOLIC BLOOD PRESSURE: 55 MMHG | RESPIRATION RATE: 15 BRPM | WEIGHT: 160 LBS | HEIGHT: 67 IN

## 2024-12-09 DIAGNOSIS — G40.909 SEIZURE DISORDER (HCC): Primary | ICD-10-CM

## 2024-12-09 LAB
ALBUMIN SERPL-MCNC: 3.8 G/DL (ref 3.2–4.8)
ALBUMIN/GLOB SERPL: 1.2 {RATIO} (ref 1–2)
ALP LIVER SERPL-CCNC: 50 U/L
ALT SERPL-CCNC: <7 U/L
ANION GAP SERPL CALC-SCNC: 11 MMOL/L (ref 0–18)
AST SERPL-CCNC: 11 U/L (ref ?–34)
BASOPHILS # BLD AUTO: 0.02 X10(3) UL (ref 0–0.2)
BASOPHILS NFR BLD AUTO: 0.4 %
BILIRUB SERPL-MCNC: 0.2 MG/DL (ref 0.3–1.2)
BUN BLD-MCNC: 15 MG/DL (ref 9–23)
CALCIUM BLD-MCNC: 8.6 MG/DL (ref 8.7–10.4)
CHLORIDE SERPL-SCNC: 112 MMOL/L (ref 98–112)
CO2 SERPL-SCNC: 17 MMOL/L (ref 21–32)
CREAT BLD-MCNC: 1.05 MG/DL
EGFRCR SERPLBLD CKD-EPI 2021: 66 ML/MIN/1.73M2 (ref 60–?)
EOSINOPHIL # BLD AUTO: 0.07 X10(3) UL (ref 0–0.7)
EOSINOPHIL NFR BLD AUTO: 1.4 %
ERYTHROCYTE [DISTWIDTH] IN BLOOD BY AUTOMATED COUNT: 17 %
GLOBULIN PLAS-MCNC: 3.1 G/DL (ref 2–3.5)
GLUCOSE BLD-MCNC: 88 MG/DL (ref 70–99)
HCT VFR BLD AUTO: 36.4 %
HGB BLD-MCNC: 11.7 G/DL
IMM GRANULOCYTES # BLD AUTO: 0.01 X10(3) UL (ref 0–1)
IMM GRANULOCYTES NFR BLD: 0.2 %
LYMPHOCYTES # BLD AUTO: 1.25 X10(3) UL (ref 1–4)
LYMPHOCYTES NFR BLD AUTO: 24.3 %
MCH RBC QN AUTO: 28.3 PG (ref 26–34)
MCHC RBC AUTO-ENTMCNC: 32.1 G/DL (ref 31–37)
MCV RBC AUTO: 87.9 FL
MONOCYTES # BLD AUTO: 0.4 X10(3) UL (ref 0.1–1)
MONOCYTES NFR BLD AUTO: 7.8 %
NEUTROPHILS # BLD AUTO: 3.39 X10 (3) UL (ref 1.5–7.7)
NEUTROPHILS # BLD AUTO: 3.39 X10(3) UL (ref 1.5–7.7)
NEUTROPHILS NFR BLD AUTO: 65.9 %
OSMOLALITY SERPL CALC.SUM OF ELEC: 290 MOSM/KG (ref 275–295)
PLATELET # BLD AUTO: 186 10(3)UL (ref 150–450)
POTASSIUM SERPL-SCNC: 3.7 MMOL/L (ref 3.5–5.1)
PROT SERPL-MCNC: 6.9 G/DL (ref 5.7–8.2)
RBC # BLD AUTO: 4.14 X10(6)UL
SODIUM SERPL-SCNC: 140 MMOL/L (ref 136–145)
WBC # BLD AUTO: 5.1 X10(3) UL (ref 4–11)

## 2024-12-09 PROCEDURE — 99284 EMERGENCY DEPT VISIT MOD MDM: CPT

## 2024-12-09 PROCEDURE — 85025 COMPLETE CBC W/AUTO DIFF WBC: CPT | Performed by: EMERGENCY MEDICINE

## 2024-12-09 PROCEDURE — 96360 HYDRATION IV INFUSION INIT: CPT

## 2024-12-09 PROCEDURE — 80053 COMPREHEN METABOLIC PANEL: CPT | Performed by: EMERGENCY MEDICINE

## 2024-12-09 RX ORDER — LAMOTRIGINE 200 MG/1
2 TABLET, EXTENDED RELEASE ORAL NIGHTLY
COMMUNITY

## 2024-12-09 RX ORDER — TRAZODONE HYDROCHLORIDE 100 MG/1
100 TABLET ORAL NIGHTLY
COMMUNITY

## 2024-12-09 RX ORDER — ONDANSETRON 8 MG/1
8 TABLET, ORALLY DISINTEGRATING ORAL EVERY 8 HOURS PRN
COMMUNITY

## 2024-12-09 NOTE — ED QUICK NOTES
Pt appears drowsy, appears to have difficulty keeping left eye open. Pt does have intermittent slurred speech. Pt reports that she does feel like the left side of her face may be more weak but believes that is normal for her post seizure.

## 2024-12-09 NOTE — ED INITIAL ASSESSMENT (HPI)
Pt presents to the ED via ems from pt's work with c/o seizure. Per ems, pt was seizing upon their arrival, received Versed 5mg IN. Seizure subsided and then started again, so pt received Versed 2mg IV. Pt arrived to ER appearing drowsy but oriented, skin w/d,resps reg/unlabored. Pt reports she did not get a lot of sleep last night so believes that is why she had a seizure. Pt denies missing any doses of seizure med, takes topamax and lamictal.

## 2024-12-09 NOTE — ED PROVIDER NOTES
Patient Seen in: [facility] Emergency Department      History     Chief Complaint   Patient presents with    Seizure Disorder     Stated Complaint: seizure    Subjective:   HPI      Patient comes to the emergency department after she had a witnessed seizure at work.  The patient states that she slept poorly last night and sometimes does have seizures when she does sleep poorly.  The patient has been on Topamax and Lamictal without any change in her medications.  The patient has no recent illness or any other acute symptoms such as fever or chills.  She does have a headache which she states is common after her seizures and related to her migraines.    Objective:     Past Medical History:    Anxiety state    Back problem    Chronic pain    CKD (chronic kidney disease) stage 3, GFR 30-59 ml/min ([facility])    Creatinine 1.23 and GFR 53 ED visit 8/28/2020    Depression    DVT (deep venous thrombosis) ([facility])    Epilepsy ([facility])    Migraines    botox , and nerve blocks     Ovarian cyst    left 3.2cm  seen on CT in ER    Pneumonia due to COVID-19 virus    8/20    Pneumonia due to organism    covid 11/2020 , had to hospitalized 4 days     Pseudoseizure    Seizure disorder ([facility])    last seizure 2 weeks ago              Past Surgical History:   Procedure Laterality Date    Appendectomy      Appendectomy      Brain surgery      neurostim implanted./ removed     Egd N/A 09/23/2021    Procedure: ESOPHAGOGASTRODUODENOSCOPY w/ bxs, COLONOSCOPY w/ bxs;  Surgeon: Phil Murphy MD;  Location: [facility]    Neurostimulator electrode      2 yrs ago    Other      frontal lobectomy    Other      facial surgery    Other      shoulder replacement    Other      cervical fusion    Other      frontal and temporal lobectomies for seizures    Other      bilat shoulder surgery    Other      Neurostimulator history.  This was removed but wires are still present.  Unable to get brain MRI    Other surgical history      cervical fusion     Other surgical history      shoulder replacement    Other surgical history      complete jaw reconstruction     Spine surgery procedure unlisted      cervical fusion     Tonsillectomy      Total shoulder arthroplasty                  Social History     Socioeconomic History    Marital status: Life Partner   Tobacco Use    Smoking status: Never     Passive exposure: Never    Smokeless tobacco: Never   Vaping Use    Vaping status: Never Used   Substance and Sexual Activity    Alcohol use: Not Currently     Comment: rare    Drug use: Yes     Types: Cannabis     Comment: nightly    Sexual activity: Yes     Partners: Female   Other Topics Concern    Caffeine Concern No    Exercise Yes    Seat Belt Yes   Social History Narrative    ** Merged History Encounter **          Social Drivers of Health     Food Insecurity: No Food Insecurity (8/19/2024)    Food Insecurity     Food Insecurity: Never true   Transportation Needs: No Transportation Needs (8/19/2024)    Transportation Needs     Lack of Transportation: No   Housing Stability: Low Risk  (8/19/2024)    Housing Stability     Housing Instability: No                  Physical Exam     ED Triage Vitals [12/09/24 1344]   BP 99/67   Pulse 75   Resp 20   Temp 98.4 °F (36.9 °C)   Temp src Oral   SpO2 97 %   O2 Device None (Room air)       Current Vitals:   Vital Signs  BP: 95/55  Pulse: 72  Resp: 15  Temp: 98.4 °F (36.9 °C)  Temp src: Oral  MAP (mmHg): 69    Oxygen Therapy  SpO2: 98 %  O2 Device: None (Room air)        Physical Exam  Vitals and nursing note reviewed.   Constitutional:       Appearance: She is well-developed.   HENT:      Head: Normocephalic.   Cardiovascular:      Rate and Rhythm: Normal rate and regular rhythm.      Heart sounds: Normal heart sounds. No murmur heard.  Pulmonary:      Effort: Pulmonary effort is normal. No respiratory distress.      Breath sounds: Normal breath sounds.   Abdominal:      General: Bowel sounds are normal.      Palpations:  Abdomen is soft.      Tenderness: There is no abdominal tenderness. There is no rebound.   Musculoskeletal:         General: No tenderness. Normal range of motion.      Cervical back: Normal range of motion and neck supple.   Lymphadenopathy:      Cervical: No cervical adenopathy.   Skin:     General: Skin is warm and dry.      Findings: No rash.   Neurological:      Mental Status: She is oriented to person, place, and time.      Sensory: No sensory deficit.      Comments: Patient is drowsy with slight ptosis of the left eye.  The patient states that this is common for her after her seizures.  She is able to move all 4 extremities symmetrically and normally.            ED Course     Labs Reviewed   COMP METABOLIC PANEL (14) - Abnormal; Notable for the following components:       Result Value    CO2 17.0 (*)     Creatinine 1.05 (*)     Calcium, Total 8.6 (*)     ALT <7 (*)     Bilirubin, Total 0.2 (*)     All other components within normal limits   CBC WITH DIFFERENTIAL WITH PLATELET - Abnormal; Notable for the following components:    HGB 11.7 (*)     All other components within normal limits   RAINBOW DRAW LAVENDER   RAINBOW DRAW LIGHT GREEN   RAINBOW DRAW GOLD   RAINBOW DRAW BLUE       ED Course as of 12/09/24 1946  ------------------------------------------------------------  Time: 12/09 1459  Value: CBC With Differential With Platelet(!)  Comment: Unremarkable    ------------------------------------------------------------  Time: 12/09 1459  Value: Comp Metabolic Panel (14)(!)  Comment: Unremarkable    ------------------------------------------------------------  Time: 12/09 8765  Comment: Patient is awake and alert.  She feels comfortable going home at this point.  She does have some right facial palsy noted which she states is unchanged from baseline from previous facial injury.              MDM      Patient comes to the Emergency Department after experiencing a seizure at work.  Patient arrived postictal.   Differential diagnosis includes metabolic disorders and other acute medical conditions, but patient states that she simply did not sleep well last night and states that she has had seizures when that has occurred in the past.  While she was in the emergency department, the patient regained her normal level of alertness and stated that her seizure and recovered from seizure today is consistent with her previous seizure episodes.  She has had no other recent illness and no medication changes recently.  Patient was discharged home with instructed follow-up with her primary care physician and neurology.  She expressed understanding that she should return immediately for any acute change or worsening symptoms.        Medical Decision Making      Disposition and Plan     Clinical Impression:  1. Seizure disorder (HCC)         Disposition:  Discharge  12/9/2024  3:17 pm    Follow-up:  Nara Harkins MD  [address] [place] [address]  [address]  [facility] [address]  [phone]    Follow up            Medications Prescribed:  Discharge Medication List as of 12/9/2024  3:27 PM              Supplementary Documentation:

## 2024-12-09 NOTE — ED QUICK NOTES
Pt asleep but much easier to arouse and much more alert. Pt states she was able to sleep and is feeling better. Pt able to open each eye easily and reports weakness to left side of face has subsided.

## 2024-12-09 NOTE — CM/SW NOTE
CM assistance requested to arrange for transport back to her work where she was picked up by 911.    CM arranged lyft for patient.

## 2024-12-19 ENCOUNTER — HOSPITAL ENCOUNTER (EMERGENCY)
Facility: HOSPITAL | Age: 49
Discharge: HOME | End: 2024-12-19
Attending: EMERGENCY MEDICINE
Payer: MEDICARE

## 2024-12-19 VITALS
RESPIRATION RATE: 21 BRPM | OXYGEN SATURATION: 100 % | SYSTOLIC BLOOD PRESSURE: 96 MMHG | HEART RATE: 74 BPM | TEMPERATURE: 98 F | DIASTOLIC BLOOD PRESSURE: 64 MMHG

## 2024-12-19 DIAGNOSIS — G40.909 SEIZURE DISORDER (HCC): Primary | ICD-10-CM

## 2024-12-19 DIAGNOSIS — G43.809 OTHER MIGRAINE WITHOUT STATUS MIGRAINOSUS, NOT INTRACTABLE: ICD-10-CM

## 2024-12-19 LAB
ALBUMIN SERPL-MCNC: 4.1 G/DL (ref 3.2–4.8)
ALBUMIN/GLOB SERPL: 1.3 {RATIO} (ref 1–2)
ALP LIVER SERPL-CCNC: 54 U/L
ALT SERPL-CCNC: <7 U/L
ANION GAP SERPL CALC-SCNC: 12 MMOL/L (ref 0–18)
AST SERPL-CCNC: 13 U/L (ref ?–34)
ATRIAL RATE: 84 BPM
BASOPHILS # BLD AUTO: 0.01 X10(3) UL (ref 0–0.2)
BASOPHILS NFR BLD AUTO: 0.2 %
BILIRUB SERPL-MCNC: 0.2 MG/DL (ref 0.3–1.2)
BUN BLD-MCNC: 19 MG/DL (ref 9–23)
CALCIUM BLD-MCNC: 9 MG/DL (ref 8.7–10.4)
CHLORIDE SERPL-SCNC: 113 MMOL/L (ref 98–112)
CO2 SERPL-SCNC: 15 MMOL/L (ref 21–32)
CREAT BLD-MCNC: 1.16 MG/DL
EGFRCR SERPLBLD CKD-EPI 2021: 58 ML/MIN/1.73M2 (ref 60–?)
EOSINOPHIL # BLD AUTO: 0.05 X10(3) UL (ref 0–0.7)
EOSINOPHIL NFR BLD AUTO: 0.9 %
ERYTHROCYTE [DISTWIDTH] IN BLOOD BY AUTOMATED COUNT: 16.2 %
GLOBULIN PLAS-MCNC: 3.1 G/DL (ref 2–3.5)
GLUCOSE BLD-MCNC: 86 MG/DL (ref 70–99)
HCT VFR BLD AUTO: 41.6 %
HGB BLD-MCNC: 13.5 G/DL
IMM GRANULOCYTES # BLD AUTO: 0.01 X10(3) UL (ref 0–1)
IMM GRANULOCYTES NFR BLD: 0.2 %
LYMPHOCYTES # BLD AUTO: 1.66 X10(3) UL (ref 1–4)
LYMPHOCYTES NFR BLD AUTO: 30 %
MCH RBC QN AUTO: 28.2 PG (ref 26–34)
MCHC RBC AUTO-ENTMCNC: 32.5 G/DL (ref 31–37)
MCV RBC AUTO: 86.8 FL
MONOCYTES # BLD AUTO: 0.33 X10(3) UL (ref 0.1–1)
MONOCYTES NFR BLD AUTO: 6 %
NEUTROPHILS # BLD AUTO: 3.48 X10 (3) UL (ref 1.5–7.7)
NEUTROPHILS # BLD AUTO: 3.48 X10(3) UL (ref 1.5–7.7)
NEUTROPHILS NFR BLD AUTO: 62.7 %
OSMOLALITY SERPL CALC.SUM OF ELEC: 292 MOSM/KG (ref 275–295)
P AXIS: 47 DEGREES
P-R INTERVAL: 160 MS
PLATELET # BLD AUTO: 211 10(3)UL (ref 150–450)
PLATELETS.RETICULATED NFR BLD AUTO: 2.8 % (ref 0–7)
POTASSIUM SERPL-SCNC: 3.7 MMOL/L (ref 3.5–5.1)
PROT SERPL-MCNC: 7.2 G/DL (ref 5.7–8.2)
Q-T INTERVAL: 358 MS
QRS DURATION: 86 MS
QTC CALCULATION (BEZET): 423 MS
R AXIS: 0 DEGREES
RBC # BLD AUTO: 4.79 X10(6)UL
SODIUM SERPL-SCNC: 140 MMOL/L (ref 136–145)
T AXIS: 29 DEGREES
VENTRICULAR RATE: 84 BPM
WBC # BLD AUTO: 5.5 X10(3) UL (ref 4–11)

## 2024-12-19 PROCEDURE — 99284 EMERGENCY DEPT VISIT MOD MDM: CPT

## 2024-12-19 PROCEDURE — 93010 ELECTROCARDIOGRAM REPORT: CPT

## 2024-12-19 PROCEDURE — 96361 HYDRATE IV INFUSION ADD-ON: CPT

## 2024-12-19 PROCEDURE — 85025 COMPLETE CBC W/AUTO DIFF WBC: CPT | Performed by: EMERGENCY MEDICINE

## 2024-12-19 PROCEDURE — 80201 ASSAY OF TOPIRAMATE: CPT | Performed by: EMERGENCY MEDICINE

## 2024-12-19 PROCEDURE — 80053 COMPREHEN METABOLIC PANEL: CPT | Performed by: EMERGENCY MEDICINE

## 2024-12-19 PROCEDURE — 80175 DRUG SCREEN QUAN LAMOTRIGINE: CPT | Performed by: EMERGENCY MEDICINE

## 2024-12-19 PROCEDURE — 96375 TX/PRO/DX INJ NEW DRUG ADDON: CPT

## 2024-12-19 PROCEDURE — 96374 THER/PROPH/DIAG INJ IV PUSH: CPT

## 2024-12-19 PROCEDURE — 93005 ELECTROCARDIOGRAM TRACING: CPT

## 2024-12-19 RX ORDER — METOCLOPRAMIDE HYDROCHLORIDE 5 MG/ML
10 INJECTION INTRAMUSCULAR; INTRAVENOUS ONCE
Status: COMPLETED | OUTPATIENT
Start: 2024-12-19 | End: 2024-12-19

## 2024-12-19 RX ORDER — DIPHENHYDRAMINE HYDROCHLORIDE 50 MG/ML
50 INJECTION INTRAMUSCULAR; INTRAVENOUS ONCE
Status: COMPLETED | OUTPATIENT
Start: 2024-12-19 | End: 2024-12-19

## 2024-12-19 RX ORDER — KETOROLAC TROMETHAMINE 15 MG/ML
15 INJECTION, SOLUTION INTRAMUSCULAR; INTRAVENOUS ONCE
Status: COMPLETED | OUTPATIENT
Start: 2024-12-19 | End: 2024-12-19

## 2024-12-19 NOTE — ED PROVIDER NOTES
Patient Seen in: [facility] Emergency Department      History     Chief Complaint   Patient presents with    Seizure Disorder     Stated Complaint: seizure    Subjective:   HPI      49-year-old female presents to the emergency department after having a seizure.  Patient has known history of seizures.  She states she was very well controlled until a little over a week ago when she had a seizure.  She did follow-up with her neurologist.  She states that she had full workup and it was felt this most likely due to lack of sleep.  She states that she is had a migraine this morning which is not unusual for her she did take some Toradol but she admits that she did not really eat much and has been sleeping well.  She does get a prodrome prior to her seizures.  She states that she was aware that she was going to have a seizure and sat down and did not sustain any injuries.  She does have a history of a bleed in the past but that was related to trauma.  No history of aneurysm.  No nausea vomiting or diarrhea.  No other acute complaints    Objective:     Past Medical History:    Anxiety state    Back problem    Chronic pain    CKD (chronic kidney disease) stage 3, GFR 30-59 ml/min ([facility])    Creatinine 1.23 and GFR 53 ED visit 8/28/2020    Depression    DVT (deep venous thrombosis) ([facility])    Epilepsy ([facility])    Migraines    botox , and nerve blocks     Ovarian cyst    left 3.2cm  seen on CT in ER    Pneumonia due to COVID-19 virus    8/20    Pneumonia due to organism    covid 11/2020 , had to hospitalized 4 days     Pseudoseizure    Seizure disorder ([facility])    last seizure 2 weeks ago              Past Surgical History:   Procedure Laterality Date    Appendectomy      Appendectomy      Brain surgery      neurostim implanted./ removed     Egd N/A 09/23/2021    Procedure: ESOPHAGOGASTRODUODENOSCOPY w/ bxs, COLONOSCOPY w/ bxs;  Surgeon: Phil Murphy MD;  Location: [facility] [place]    Neurostimulator electrode      2  yrs ago    Other      frontal lobectomy    Other      facial surgery    Other      shoulder replacement    Other      cervical fusion    Other      frontal and temporal lobectomies for seizures    Other      bilat shoulder surgery    Other      Neurostimulator history.  This was removed but wires are still present.  Unable to get brain MRI    Other surgical history      cervical fusion    Other surgical history      shoulder replacement    Other surgical history      complete jaw reconstruction     Spine surgery procedure unlisted      cervical fusion     Tonsillectomy      Total shoulder arthroplasty                  Social History     Socioeconomic History    Marital status: Life Partner   Tobacco Use    Smoking status: Never     Passive exposure: Never    Smokeless tobacco: Never   Vaping Use    Vaping status: Never Used   Substance and Sexual Activity    Alcohol use: Not Currently     Comment: rare    Drug use: Yes     Types: Cannabis     Comment: nightly    Sexual activity: Yes     Partners: Female   Other Topics Concern    Caffeine Concern No    Exercise Yes    Seat Belt Yes   Social History Narrative    ** Merged History Encounter **          Social Drivers of Health     Food Insecurity: No Food Insecurity (8/19/2024)    Food Insecurity     Food Insecurity: Never true   Transportation Needs: No Transportation Needs (8/19/2024)    Transportation Needs     Lack of Transportation: No   Housing Stability: Low Risk  (8/19/2024)    Housing Stability     Housing Instability: No                  Physical Exam     ED Triage Vitals [12/19/24 1602]   BP 91/67   Pulse 88   Resp 16   Temp 98.3 °F (36.8 °C)   Temp src Oral   SpO2 100 %   O2 Device None (Room air)       Current Vitals:   Vital Signs  BP: 96/64  Pulse: 74  Resp: 21  Temp: 98.3 °F (36.8 °C)  Temp src: Oral  MAP (mmHg): 74    Oxygen Therapy  SpO2: 100 %  O2 Device: None (Room air)        Physical Exam  Vitals and nursing note reviewed.   Constitutional:        Appearance: Normal appearance. She is well-developed.   HENT:      Head: Normocephalic and atraumatic.   Neck:      Comments: No pain on palpation of her neck and c-collar was removed  Cardiovascular:      Rate and Rhythm: Normal rate and regular rhythm.      Pulses: Normal pulses.      Heart sounds: Normal heart sounds.   Pulmonary:      Effort: Pulmonary effort is normal.      Breath sounds: Normal breath sounds. No stridor. No wheezing.   Abdominal:      General: Bowel sounds are normal.      Palpations: Abdomen is soft.      Tenderness: There is no abdominal tenderness. There is no rebound.   Musculoskeletal:         General: No tenderness. Normal range of motion.      Cervical back: Normal range of motion and neck supple.   Lymphadenopathy:      Cervical: No cervical adenopathy.   Skin:     General: Skin is warm and dry.      Capillary Refill: Capillary refill takes less than 2 seconds.      Coloration: Skin is not pale.   Neurological:      General: No focal deficit present.      Mental Status: She is alert and oriented to person, place, and time.      Cranial Nerves: No cranial nerve deficit.      Coordination: Coordination normal.            ED Course     Labs Reviewed   COMP METABOLIC PANEL (14) - Abnormal; Notable for the following components:       Result Value    Chloride 113 (*)     CO2 15.0 (*)     Creatinine 1.16 (*)     eGFR-Cr 58 (*)     ALT <7 (*)     Bilirubin, Total 0.2 (*)     All other components within normal limits   CBC WITH DIFFERENTIAL WITH PLATELET   SCAN SLIDE   LAMOTRIGINE (LAMICTAL), SERUM   TOPIRAMATE (TOPAMAX), SERUM   RAINBOW DRAW LAVENDER   RAINBOW DRAW LIGHT GREEN   RAINBOW DRAW BLUE   RAINBOW DRAW GOLD     EKG    Rate, intervals and axes as noted on EKG Report.  Rate: 84  Rhythm: Sinus Rhythm  Reading: No acute ST segment changes                       MDM      Patient had an IV established and blood work obtained.  She was given a migraine cocktail that included Reglan Benadryl  and Toradol as well as fluids.  She was reevaluated and felt significantly better stating that her headache resolved.  I discussed with her repeatedly about potentially doing imaging she has had numerous CT scans in the past and at this time states that her headache is resolved that she is confident that she did not sustain any head injury and at this time does not wish to have any further imaging.  She does follow-up closely with a neurologist and a seizure specialist.  She states that she is scheduled to have levels drawn of her medications and this was done for her today she may follow-[facility] with those [facility] and her physician may find them on care everywhere as they are also part of epic.  Patient states that she will contact her neurologist tomorrow.  She was given referral as well to our seizure clinic.  She is also advised to follow-up with her primary care physician she was discharged in good condition        Medical Decision Making      Disposition and Plan     Clinical Impression:  1. Seizure disorder (HCC)    2. Other migraine without status migrainosus, not intractable         Disposition:  Discharge  12/19/2024  6:41 pm    Follow-up:  [address]  [facility] [address]  [facility] [address] [phone]  Call   An EEG and MRI have been ordered. Call office and choose option 1 for general neurology and state that you are following up for Seizure    Nara Harkins MD  [address] [place] [address]  [address]  [facility] [address] [phone]    Schedule an appointment as soon as possible for a visit            Medications Prescribed:  Discharge Medication List as of 12/19/2024  6:54 PM              Supplementary Documentation:

## 2024-12-19 NOTE — ED QUICK NOTES
Patient appears in NAD, RR even/NL, updated on POC, waiting for dispo, call light within reach, bed in low and locked position, on continuous cardiac monitoring, wctm closely.

## 2024-12-19 NOTE — ED INITIAL ASSESSMENT (HPI)
A&Ox3 patient bib ems for c/o seizure    Patient has known hx, reports compliance w/ medication (topamax and lamictal)  Endorses last seizure 12/10, prior to that was over 8 mo ago    Witnessed seizure by friend, who per ems stated patient was having full body convulsions for 5-7 min  +hit head on the ground  5mg IN Versed given by ems    Patient c/o migraine HA and neck discomfort, in c-collar from ems    RR even/NL, speaking in full clear sentences, skin w/d/I, no loss of bladder function

## 2024-12-20 ENCOUNTER — TELEPHONE (OUTPATIENT)
Dept: NEUROLOGY | Facility: CLINIC | Age: 49
End: 2024-12-20

## 2024-12-20 NOTE — TELEPHONE ENCOUNTER
SEIZURE CLINIC SCREENING    1.         Date of ED discharge:  12/19/2024    2.         Is patient currently admitted?  No              (if YES - Seizure Clinic Appointment not required.  Patient should follow usual non-urgent scheduling procedures to see neurology upon discharge from hospital.)    2.         Does the patient already have a non-[facility] neurologist (seen in past 3 years, check care everywhere and ask the patient)?  Yes  Name:  Dr Evangelista Lynch              (if YES - Seizure Clinic Appointment not required.  Patient should be advised to contact their neurologist.)    3.         Does patient already see an [facility] neurologist (seen in past 3 years)?  No  Name:                (if YES - Seizure Clinic Appointment not required.  Route message on to patient's [facility] neurologist.)    4.         Is Seizure Clinic Appointment indicated?  No - patient already established with non-[facility] neurologist    If YES - route encounter to [facility] to schedule patient for clinic appointment, new patient visit, NO LATER THAN 72 HOURS AFTER DISCHARGE.                If UNSURE - route encounter to clinic provider for recommendation.                If NO - indicate reason and close encounter: N/A

## 2024-12-23 LAB
LAMOTRIGINE LVL: 4.1 UG/ML
TOPIRAMATE LVL: 11.5 UG/ML

## 2025-01-02 ENCOUNTER — HOSPITAL ENCOUNTER (OUTPATIENT)
Age: 50
Discharge: HOME | End: 2025-01-02
Payer: MEDICARE

## 2025-01-02 VITALS
BODY MASS INDEX: 25 KG/M2 | WEIGHT: 162 LBS | SYSTOLIC BLOOD PRESSURE: 115 MMHG | RESPIRATION RATE: 20 BRPM | HEART RATE: 99 BPM | DIASTOLIC BLOOD PRESSURE: 78 MMHG | TEMPERATURE: 100 F | OXYGEN SATURATION: 96 %

## 2025-01-02 DIAGNOSIS — J06.9 VIRAL URI WITH COUGH: Primary | ICD-10-CM

## 2025-01-02 LAB
POCT INFLUENZA A: NEGATIVE
POCT INFLUENZA B: NEGATIVE
SARS-COV-2 RNA RESP QL NAA+PROBE: NOT DETECTED

## 2025-01-02 NOTE — DISCHARGE INSTRUCTIONS
You may purchase over-the-counter antihistamines and Flonase.  Over-the-counter cough suppressants like Delsym can be helpful for your symptoms.  Stay home when you are ill.  You are contagious until you are fever free for 24 hours with no fever reducing medicines and your symptoms improved.  PCP follow-up.

## 2025-01-02 NOTE — ED PROVIDER NOTES
Patient Seen in: Immediate Care [facility]      History     Chief Complaint   Patient presents with    Cough/URI     Stated Complaint: Cold Symptoms    Subjective:   This is a 49-year-old female with below stated medical history.  Presents to immediate care for cough, nasal congestion, fevers, body aches.  Symptom started yesterday.  Close exposure to influenza at work.  No difficulty breathing or wheezing.  No chest pain or shortness of breath.  Took a Tylenol and Mucinex with little relief.    The history is provided by the patient.             Objective:     Past Medical History:    Anxiety state    Back problem    Chronic pain    CKD (chronic kidney disease) stage 3, GFR 30-59 ml/min ([facility])    Creatinine 1.23 and GFR 53 ED visit 8/28/2020    Depression    DVT (deep venous thrombosis) ([facility])    Epilepsy ([facility])    Migraines    botox , and nerve blocks     Ovarian cyst    left 3.2cm  seen on CT in ER    Pneumonia due to COVID-19 virus    8/20    Pneumonia due to organism    covid 11/2020 , had to hospitalized 4 days     Pseudoseizure    Seizure disorder ([facility])    last seizure 2 weeks ago              Past Surgical History:   Procedure Laterality Date    Appendectomy      Appendectomy      Brain surgery      neurostim implanted./ removed     Egd N/A 09/23/2021    Procedure: ESOPHAGOGASTRODUODENOSCOPY w/ bxs, COLONOSCOPY w/ bxs;  Surgeon: Phil Murphy MD;  Location: [facility]    Neurostimulator electrode      2 yrs ago    Other      frontal lobectomy    Other      facial surgery    Other      shoulder replacement    Other      cervical fusion    Other      frontal and temporal lobectomies for seizures    Other      bilat shoulder surgery    Other      Neurostimulator history.  This was removed but wires are still present.  Unable to get brain MRI    Other surgical history      cervical fusion    Other surgical history      shoulder replacement    Other surgical history      complete jaw  reconstruction     Spine surgery procedure unlisted      cervical fusion     Tonsillectomy      Total shoulder arthroplasty                  Social History     Socioeconomic History    Marital status: Life Partner   Tobacco Use    Smoking status: Never     Passive exposure: Never    Smokeless tobacco: Never   Vaping Use    Vaping status: Never Used   Substance and Sexual Activity    Alcohol use: Not Currently     Comment: rare    Drug use: Yes     Types: Cannabis     Comment: nightly    Sexual activity: Yes     Partners: Female   Other Topics Concern    Caffeine Concern No    Exercise Yes    Seat Belt Yes   Social History Narrative    ** Merged History Encounter **          Social Drivers of Health     Food Insecurity: No Food Insecurity (8/19/2024)    Food Insecurity     Food Insecurity: Never true   Transportation Needs: No Transportation Needs (8/19/2024)    Transportation Needs     Lack of Transportation: No   Housing Stability: Low Risk  (8/19/2024)    Housing Stability     Housing Instability: No              Review of Systems   Constitutional:  Positive for chills and fever.   HENT:  Positive for congestion. Negative for sore throat.    Respiratory:  Positive for cough. Negative for shortness of breath, wheezing and stridor.    Cardiovascular:  Negative for chest pain, palpitations and leg swelling.   Gastrointestinal:  Negative for abdominal pain, constipation, diarrhea, nausea and vomiting.   Genitourinary:  Negative for dysuria.   Musculoskeletal:  Negative for back pain, neck pain and neck stiffness.   Skin:  Negative for rash.   Neurological:  Negative for headaches.       Positive for stated complaint: Cold Symptoms  Other systems are as noted in HPI.  Constitutional and vital signs reviewed.      All other systems reviewed and negative except as noted above.    Physical Exam     ED Triage Vitals [01/02/25 1055]   /78   Pulse 99   Resp 20   Temp 99.7 °F (37.6 °C)   Temp src Oral   SpO2 96 %   O2  Device None (Room air)       Current Vitals:   Vital Signs  BP: 115/78  Pulse: 99  Resp: 20  Temp: 99.7 °F (37.6 °C)  Temp src: Oral    Oxygen Therapy  SpO2: 96 %  O2 Device: None (Room air)        Physical Exam  Vitals and nursing note reviewed.   Constitutional:       General: She is not in acute distress.     Appearance: Normal appearance. She is normal weight. She is not ill-appearing, toxic-appearing or diaphoretic.   HENT:      Head: Normocephalic and atraumatic.      Right Ear: Tympanic membrane, ear canal and external ear normal. There is no impacted cerumen.      Left Ear: Tympanic membrane, ear canal and external ear normal. There is no impacted cerumen.      Nose: Congestion and rhinorrhea present.      Mouth/Throat:      Mouth: Mucous membranes are moist.      Pharynx: Oropharynx is clear. No oropharyngeal exudate or posterior oropharyngeal erythema.   Eyes:      General:         Right eye: No discharge.         Left eye: No discharge.      Extraocular Movements: Extraocular movements intact.      Conjunctiva/sclera: Conjunctivae normal.   Cardiovascular:      Rate and Rhythm: Normal rate and regular rhythm.      Heart sounds: Normal heart sounds. No murmur heard.  Pulmonary:      Effort: Pulmonary effort is normal. No respiratory distress.      Breath sounds: Normal breath sounds. No stridor. No wheezing, rhonchi or rales.   Musculoskeletal:      Cervical back: Neck supple.      Right lower leg: No edema.      Left lower leg: No edema.   Skin:     General: Skin is warm and dry.      Capillary Refill: Capillary refill takes less than 2 seconds.      Findings: No rash.   Neurological:      Mental Status: She is alert and oriented to person, place, and time.   Psychiatric:         Mood and Affect: Mood normal.         Behavior: Behavior normal.             ED Course     Labs Reviewed   POCT FLU TEST - Normal    Narrative:     This assay is a rapid molecular in vitro test utilizing nucleic acid  amplification of influenza A and B viral RNA.   RAPID SARS-COV-2 BY PCR - Normal            Rapid flu, rapid covid       MDM      Vital signs stable. Patient is well-appearing and nontoxic looking. Presents to immediate care for upper respiratory symptoms.    Differential diagnosis include but not limited to COVID, viral sinusitis, influenza, other viral URI, pneumonia     Lung sounds clear bilaterally. No respiratory distress or hypoxia.  No suspicion for pneumonia.  Rapid flu and rapid COVID are negative.    High suspicion this is early flu or COVID and she has not testing positive.    Clinical impression is viral URI with cough.    DC home. Recommended over-the-counter antihistamines, Flonase, and cough suppressants. Tylenol and/or ibuprofen for pain or fever.  Infection precautions reviewed.  The importance of oral hydration and close follow-up with primary provider in 1 week discussed. Reasons to seek emergent treatment reinforced. Patient verbalized understanding, and agreed with plan of care. All questions answered.          Medical Decision Making      Disposition and Plan     Clinical Impression:  1. Viral URI with cough         Disposition:  Discharge  1/2/2025 12:11 pm    Follow-up:  Nara Harkins MD  [address] [place] [address]  [address]  [facility] [address]  [phone]    In 1 week            Medications Prescribed:  Current Discharge Medication List              Supplementary Documentation:

## 2025-03-24 ENCOUNTER — HOSPITAL ENCOUNTER (EMERGENCY)
Facility: HOSPITAL | Age: 50
Discharge: HOME | End: 2025-03-24
Attending: EMERGENCY MEDICINE
Payer: MEDICARE

## 2025-03-24 ENCOUNTER — APPOINTMENT (OUTPATIENT)
Dept: CT IMAGING | Facility: HOSPITAL | Age: 50
End: 2025-03-24
Attending: EMERGENCY MEDICINE
Payer: MEDICARE

## 2025-03-24 VITALS
SYSTOLIC BLOOD PRESSURE: 104 MMHG | WEIGHT: 160 LBS | HEART RATE: 54 BPM | RESPIRATION RATE: 18 BRPM | OXYGEN SATURATION: 98 % | DIASTOLIC BLOOD PRESSURE: 69 MMHG | TEMPERATURE: 99 F | BODY MASS INDEX: 25.11 KG/M2 | HEIGHT: 67 IN

## 2025-03-24 DIAGNOSIS — G40.919 BREAKTHROUGH SEIZURE (HCC): Primary | ICD-10-CM

## 2025-03-24 LAB
ALBUMIN SERPL-MCNC: 4.1 G/DL (ref 3.2–4.8)
ALBUMIN/GLOB SERPL: 1.6 {RATIO} (ref 1–2)
ALP LIVER SERPL-CCNC: 53 U/L
ALT SERPL-CCNC: <7 U/L
ANION GAP SERPL CALC-SCNC: 12 MMOL/L (ref 0–18)
AST SERPL-CCNC: 11 U/L (ref ?–34)
BASOPHILS # BLD AUTO: 0.01 X10(3) UL (ref 0–0.2)
BASOPHILS NFR BLD AUTO: 0.2 %
BILIRUB SERPL-MCNC: 0.2 MG/DL (ref 0.3–1.2)
BUN BLD-MCNC: 17 MG/DL (ref 9–23)
CALCIUM BLD-MCNC: 8.8 MG/DL (ref 8.7–10.6)
CHLORIDE SERPL-SCNC: 110 MMOL/L (ref 98–112)
CO2 SERPL-SCNC: 18 MMOL/L (ref 21–32)
CREAT BLD-MCNC: 1.08 MG/DL
EGFRCR SERPLBLD CKD-EPI 2021: 63 ML/MIN/1.73M2 (ref 60–?)
EOSINOPHIL # BLD AUTO: 0.09 X10(3) UL (ref 0–0.7)
EOSINOPHIL NFR BLD AUTO: 1.9 %
ERYTHROCYTE [DISTWIDTH] IN BLOOD BY AUTOMATED COUNT: 14.4 %
GLOBULIN PLAS-MCNC: 2.6 G/DL (ref 2–3.5)
GLUCOSE BLD-MCNC: 88 MG/DL (ref 70–99)
HCT VFR BLD AUTO: 38.9 %
HGB BLD-MCNC: 12.5 G/DL
IMM GRANULOCYTES # BLD AUTO: 0.01 X10(3) UL (ref 0–1)
IMM GRANULOCYTES NFR BLD: 0.2 %
LYMPHOCYTES # BLD AUTO: 1.36 X10(3) UL (ref 1–4)
LYMPHOCYTES NFR BLD AUTO: 28.9 %
MCH RBC QN AUTO: 28.9 PG (ref 26–34)
MCHC RBC AUTO-ENTMCNC: 32.1 G/DL (ref 31–37)
MCV RBC AUTO: 89.8 FL
MONOCYTES # BLD AUTO: 0.41 X10(3) UL (ref 0.1–1)
MONOCYTES NFR BLD AUTO: 8.7 %
NEUTROPHILS # BLD AUTO: 2.82 X10 (3) UL (ref 1.5–7.7)
NEUTROPHILS # BLD AUTO: 2.82 X10(3) UL (ref 1.5–7.7)
NEUTROPHILS NFR BLD AUTO: 60.1 %
OSMOLALITY SERPL CALC.SUM OF ELEC: 291 MOSM/KG (ref 275–295)
PLATELET # BLD AUTO: 177 10(3)UL (ref 150–450)
POTASSIUM SERPL-SCNC: 3.9 MMOL/L (ref 3.5–5.1)
PROT SERPL-MCNC: 6.7 G/DL (ref 5.7–8.2)
RBC # BLD AUTO: 4.33 X10(6)UL
SODIUM SERPL-SCNC: 140 MMOL/L (ref 136–145)
WBC # BLD AUTO: 4.7 X10(3) UL (ref 4–11)

## 2025-03-24 PROCEDURE — 80175 DRUG SCREEN QUAN LAMOTRIGINE: CPT | Performed by: EMERGENCY MEDICINE

## 2025-03-24 PROCEDURE — 85025 COMPLETE CBC W/AUTO DIFF WBC: CPT | Performed by: EMERGENCY MEDICINE

## 2025-03-24 PROCEDURE — 80201 ASSAY OF TOPIRAMATE: CPT | Performed by: EMERGENCY MEDICINE

## 2025-03-24 PROCEDURE — 96375 TX/PRO/DX INJ NEW DRUG ADDON: CPT

## 2025-03-24 PROCEDURE — 99284 EMERGENCY DEPT VISIT MOD MDM: CPT

## 2025-03-24 PROCEDURE — 80053 COMPREHEN METABOLIC PANEL: CPT | Performed by: EMERGENCY MEDICINE

## 2025-03-24 PROCEDURE — 96361 HYDRATE IV INFUSION ADD-ON: CPT

## 2025-03-24 PROCEDURE — 99285 EMERGENCY DEPT VISIT HI MDM: CPT

## 2025-03-24 PROCEDURE — 96374 THER/PROPH/DIAG INJ IV PUSH: CPT

## 2025-03-24 PROCEDURE — 70450 CT HEAD/BRAIN W/O DYE: CPT | Performed by: EMERGENCY MEDICINE

## 2025-03-24 RX ORDER — KETOROLAC TROMETHAMINE 30 MG/ML
30 INJECTION, SOLUTION INTRAMUSCULAR; INTRAVENOUS ONCE
Status: COMPLETED | OUTPATIENT
Start: 2025-03-24 | End: 2025-03-24

## 2025-03-24 RX ORDER — GABAPENTIN 300 MG/1
CAPSULE ORAL
COMMUNITY
Start: 2024-12-28

## 2025-03-24 RX ORDER — PROCHLORPERAZINE EDISYLATE 5 MG/ML
10 INJECTION INTRAMUSCULAR; INTRAVENOUS ONCE
Status: COMPLETED | OUTPATIENT
Start: 2025-03-24 | End: 2025-03-24

## 2025-03-24 RX ORDER — PROCHLORPERAZINE EDISYLATE 5 MG/ML
10 INJECTION INTRAMUSCULAR; INTRAVENOUS ONCE
Status: DISCONTINUED | OUTPATIENT
Start: 2025-03-24 | End: 2025-03-24

## 2025-03-24 RX ORDER — SODIUM CHLORIDE 9 MG/ML
INJECTION, SOLUTION INTRAVENOUS ONCE
Status: DISCONTINUED | OUTPATIENT
Start: 2025-03-24 | End: 2025-03-24

## 2025-03-24 NOTE — ED INITIAL ASSESSMENT (HPI)
Patient to ED via EMS.  Reports she has a hx of epilepsy, patient started to have a seizure while at work, patient was given 5mg IN versed which stopped the seizure   Patient is drowsy, but A/Ox4

## 2025-03-24 NOTE — ED QUICK NOTES
Rounding Completed, patient reports feeling much better    Plan of Care reviewed. Waiting for all results.    Bed is locked and in lowest position. Call light within reach.

## 2025-03-24 NOTE — ED PROVIDER NOTES
Patient Seen in: [facility] Emergency Department      History     Chief Complaint   Patient presents with    Seizure Disorder     Stated Complaint: seizure, hx of the same, here from work.   5mg Versed IN enroute.    Subjective:   HPI      49-year-old female with history of migraine headaches, epilepsy status post vagus nerve stimulator presenting to the ER via EMS from work after she had 4 back-to-back seizures.  Patient states she normally has seizures every 2 months, her last seizure was a couple months ago, her trigger is usually severe pain.  Patient states for the past 2 weeks she has been having daily migraines that she is unable to control, also having increased back pain that became severe today.  Denies any extremity paresthesias or weakness, nausea, vomiting.  Paramedics administered intranasal Versed prior to arrival for her seizure.    Objective:     Past Medical History:    Anxiety state    Back problem    Chronic pain    CKD (chronic kidney disease) stage 3, GFR 30-59 ml/min ([facility])    Creatinine 1.23 and GFR 53 ED visit 8/28/2020    Depression    DVT (deep venous thrombosis) ([facility])    Epilepsy ([facility])    Migraines    botox , and nerve blocks     Ovarian cyst    left 3.2cm  seen on CT in ER    Pneumonia due to COVID-19 virus    8/20    Pneumonia due to organism    covid 11/2020 , had to hospitalized 4 days     Pseudoseizure    Seizure disorder ([facility])    last seizure 2 weeks ago              Past Surgical History:   Procedure Laterality Date    Appendectomy      Appendectomy      Brain surgery      neurostim implanted./ removed     Egd N/A 09/23/2021    Procedure: ESOPHAGOGASTRODUODENOSCOPY w/ bxs, COLONOSCOPY w/ bxs;  Surgeon: Phil Murphy MD;  [facility] [facility] [place]    Neurostimulator electrode      2 yrs ago    Other      frontal lobectomy    Other      facial surgery    Other      shoulder replacement    Other      cervical fusion    Other      frontal and temporal lobectomies for  seizures    Other      bilat shoulder surgery    Other      Neurostimulator history.  This was removed but wires are still present.  Unable to get brain MRI    Other surgical history      cervical fusion    Other surgical history      shoulder replacement    Other surgical history      complete jaw reconstruction     Spine surgery procedure unlisted      cervical fusion     Tonsillectomy      Total shoulder arthroplasty                  Social History     Socioeconomic History    Marital status: Life Partner   Tobacco Use    Smoking status: Never     Passive exposure: Never    Smokeless tobacco: Never   Vaping Use    Vaping status: Never Used   Substance and Sexual Activity    Alcohol use: Not Currently     Comment: rare    Drug use: Yes     Types: Cannabis     Comment: nightly    Sexual activity: Yes     Partners: Female   Other Topics Concern    Caffeine Concern No    Exercise Yes    Seat Belt Yes   Social History Narrative    ** Merged History Encounter **          Social Drivers of Health     Food Insecurity: No Food Insecurity (8/19/2024)    Food Insecurity     Food Insecurity: Never true   Transportation Needs: No Transportation Needs (8/19/2024)    Transportation Needs     Lack of Transportation: No   Housing Stability: Low Risk  (8/19/2024)    Housing Stability     Housing Instability: No                  Physical Exam     ED Triage Vitals [03/24/25 1220]   /59   Pulse 81   Resp 16   Temp 99.1 °F (37.3 °C)   Temp src Temporal   SpO2 97 %   O2 Device None (Room air)       Current Vitals:   Vital Signs  BP: 104/69  Pulse: 54  Resp: 18  Temp: 99.1 °F (37.3 °C)  Temp src: Temporal    Oxygen Therapy  SpO2: 98 %  O2 Device: None (Room air)        Physical Exam  Vitals and nursing note reviewed.   Constitutional:       General: She is not in acute distress.     Appearance: She is well-developed. She is not ill-appearing.   HENT:      Head: Normocephalic and atraumatic.   Eyes:      Extraocular Movements:  Extraocular movements intact.      Pupils: Pupils are equal, round, and reactive to light.   Cardiovascular:      Rate and Rhythm: Normal rate and regular rhythm.      Pulses: Normal pulses.      Heart sounds: Normal heart sounds.   Pulmonary:      Effort: Pulmonary effort is normal. No respiratory distress.      Breath sounds: Normal breath sounds.   Abdominal:      General: Abdomen is flat. There is no distension.      Palpations: Abdomen is soft.      Tenderness: There is no abdominal tenderness.   Musculoskeletal:      Cervical back: Neck supple.   Skin:     General: Skin is dry.   Neurological:      Mental Status: She is alert and oriented to person, place, and time.      Sensory: No sensory deficit.      Motor: No weakness.      Comments: She has mild left eyelid and lower facial droop that is chronic.   Psychiatric:         Mood and Affect: Mood normal.         Behavior: Behavior normal.             ED Course     Labs Reviewed   COMP METABOLIC PANEL (14) - Abnormal; Notable for the following components:       Result Value    CO2 18.0 (*)     Creatinine 1.08 (*)     ALT <7 (*)     Bilirubin, Total 0.2 (*)     All other components within normal limits   CBC WITH DIFFERENTIAL WITH PLATELET   LAMOTRIGINE (LAMICTAL), SERUM   TOPIRAMATE (TOPAMAX), SERUM   RAINBOW DRAW LAVENDER   RAINBOW DRAW LIGHT GREEN   RAINBOW DRAW BLUE   RAINBOW DRAW GOLD       ED Course as of 03/24/25 1518  ------------------------------------------------------------  Time: 03/24 1433  Comment: I independently interpreted CT head, no intracranial hemorrhage.  Additional details per radiology.  ------------------------------------------------------------  Time: 03/24 1504  Comment: Notified Dr. Camargo's, neurology, office of ED plan of care and discharge to follow up in office.  Lamictal and topiramate levels pending.    ------------------------------------------------------------  Time: 03/24 7096  Comment: Patient reports resolution of  pain.  ------------------------------------------------------------  Time: 03/24 1508  Comment: I independently interpreted labs, mild acidosis consistent with recent seizure noted.  Remainder of electrolytes are within normal limits.       CT BRAIN OR HEAD (CPT=70450)    Result Date: 3/24/2025  CONCLUSION:  No acute intracranial abnormality identified.  Stable postoperative changes as above. If there is clinical concern for acute ischemia/infarction, an MRI of the brain would be recommended for further evaluation.    LOCATION:  [facility]   Dictated by (CST): Rosendo Hicks MD on 3/24/2025 at 1:31 PM     Finalized by (CST): Rosendo Hicks MD on 3/24/2025 at 1:33 PM             MDM              Medical Decision Making    Breakthrough seizure  Differential diagnosis includes brain tumor, intracranial hemorrhage, migraine headache, pseudoseizure, epilepsy, medication noncompliance, sleep deprivation    49-year-old female with history of epilepsy, pseudoseizure, migraine headaches presents to the ED after self-reported 4 episodes of brief seizures requiring intranasal Versed from paramedics to abort the seizures.  Patient's exam is nonfocal, she is at her baseline.  She has been experiencing daily headaches for the past 2 weeks preceding today so CT head was completed and is unremarkable.  She was given a migraine cocktail with resolution of pain.  Levels for her Lamictal and topiramate were sent, notified her neurologist to follow-up these results and with patient next week.    Amount and/or Complexity of Data Reviewed  Labs: ordered. Decision-making details documented in ED Course.  Radiology: ordered and independent interpretation performed. Decision-making details documented in ED Course.    Risk  Prescription drug management.        Disposition and Plan     Clinical Impression:  1. Breakthrough seizure ([facility])         Disposition:  Discharge  3/24/2025  3:08 pm    Follow-up:  Dr. Camargo  Follow up with your neurologist  as discussed.  Follow up in 1 week(s)            Medications Prescribed:  Current Discharge Medication List              Supplementary Documentation:

## 2025-03-25 LAB
LAMOTRIGINE LVL: 5.6 UG/ML
TOPIRAMATE LVL: 11.7 UG/ML

## 2025-04-22 ENCOUNTER — HOSPITAL ENCOUNTER (OUTPATIENT)
Age: 50
Discharge: HOME | End: 2025-04-22
Payer: MEDICARE

## 2025-04-22 VITALS
SYSTOLIC BLOOD PRESSURE: 110 MMHG | HEART RATE: 71 BPM | OXYGEN SATURATION: 100 % | DIASTOLIC BLOOD PRESSURE: 73 MMHG | TEMPERATURE: 98 F | RESPIRATION RATE: 20 BRPM

## 2025-04-22 DIAGNOSIS — H66.001 NON-RECURRENT ACUTE SUPPURATIVE OTITIS MEDIA OF RIGHT EAR WITHOUT SPONTANEOUS RUPTURE OF TYMPANIC MEMBRANE: Primary | ICD-10-CM

## 2025-04-22 PROCEDURE — 99213 OFFICE O/P EST LOW 20 MIN: CPT | Performed by: NURSE PRACTITIONER

## 2025-04-22 PROCEDURE — 96372 THER/PROPH/DIAG INJ SC/IM: CPT | Performed by: NURSE PRACTITIONER

## 2025-04-22 RX ORDER — KETOROLAC TROMETHAMINE 30 MG/ML
30 INJECTION, SOLUTION INTRAMUSCULAR; INTRAVENOUS ONCE
Status: COMPLETED | OUTPATIENT
Start: 2025-04-22 | End: 2025-04-22

## 2025-04-22 RX ORDER — CIPROFLOXACIN AND DEXAMETHASONE 3; 1 MG/ML; MG/ML
4 SUSPENSION/ DROPS AURICULAR (OTIC) 2 TIMES DAILY
Qty: 7.5 ML | Refills: 0 | Status: SHIPPED | OUTPATIENT
Start: 2025-04-22

## 2025-04-22 NOTE — DISCHARGE INSTRUCTIONS
Take antibiotics as prescribed  Go directly to emergency department for severe ear pain, sudden inability to hearing, swelling to your face behind ears or in your neck  You may also take ibuprofen or Tylenol for pain

## 2025-04-22 NOTE — ED PROVIDER NOTES
Patient Seen in: [facility]mediate [facility]      History     Chief Complaint   Patient presents with    Ear Problem Pain     Stated Complaint: Ear infection    Subjective:   39-year-old female presents to immediate care for right ear pain.  Patient states pain for 2 weeks has been using over-the-counter eardrops with minimal relief.  Denies any hearing loss.      Objective:     Past Medical History:    Anxiety state    Back problem    Chronic pain    CKD (chronic kidney disease) stage 3, GFR 30-59 ml/min ([facility])    Creatinine 1.23 and GFR 53 ED visit 8/28/2020    Depression    DVT (deep venous thrombosis) ([facility])    Epilepsy ([facility])    Migraines    botox , and nerve blocks     Ovarian cyst    left 3.2cm  seen on CT in ER    Pneumonia due to COVID-19 virus    8/20    Pneumonia due to organism    covid 11/2020 , had to hospitalized 4 days     Pseudoseizure    Seizure disorder ([facility])    last seizure 2 weeks ago              Past Surgical History:   Procedure Laterality Date    Appendectomy      Appendectomy      Brain surgery      neurostim implanted./ removed     Egd N/A 09/23/2021    Procedure: ESOPHAGOGASTRODUODENOSCOPY w/ bxs, COLONOSCOPY w/ bxs;  Surgeon: Phil Murphy MD;  Location: [facility]    Neurostimulator electrode      2 yrs ago    Other      frontal lobectomy    Other      facial surgery    Other      shoulder replacement    Other      cervical fusion    Other      frontal and temporal lobectomies for seizures    Other      bilat shoulder surgery    Other      Neurostimulator history.  This was removed but wires are still present.  Unable to get brain MRI    Other surgical history      cervical fusion    Other surgical history      shoulder replacement    Other surgical history      complete jaw reconstruction     Spine surgery procedure unlisted      cervical fusion     Tonsillectomy      Total shoulder arthroplasty                  Social History     Socioeconomic History    Marital  status: Life Partner   Tobacco Use    Smoking status: Never     Passive exposure: Never    Smokeless tobacco: Never   Vaping Use    Vaping status: Never Used   Substance and Sexual Activity    Alcohol use: Not Currently     Comment: rare    Drug use: Yes     Types: Cannabis     Comment: nightly    Sexual activity: Yes     Partners: Female   Other Topics Concern    Caffeine Concern No    Exercise Yes    Seat Belt Yes   Social History Narrative    ** Merged History Encounter **          Social Drivers of Health     Food Insecurity: No Food Insecurity (8/19/2024)    Food Insecurity     Food Insecurity: Never true   Transportation Needs: No Transportation Needs (8/19/2024)    Transportation Needs     Lack of Transportation: No   Housing Stability: Low Risk  (8/19/2024)    Housing Stability     Housing Instability: No              Review of Systems   Constitutional: Negative.    HENT:  Positive for ear pain.    Respiratory: Negative.     Cardiovascular: Negative.    Gastrointestinal: Negative.    Skin: Negative.    Neurological: Negative.        Positive for stated complaint: Ear infection  Other systems are as noted in HPI.  Constitutional and vital signs reviewed.      All other systems reviewed and negative except as noted above.      Physical Exam     ED Triage Vitals [04/22/25 1046]   /73   Pulse 71   Resp 20   Temp 98.2 °F (36.8 °C)   Temp src Oral   SpO2 100 %   O2 Device None (Room air)       Current Vitals:   Vital Signs  BP: 110/73  Pulse: 71  Resp: 20  Temp: 98.2 °F (36.8 °C)  Temp src: Oral    Oxygen Therapy  SpO2: 100 %  O2 Device: None (Room air)        Physical Exam  Vitals and nursing note reviewed.   Constitutional:       General: She is not in acute distress.  HENT:      Head: Normocephalic.      Right Ear: Swelling and tenderness present. Tympanic membrane is erythematous and bulging.   Cardiovascular:      Rate and Rhythm: Normal rate.   Pulmonary:      Effort: Pulmonary effort is normal.    Musculoskeletal:         General: Normal range of motion.   Skin:     General: Skin is warm and dry.   Neurological:      General: No focal deficit present.      Mental Status: She is alert and oriented to person, place, and time.       ED Course   Labs Reviewed - No data to display        MDM    Medical Decision Making  Pertinent Labs & Imaging studies reviewed. (See chart for details).  Patient coming in with right ear pain.   Differential diagnosis includes otitis media, otitis externa     Patient is comfortable with plan of care.     Overall Pt looks good. Non-toxic, well-hydrated and in no respiratory distress. Vital signs are reassuring. Exam is reassuring. I do not believe pt requires and additional diagnostic studies or intervention. I believe pt can be discharged home to continue evaluation as an outpatient. Follow-up provider given. Discharge instructions given and reviewed. Return for any problems. All understand and agrees with the plan.        Problems Addressed:  Non-recurrent acute suppurative otitis media of right ear without spontaneous rupture of tympanic membrane: acute illness or injury    Risk  OTC drugs.  Prescription drug management.        Disposition and Plan     Clinical Impression:  1. Non-recurrent acute suppurative otitis media of right ear without spontaneous rupture of tympanic membrane         Disposition:  Discharge  4/22/2025 11:44 am    Follow-up:  Nara Harkins MD  [address] [place] [address]  [address]  [facility] [address]  [phone]                Medications Prescribed:  Discharge Medication List as of 4/22/2025 11:46 AM        START taking these medications    Details   amoxicillin clavulanate 875-125 MG Oral Tab Take 1 tablet by mouth 2 (two) times daily for 10 days., Normal, Disp-20 tablet, R-0      ciprofloxacin-dexamethasone 0.3-0.1 % Otic Suspension Place 4 drops into the right ear 2 (two) times daily., Normal, Disp-7.5 mL, R-0             Supplementary Documentation:

## 2025-06-03 ENCOUNTER — HOSPITAL ENCOUNTER (OUTPATIENT)
Age: 50
Discharge: HOME | End: 2025-06-03
Payer: MEDICARE

## 2025-06-03 VITALS
DIASTOLIC BLOOD PRESSURE: 75 MMHG | RESPIRATION RATE: 17 BRPM | SYSTOLIC BLOOD PRESSURE: 111 MMHG | OXYGEN SATURATION: 100 % | TEMPERATURE: 98 F | HEART RATE: 71 BPM

## 2025-06-03 DIAGNOSIS — S39.012A STRAIN OF LUMBAR REGION, INITIAL ENCOUNTER: Primary | ICD-10-CM

## 2025-06-03 PROCEDURE — 99213 OFFICE O/P EST LOW 20 MIN: CPT | Performed by: NURSE PRACTITIONER

## 2025-06-03 PROCEDURE — 96372 THER/PROPH/DIAG INJ SC/IM: CPT | Performed by: NURSE PRACTITIONER

## 2025-06-03 RX ORDER — ARIPIPRAZOLE 2 MG/1
TABLET ORAL
COMMUNITY
Start: 2025-05-09

## 2025-06-03 RX ORDER — MELATONIN
1000 DAILY
COMMUNITY
Start: 2024-09-25

## 2025-06-03 RX ORDER — BUPROPION HYDROCHLORIDE 100 MG/1
100 TABLET, EXTENDED RELEASE ORAL DAILY
COMMUNITY

## 2025-06-03 RX ORDER — LURASIDONE HYDROCHLORIDE 20 MG/1
1 TABLET, FILM COATED ORAL DAILY
COMMUNITY

## 2025-06-03 RX ORDER — LACOSAMIDE 150 MG/1
150 TABLET ORAL 2 TIMES DAILY
COMMUNITY

## 2025-06-03 RX ORDER — METHYLPREDNISOLONE 4 MG/1
TABLET ORAL
Qty: 21 TABLET | Refills: 0 | Status: SHIPPED | OUTPATIENT
Start: 2025-06-03

## 2025-06-03 RX ORDER — CYCLOBENZAPRINE HCL 10 MG
10 TABLET ORAL 3 TIMES DAILY PRN
Qty: 20 TABLET | Refills: 0 | Status: SHIPPED | OUTPATIENT
Start: 2025-06-03 | End: 2025-06-10

## 2025-06-03 RX ORDER — KETOROLAC TROMETHAMINE 30 MG/ML
30 INJECTION, SOLUTION INTRAMUSCULAR; INTRAVENOUS ONCE
Status: COMPLETED | OUTPATIENT
Start: 2025-06-03 | End: 2025-06-03

## 2025-06-03 RX ORDER — PROCHLORPERAZINE MALEATE 5 MG/1
1 TABLET ORAL EVERY 6 HOURS PRN
COMMUNITY

## 2025-06-03 RX ORDER — KETOROLAC TROMETHAMINE 30 MG/ML
INJECTION, SOLUTION INTRAMUSCULAR; INTRAVENOUS
COMMUNITY
Start: 2025-05-21

## 2025-06-03 NOTE — DISCHARGE INSTRUCTIONS
Back pain  Use the recommended pain medications regularly for the next 5 days   Alternate heat and ice, apply heat for 20 to 30 minutes, 1 to 2 hours later apply ice for 20 to 30 minutes repeat this process while awake  Do the attached back exercises morning and night to help with muscle spasm  No heavy lifting over 15 lbs, or strenuous exercise until pain is resolved.  Followup with your physician in 48-72 hours  Return to ED immediately for any numbness or weakness in your legs, fever, worsening of pain or any other concerns my back

## 2025-06-03 NOTE — ED PROVIDER NOTES
Patient Seen in: [facility]mediate [facility]      History  No chief complaint on file.    Stated Complaint: Back pain, ear problem    Subjective:   49-year-old female presents to immediate care for low back pain.  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  She states she walked it to somebody's car and has had back pain since.  She states today when she woke up she felt an acute strain send increased pain with walking.  Does have an appointment with the spine center tomorrow for chronic back issues.              Objective:     Past Medical History:    Anxiety state    Back problem    Chronic pain    CKD (chronic kidney disease) stage 3, GFR 30-59 ml/min (Formerly [facility])    Creatinine 1.23 and GFR 53 ED visit 8/28/2020    Depression    DVT (deep venous thrombosis) (Formerly [facility])    Epilepsy (Formerly [facility])    Migraines    botox , and nerve blocks     Ovarian cyst    left 3.2cm  seen on CT in ER    Pneumonia due to COVID-19 virus    8/20    Pneumonia due to organism    covid 11/2020 , had to hospitalized 4 days     Pseudoseizure    Seizure disorder (Formerly [facility])    last seizure 2 weeks ago              Past Surgical History:   Procedure Laterality Date    Appendectomy      Appendectomy      Brain surgery      neurostim implanted./ removed     Egd N/A 09/23/2021    Procedure: ESOPHAGOGASTRODUODENOSCOPY w/ bxs, COLONOSCOPY w/ bxs;  Surgeon: Phil Murphy MD;  Location: [facility]    Neurostimulator electrode      2 yrs ago    Other      frontal lobectomy    Other      facial surgery    Other      shoulder replacement    Other      cervical fusion    Other      frontal and temporal lobectomies for seizures    Other      bilat shoulder surgery    Other      Neurostimulator history.  This was removed but wires are still present.  Unable to get brain MRI    Other surgical history      cervical fusion    Other surgical history      shoulder replacement    Other surgical history       complete jaw reconstruction     Spine surgery procedure unlisted      cervical fusion     Tonsillectomy      Total shoulder arthroplasty                  Social History     Socioeconomic History    Marital status: Life Partner   Tobacco Use    Smoking status: Never     Passive exposure: Never    Smokeless tobacco: Never   Vaping Use    Vaping status: Never Used   Substance and Sexual Activity    Alcohol use: Not Currently     Comment: rare    Drug use: Yes     Types: Cannabis     Comment: nightly    Sexual activity: Yes     Partners: Female   Other Topics Concern    Caffeine Concern No    Exercise Yes    Seat Belt Yes   Social History Narrative    ** Merged History Encounter **          Social Drivers of Health     Food Insecurity: No Food Insecurity (5/12/2025)    Received from [facility]    Hunger Vital Sign     Worried About Running Out of Food in the Last Year: Never true     Ran Out of Food in the Last Year: Never true   Transportation Needs: No Transportation Needs (5/12/2025)    Received from [facility]    PRAPARE - Transportation     Lack of Transportation (Medical): No     Lack of Transportation (Non-Medical): No   Housing Stability: Low Risk  (5/12/2025)    Received from [facility]    Housing Stability Vital Sign     Unable to Pay for Housing in the Last Year: No     Number of Times Moved in the Last Year: 0     Homeless in the Last Year: No              Review of Systems    Positive for stated complaint: Back pain, ear problem  Other systems are as noted in HPI.  Constitutional and vital signs reviewed.      All other systems reviewed and negative except as noted above.                  Physical Exam    ED Triage Vitals [06/03/25 0818]   /75   Pulse 71   Resp 17   Temp 97.6 °F (36.4 °C)   Temp src Oral   SpO2 100 %   O2 Device None (Room air)       Current Vitals:   Vital Signs  BP: 111/75  Pulse: 71  Resp: 17  Temp: 97.6 °F (36.4 °C)  Temp  src: Oral    Oxygen Therapy  SpO2: 100 %  O2 Device: None (Room air)            Physical Exam  Vitals and nursing note reviewed.   Constitutional:       General: She is not in acute distress.  HENT:      Head: Normocephalic.   Cardiovascular:      Rate and Rhythm: Normal rate.   Pulmonary:      Effort: Pulmonary effort is normal.   Musculoskeletal:         General: Normal range of motion.      Lumbar back: Spasms present.   Skin:     General: Skin is warm and dry.   Neurological:      General: No focal deficit present.      Mental Status: She is alert and oriented to person, place, and time.                 ED Course  Labs Reviewed - No data to display       MDM     Medical Decision Making  Pertinent Labs & Imaging studies reviewed. (See chart for details).  Patient coming in with low back pain.   Differential diagnosis includes lumbar strain, sciatica     Patient is comfortable with plan of care.     Overall Pt looks good. Non-toxic, well-hydrated and in no respiratory distress. Vital signs are reassuring. Exam is reassuring. I do not believe pt requires and additional diagnostic studies or intervention. I believe pt can be discharged home to continue evaluation as an outpatient. Follow-up provider given. Discharge instructions given and reviewed. Return for any problems. All understand and agrees with the plan.        Problems Addressed:  Strain of lumbar region, initial encounter: acute illness or injury    Risk  OTC drugs.  Prescription drug management.        Disposition and Plan     Clinical Impression:  1. Strain of lumbar region, initial encounter         Disposition:  Discharge  6/3/2025  8:46 am    Follow-up:  Nara Harkins MD  [address] [place] [address]  [address]  [facility] [address]  [phone]                Medications Prescribed:  Discharge Medication List as of 6/3/2025  8:47 AM        START taking these medications    Details   cyclobenzaprine 10 MG Oral Tab Take 1 tablet (10 mg total) by mouth 3 (three) times  daily as needed for Muscle spasms., Normal, Disp-20 tablet, R-0      methylPREDNISolone (MEDROL) 4 MG Oral Tablet Therapy Pack Dosepack: take as directed, Normal, Disp-21 tablet, R-0                   Supplementary Documentation:

## 2025-06-03 NOTE — ED INITIAL ASSESSMENT (HPI)
Pt with c/o back pain and ear pain.  Pt states was seen by pmd last week and told fluid in ears.  Pt states was prescribed nasal spray

## 2025-07-17 ENCOUNTER — ORDER TRANSCRIPTION (OUTPATIENT)
Dept: ADMINISTRATIVE | Facility: HOSPITAL | Age: 50
End: 2025-07-17

## 2025-07-17 DIAGNOSIS — M51.16 LUMBAR DISC HERNIATION WITH RADICULOPATHY: ICD-10-CM

## 2025-07-17 DIAGNOSIS — G89.29 CHRONIC THORACIC BACK PAIN, UNSPECIFIED BACK PAIN LATERALITY: Primary | ICD-10-CM

## 2025-07-17 DIAGNOSIS — M54.6 CHRONIC THORACIC BACK PAIN, UNSPECIFIED BACK PAIN LATERALITY: Primary | ICD-10-CM

## 2025-07-31 ENCOUNTER — NURSE ONLY (OUTPATIENT)
Dept: LAB | Facility: HOSPITAL | Age: 50
End: 2025-07-31
Attending: STUDENT IN AN ORGANIZED HEALTH CARE EDUCATION/TRAINING PROGRAM

## 2025-07-31 ENCOUNTER — HOSPITAL ENCOUNTER (OUTPATIENT)
Dept: GENERAL RADIOLOGY | Facility: HOSPITAL | Age: 50
Discharge: HOME | End: 2025-07-31
Attending: STUDENT IN AN ORGANIZED HEALTH CARE EDUCATION/TRAINING PROGRAM

## 2025-07-31 ENCOUNTER — HOSPITAL ENCOUNTER (OUTPATIENT)
Dept: CT IMAGING | Facility: HOSPITAL | Age: 50
Discharge: HOME | End: 2025-07-31
Attending: STUDENT IN AN ORGANIZED HEALTH CARE EDUCATION/TRAINING PROGRAM

## 2025-07-31 VITALS
TEMPERATURE: 97 F | RESPIRATION RATE: 15 BRPM | SYSTOLIC BLOOD PRESSURE: 108 MMHG | DIASTOLIC BLOOD PRESSURE: 72 MMHG | HEART RATE: 57 BPM | OXYGEN SATURATION: 98 %

## 2025-07-31 VITALS
OXYGEN SATURATION: 98 % | SYSTOLIC BLOOD PRESSURE: 106 MMHG | TEMPERATURE: 98 F | HEIGHT: 67 IN | HEART RATE: 75 BPM | BODY MASS INDEX: 25.9 KG/M2 | RESPIRATION RATE: 16 BRPM | WEIGHT: 165 LBS | DIASTOLIC BLOOD PRESSURE: 75 MMHG

## 2025-07-31 DIAGNOSIS — M54.6 CHRONIC THORACIC BACK PAIN, UNSPECIFIED BACK PAIN LATERALITY: Primary | ICD-10-CM

## 2025-07-31 DIAGNOSIS — G89.29 CHRONIC THORACIC BACK PAIN, UNSPECIFIED BACK PAIN LATERALITY: ICD-10-CM

## 2025-07-31 DIAGNOSIS — M54.6 CHRONIC THORACIC BACK PAIN, UNSPECIFIED BACK PAIN LATERALITY: ICD-10-CM

## 2025-07-31 DIAGNOSIS — G89.29 CHRONIC THORACIC BACK PAIN, UNSPECIFIED BACK PAIN LATERALITY: Primary | ICD-10-CM

## 2025-07-31 DIAGNOSIS — M51.16 LUMBAR DISC HERNIATION WITH RADICULOPATHY: ICD-10-CM

## 2025-07-31 LAB
ERYTHROCYTE [DISTWIDTH] IN BLOOD BY AUTOMATED COUNT: 13.7 %
HCT VFR BLD AUTO: 40.7 % (ref 35–48)
HGB BLD-MCNC: 13.5 G/DL (ref 12–16)
INR BLD: 0.98 (ref 0.8–1.2)
MCH RBC QN AUTO: 30.3 PG (ref 26–34)
MCHC RBC AUTO-ENTMCNC: 33.2 G/DL (ref 31–37)
MCV RBC AUTO: 91.3 FL (ref 80–100)
PLATELET # BLD AUTO: 216 10(3)UL (ref 150–450)
PROTHROMBIN TIME: 13.1 SECONDS (ref 11.6–14.8)
RBC # BLD AUTO: 4.46 X10(6)UL (ref 3.8–5.3)
WBC # BLD AUTO: 5.8 X10(3) UL (ref 4–11)

## 2025-07-31 PROCEDURE — 62305 MYELOGRAPHY LUMBAR INJECTION: CPT | Performed by: STUDENT IN AN ORGANIZED HEALTH CARE EDUCATION/TRAINING PROGRAM

## 2025-07-31 PROCEDURE — 72132 CT LUMBAR SPINE W/DYE: CPT | Performed by: STUDENT IN AN ORGANIZED HEALTH CARE EDUCATION/TRAINING PROGRAM

## 2025-07-31 PROCEDURE — 72129 CT CHEST SPINE W/DYE: CPT | Performed by: STUDENT IN AN ORGANIZED HEALTH CARE EDUCATION/TRAINING PROGRAM

## 2025-07-31 PROCEDURE — 36415 COLL VENOUS BLD VENIPUNCTURE: CPT

## 2025-07-31 PROCEDURE — 85027 COMPLETE CBC AUTOMATED: CPT

## 2025-07-31 PROCEDURE — 85610 PROTHROMBIN TIME: CPT

## 2025-07-31 RX ORDER — IOPAMIDOL 612 MG/ML
15 INJECTION, SOLUTION INTRATHECAL
Status: COMPLETED | OUTPATIENT
Start: 2025-07-31 | End: 2025-07-31

## 2025-07-31 RX ORDER — ONDANSETRON 2 MG/ML
4 INJECTION INTRAMUSCULAR; INTRAVENOUS ONCE AS NEEDED
Status: CANCELLED | OUTPATIENT
Start: 2025-07-31 | End: 2025-07-31

## 2025-07-31 RX ORDER — ONDANSETRON 2 MG/ML
4 INJECTION INTRAMUSCULAR; INTRAVENOUS ONCE AS NEEDED
Status: ACTIVE | OUTPATIENT
Start: 2025-07-31 | End: 2025-07-31

## (undated) DEVICE — GENERAL LAPAROS CDS-LF: Brand: MEDLINE INDUSTRIES, INC.

## (undated) DEVICE — Device

## (undated) DEVICE — ADHESIVE MASTISOL 2/3ML

## (undated) DEVICE — TROCAR: Brand: KII SHIELDED BLADED ACCESS SYSTEM

## (undated) DEVICE — SLEEVE KENDALL SCD EXPRESS MED

## (undated) DEVICE — SUT VICRYL 5-0 PC-1 J834G

## (undated) DEVICE — LAWSON - TOOL TUNNELING DISP ALL VNS EQ

## (undated) DEVICE — TROCAR: Brand: KII® SLEEVE

## (undated) DEVICE — APPLICATOR CHLORAPREP 26ML

## (undated) DEVICE — STERILE POLYISOPRENE POWDER-FREE SURGICAL GLOVES WITH EMOLLIENT COATING: Brand: PROTEXIS

## (undated) DEVICE — SOL NACL IRRIG 0.9% 1000ML BTL

## (undated) DEVICE — LAWSON - WATER STL IRR PIC 1000ML

## (undated) DEVICE — DERMABOND CLOSURE 0.7ML TOPICL

## (undated) DEVICE — ENDOPATH ULTRA VERESS INSUFFLATION NEEDLES WITH LUER LOCK CONNECTORS: Brand: ENDOPATH

## (undated) DEVICE — TRAY SURESTEP 16 BARDEX UMETR

## (undated) DEVICE — LAWSON - DRAPE STERI FLUORO 35X43IN

## (undated) DEVICE — 3M™ STERI-STRIP™ REINFORCED ADHESIVE SKIN CLOSURES, R1547, 1/2 IN X 4 IN (12 MM X 100 MM), 6 STRIPS/ENVELOPE: Brand: 3M™ STERI-STRIP™

## (undated) DEVICE — STERILE POLYISOPRENE POWDER-FREE SURGICAL GLOVES: Brand: PROTEXIS

## (undated) DEVICE — LIGHT HANDLE

## (undated) DEVICE — LAWSON - SYR EAR/ULCER GRN DISP LF 3OZ

## (undated) NOTE — ED AVS SNAPSHOT
Kirstin Devries   MRN: [id number]    Department:  [facility] Emergency Department   Date of Visit:  5/9/2019           Disclosure     Insurance plans vary and the physician(s) referred by the ER may not be covered by your plan.  Please contact yo tell this physician (or your personal doctor if your instructions are to return to your personal doctor) about any new or lasting problems. The primary care or specialist physician will see patients referred from the [facility] Emergency Department.  Tee Barney

## (undated) NOTE — LETTER
Nilda Camacho [address]  [place], [address] [place] [address]    Consent for Operation  Date: __________________                                Time: _______________    1.  I authorize the performance upon Anita Gan the following operation:  Procedu procedure has been videotaped, the surgeon will obtain the original videotape. The hospital will not be responsible for storage or maintenance of this tape.   8. For the purpose of advancing medical education, I consent to the admittance of observers to the STATEMENTS REQUIRING INSERTION OR COMPLETION WERE FILLED IN.     Signature of Patient:   ___________________________    When the patient is a minor or mentally incompetent to give consent:  Signature of person authorized to consent for patient: ____________ supplements, and pills I can buy without a prescription (including street drugs/illegal medications). Failure to inform my anesthesiologist about these medicines may increase my risk of anesthetic complications. iv.  If I am allergic to anything or have ha Anesthesiologist Signature     Date   Time  I have discussed the procedure and information above with the patient (or patient’s representative) and answered their questions. The patient or their representative has agreed to have anesthesia services.     ___

## (undated) NOTE — LETTER
Date & Time: 6/3/2025, 8:38 AM  Patient: Azeb Millan  Encounter Provider(s):    Adelaida Levin APRN       To Whom It May Concern:    Azeb Millan was seen and treated in our department on 6/3/2025. She should not return to work until 06/04/2025.    If you have any questions or concerns, please do not hesitate to call.        Adelaida Levin NP-C  Nurse Practitioner

## (undated) NOTE — IP AVS SNAPSHOT
[address]  [address] [facility], Edmundo Waterman ~ [phone]                Discharge Summary   4/17/2017    Emilio Melendez           Admission Information        Provider Department    4/17/2017 Jossie Bustamante Next dose due: Take tonight        Take 10 g by mouth 2 (two) times daily. lamoTRIgine 100 MG Tabs   Last time this was given:  200 mg on 4/19/2017  8:57 AM   Commonly known as:  LAMICTAL   Next dose due:   Tomorrow AM 4/20/17 Take 10 mg by mouth nightly.                                       Patient Instructions       Follow up with your Primary md, neurology, and pain center in 1 week         Discharge Medications      CONTINUE taking these medications       Instructions P topiramate 100 MG Tabs   Last time this was given:  100 mg on 4/19/2017  8:57 AM   Commonly known as: Topamax        Take 100 mg by mouth every morning.     Refills:  0       topiramate 100 MG Tabs   Last time this was given:  100 mg on 4/19/2017  8:57 AM 8.9 -- --      Metabolic Lab Results  (Last result in the past 90 days)    ALT Bilirubin,Total Total Protein Albumin Sodium Potassium Chloride    -- -- -- -- (04/19/17)  140 (04/19/17)  4.8 (04/19/17)  113 (H)      Pending Labs     Order Current Status Your unique [place]. com. cn Access Code: [email or web address]  Expires: 6/18/2017  2:18 PM    If you have questions, you can call [phone] to talk to our [facility] Staff. Remember, [place]. com. cn is NOT to be used for urgent needs. For medical emergencies, dial 911. DiphenhydrAMINE HCl 50 MG/ML Injection Solution         Use:  Treat Allergies   Most common side effects:  Drowsiness, dry mouth   What to report to your healthcare team: Changes in thinking, confusion, palpitations           General Nerve Function Medica

## (undated) NOTE — LETTER
Date & Time: 5/23/2021, 8:49 AM  Patient: Curry Gould  Encounter Provider(s):    LINCOLN Hewitt       To Whom It May Concern:    Tanisha English was seen and treated in our department on 5/23/2021.  She should not return to work until

## (undated) NOTE — ED AVS SNAPSHOT
Elan Shaffer   MRN: [id number]    Department:  [facility] Emergency Department   Date of Visit:  1/2/2020           Disclosure     Insurance plans vary and the physician(s) referred by the ER may not be covered by your plan.  Please contact yo tell this physician (or your personal doctor if your instructions are to return to your personal doctor) about any new or lasting problems. The primary care or specialist physician will see patients referred from the [facility] Emergency Department.  Shawn Hernandez

## (undated) NOTE — LETTER
08/12/20        Smiley Dorado  [address] [place] [address]      Dear Pressley Sandhoff,    [facility] records indicate that you have outstanding lab work and or testing that was ordered for you and has not yet been completed:  Orders Placed This En

## (undated) NOTE — LETTER
08/17/24    Azeb Millan      To Whom It May Concern:    This letter has been written at the patient's request. The above patient was seen at [facility] for treatment of a medical condition from 8/15/24-8/17/24.    The patient may return to work/school on 8/19/24 with the following limitations light duty from 8/19/24-8/24/24.      Sincerely,        Shirlene CONTRERAS RN  08/17/24, 3:41 PM

## (undated) NOTE — LETTER
Nilda Camacho [address]  [place], [address] [facility]    Consent for Operation  Date: __________________                                Time: _______________    1.  I authorize the performance upon Melvenia Primrose the following operation:  Procedu procedure has been videotaped, the surgeon will obtain the original videotape. The hospital will not be responsible for storage or maintenance of this tape.   7. For the purpose of advancing medical education, I consent to the admittance of observers to the STATEMENTS REQUIRING INSERTION OR COMPLETION WERE FILLED IN.     Signature of Patient:   ___________________________    When the patient is a minor or mentally incompetent to give consent:  Signature of person authorized to consent for patient: ____________ supplements, and pills I can buy without a prescription (including street drugs/illegal medications). Failure to inform my anesthesiologist about these medicines may increase my risk of anesthetic complications. iv.  If I am allergic to anything or have ha Anesthesiologist Signature     Date   Time  I have discussed the procedure and information above with the patient (or patient’s representative) and answered their questions. The patient or their representative has agreed to have anesthesia services.     ___